# Patient Record
Sex: FEMALE | Race: WHITE | NOT HISPANIC OR LATINO | Employment: OTHER | URBAN - METROPOLITAN AREA
[De-identification: names, ages, dates, MRNs, and addresses within clinical notes are randomized per-mention and may not be internally consistent; named-entity substitution may affect disease eponyms.]

---

## 2018-07-20 ENCOUNTER — OFFICE VISIT (OUTPATIENT)
Dept: PODIATRY | Facility: CLINIC | Age: 78
End: 2018-07-20
Payer: MEDICARE

## 2018-07-20 DIAGNOSIS — M19.079 OSTEOARTHRITIS OF FIRST METATARSOPHALANGEAL JOINT: ICD-10-CM

## 2018-07-20 DIAGNOSIS — M20.42 HAMMER TOES OF BOTH FEET: ICD-10-CM

## 2018-07-20 DIAGNOSIS — G60.9 IDIOPATHIC PERIPHERAL NEUROPATHY: Primary | ICD-10-CM

## 2018-07-20 DIAGNOSIS — M20.11 VALGUS DEFORMITY OF BOTH GREAT TOES: ICD-10-CM

## 2018-07-20 DIAGNOSIS — M20.12 VALGUS DEFORMITY OF BOTH GREAT TOES: ICD-10-CM

## 2018-07-20 DIAGNOSIS — M20.41 HAMMER TOES OF BOTH FEET: ICD-10-CM

## 2018-07-20 PROCEDURE — 99999 PR PBB SHADOW E&M-NEW PATIENT-LVL II: CPT | Mod: PBBFAC,,, | Performed by: PODIATRIST

## 2018-07-20 PROCEDURE — 99214 OFFICE O/P EST MOD 30 MIN: CPT | Mod: S$PBB,,, | Performed by: PODIATRIST

## 2018-07-20 PROCEDURE — 99202 OFFICE O/P NEW SF 15 MIN: CPT | Mod: PBBFAC | Performed by: PODIATRIST

## 2018-07-20 RX ORDER — TAMSULOSIN HYDROCHLORIDE 0.4 MG/1
CAPSULE ORAL
COMMUNITY
End: 2021-12-28

## 2018-07-20 RX ORDER — DICLOFENAC SODIUM 10 MG/G
2 GEL TOPICAL 3 TIMES DAILY
Qty: 100 G | Refills: 1 | Status: SHIPPED | OUTPATIENT
Start: 2018-07-20 | End: 2021-12-28

## 2018-07-20 RX ORDER — DULOXETIN HYDROCHLORIDE 20 MG/1
20 CAPSULE, DELAYED RELEASE ORAL DAILY
Qty: 30 CAPSULE | Refills: 0 | Status: SHIPPED | OUTPATIENT
Start: 2018-07-20 | End: 2018-08-17 | Stop reason: SDUPTHER

## 2018-07-20 RX ORDER — ROSUVASTATIN CALCIUM 5 MG/1
TABLET, COATED ORAL
COMMUNITY
End: 2021-12-28 | Stop reason: SDUPTHER

## 2018-07-20 RX ORDER — TURMERIC 100 %
POWDER (GRAM) MISCELLANEOUS
COMMUNITY

## 2018-07-20 RX ORDER — METHOCARBAMOL 750 MG/1
TABLET, FILM COATED ORAL
COMMUNITY
End: 2021-12-28 | Stop reason: ALTCHOICE

## 2018-07-20 RX ORDER — ALPRAZOLAM 0.25 MG/1
TABLET ORAL
COMMUNITY
End: 2022-06-06

## 2018-07-20 RX ORDER — FERROUS SULFATE, DRIED 160(50) MG
TABLET, EXTENDED RELEASE ORAL
COMMUNITY

## 2018-07-20 RX ORDER — LEVOTHYROXINE SODIUM 75 UG/1
TABLET ORAL
COMMUNITY
End: 2021-12-28 | Stop reason: SDUPTHER

## 2018-07-20 RX ORDER — LANOLIN ALCOHOL/MO/W.PET/CERES
CREAM (GRAM) TOPICAL
COMMUNITY

## 2018-07-20 RX ORDER — GABAPENTIN 100 MG/1
100 CAPSULE ORAL 3 TIMES DAILY
Refills: 2 | COMMUNITY
Start: 2018-07-03 | End: 2019-12-04

## 2018-07-20 RX ORDER — FENOFIBRATE 145 MG/1
TABLET, FILM COATED ORAL
COMMUNITY
End: 2022-06-06

## 2018-07-25 PROBLEM — G60.9 IDIOPATHIC PERIPHERAL NEUROPATHY: Status: ACTIVE | Noted: 2018-07-25

## 2018-07-25 NOTE — PROGRESS NOTES
Subjective:      Patient ID: Gardenia Dent is a 78 y.o. female.    Chief Complaint: Follow-up and Nail Problem  Patient presents with complaint of more symptoms in her feet due to neuropath, not diabetic related.  Relates symptoms are always present, pain level fluctuates.  Sees Dr. Beckwith, but no treatment plan was really initiated.  States 1- 100 mg gabapentin we prescribed at night helps.  She has noticed symptoms in her hands as well.  Pain level 3/10 today.      ROS     Constitutional   Well-nourished, no distress, well oriented    Cardiovascular          No chest pain, no shortness of breath    Respiratory          No cough, no wheezing    Musculoskeletal        YES muscle aches, YES arthralgias/joint pain/ obvious evidence of arthritic and degenerative changes in feet and hands,                  no swelling in the extremities    Neurologic         NEUROPATHY         Objective:      Physical Exam  Vascular   Arterial Pulses Right: posterior tibialis 2/4, dorsalis pedis 2/4   Arterial Pulses Left: posterior tibialis 2/4, dorsalis pedis 2/4   No lower extremity edema bilateral   Mild varicosities consistent with age  Capillary refill test normal bilateral   Pedal skin temperature and color are normal bilateral     Integumentary   Skin is in good condition bilateral feet, thin, fragile, soft.   Narrow, bony foot type and structure with lack of fat pad  Prominent medial eminence HAV right>left foot, no pain, no erythema.   Hammertoe second digit right foot, stable  Onychomycosis of nails, no complications    Neurological   Neurological: gross sensation intact.   Experiences paresthesias consistent with neuropathy, no pain upon compression of web spaces were dorsal cutaneous nerves bilateral feet  Manual Muscle Test:  Excellent for patient's age with no pain bilateral     Musculoskeletal   Muscle Strength and Tone: normal, normal tone    Joints, Bones, and Muscles: limited ROM, hammertoe deformities (Semirigid  hammertoe second digit right, no pain), HAV with bunion/ moderate HAV right, prominent medial eminence, limited range of motion          Assessment:       Encounter Diagnoses   Name Primary?    Idiopathic peripheral neuropathy Yes    Osteoarthritis of first metatarsophalangeal joint     Valgus deformity of both great toes     Hammer toes of both feet          Plan:       Gardenia was seen today for follow-up and nail problem.    Diagnoses and all orders for this visit:    Idiopathic peripheral neuropathy    Osteoarthritis of first metatarsophalangeal joint    Valgus deformity of both great toes    Hammer toes of both feet    Other orders  -     DULoxetine (CYMBALTA) 20 MG capsule; Take 1 capsule (20 mg total) by mouth once daily.  -     diclofenac sodium 1 % Gel; Apply 2 g topically 3 (three) times daily.        After lengthy discussion with patient regarding ft type and structure, obvious evidence of arthritis in her feet and her hands and her neuropathy explained chronic underlying inflammation due to her arthritis is probably one of the main causes of her nerve pain.  Advised patient 1 -100 mg capsule of gabapentin at night is an extremely low dose.  We had a lengthy discussion regarding Cymbalta.  Advised patient would recommend she try this medication at all low dose for her overall symptoms and relates this information to Dr. Beckwith and her PCP.  We discussed medication, its effectiveness.   Patient related she would like to try Cymbalta, it is important to her to stay as active as possible.  Prescribed 1-20 mg Cymbalta daily.  Advised patient this can be increased as needed, discontinue gabapentin at night for about a week, may not be needed if Cymbalta is  effective, but if increased pain at night she can take it. Rxplained these medications are at a low dose and will not interfere with one another.  We did discuss potential side effects if she is very sensitive to these medications and obviously she is to  discontinue it and contact the office immediately with any side effects.  Patient verbalized understanding.  Recommended topical anti-inflammatory due to arthritic changes in feet especially 1st MPJ right.  Prescribed diclofenac gel and explained how to apply daily.  We reviewed appropriate shoes, especially for indoors  Counseled the patient on her conditions, their implications and medical management.  Instructed patient to contact the office with any changes, questions, concerns, worsening of symptoms. Patient verbalized understanding.   Total face to face time, exam, assessment, treatment, discussion, 25 minutes, more than half this time spent on consultation and coordination of care.   Follow up 1 months

## 2018-08-17 ENCOUNTER — OFFICE VISIT (OUTPATIENT)
Dept: PODIATRY | Facility: CLINIC | Age: 78
End: 2018-08-17
Payer: MEDICARE

## 2018-08-17 VITALS
HEIGHT: 65 IN | TEMPERATURE: 97 F | WEIGHT: 160 LBS | SYSTOLIC BLOOD PRESSURE: 117 MMHG | BODY MASS INDEX: 26.66 KG/M2 | DIASTOLIC BLOOD PRESSURE: 71 MMHG | HEART RATE: 82 BPM

## 2018-08-17 DIAGNOSIS — M20.11 VALGUS DEFORMITY OF BOTH GREAT TOES: ICD-10-CM

## 2018-08-17 DIAGNOSIS — M20.42 HAMMER TOES OF BOTH FEET: ICD-10-CM

## 2018-08-17 DIAGNOSIS — M20.41 HAMMER TOES OF BOTH FEET: ICD-10-CM

## 2018-08-17 DIAGNOSIS — G60.9 IDIOPATHIC PERIPHERAL NEUROPATHY: ICD-10-CM

## 2018-08-17 DIAGNOSIS — M19.079 OSTEOARTHRITIS OF FIRST METATARSOPHALANGEAL JOINT: ICD-10-CM

## 2018-08-17 DIAGNOSIS — M20.12 VALGUS DEFORMITY OF BOTH GREAT TOES: ICD-10-CM

## 2018-08-17 PROCEDURE — 99214 OFFICE O/P EST MOD 30 MIN: CPT | Mod: S$PBB,,, | Performed by: PODIATRIST

## 2018-08-17 PROCEDURE — 99213 OFFICE O/P EST LOW 20 MIN: CPT | Mod: PBBFAC | Performed by: PODIATRIST

## 2018-08-17 PROCEDURE — 99999 PR PBB SHADOW E&M-EST. PATIENT-LVL III: CPT | Mod: PBBFAC,,, | Performed by: PODIATRIST

## 2018-08-17 RX ORDER — DULOXETIN HYDROCHLORIDE 20 MG/1
20 CAPSULE, DELAYED RELEASE ORAL 2 TIMES DAILY
Qty: 180 CAPSULE | Refills: 0 | Status: SHIPPED | OUTPATIENT
Start: 2018-08-17 | End: 2018-11-22 | Stop reason: SDUPTHER

## 2018-08-24 NOTE — PROGRESS NOTES
Subjective:      Patient ID: Gardenia Dent is a 78 y.o. female.    Chief Complaint: Follow-up; Foot Pain; and Foot Problem  Patient presents for follow up neuropathic pain, not diabetic related.   Relates Cymbalta 20 mg daily has been helpful throughout the day, still increased pain at night with prolonged walking and standing, patient is very active.  She relates no side effects to the medication.  She continues to wear appropriate shoes to accommodate bunions/arthritis using toe spacers daily.      ROS     Constitutional   Well-nourished, no distress, well oriented    Cardiovascular          No chest pain, no shortness of breath    Respiratory         No cough, no congestion    Musculoskeletal        YES muscle aches, YES arthralgias/joint pain/hands    Neurologic         NEUROPATHY         Objective:      Physical Exam  Vascular   Arterial Pulses Right: posterior tibialis 2/4, dorsalis pedis 2/4   Arterial Pulses Left: posterior tibialis 2/4, dorsalis pedis 2/4   No lower extremity edema bilateral   Mild varicosities consistent with age  Capillary refill test normal bilateral   Pedal skin temperature and color are normal bilateral     Integumentary   Narrow, bony foot type and structure with lack of fat pad  Prominent medial eminence HAV right>left foot  Hammertoe second digit right foot, stable    Neurological   Neurological: gross sensation intact.   Experiences paresthesias consistent with neuropathy, no pain upon compression of web spaces were dorsal cutaneous nerves bilateral feet     Musculoskeletal   Muscle Strength and Tone: normal/  intact/ excellent for age, normal tone    Joints, Bones, and Muscles: limited ROM, hammertoe deformities/semirigid hammertoe second digit right  HAV with bunion/ moderate HAV right, prominent medial eminence, limited range of motion          Assessment:       Encounter Diagnoses   Name Primary?    Idiopathic peripheral neuropathy     Osteoarthritis of first  metatarsophalangeal joint     Valgus deformity of both great toes     Hammer toes of both feet          Plan:       Gardenia was seen today for follow-up, foot pain and foot problem.    Diagnoses and all orders for this visit:    Idiopathic peripheral neuropathy    Osteoarthritis of first metatarsophalangeal joint    Valgus deformity of both great toes    Hammer toes of both feet    Other orders  -     DULoxetine (CYMBALTA) 20 MG capsule; Take 1 capsule (20 mg total) by mouth 2 (two) times daily.      Discussed increasing Cymbalta to twice daily.  Reviewed medication at length, potential side effects.  Patient was in agreement with treatment plan.   Prescription increased to 120 mg tablet twice daily.  Advised patient do not take gabapentin throughout the day with this medication.  After a few weeks monitoring potential side effects and results of the Cymbalta she can take 100 mg gabapentin at night if needed for increased nerve pain in the evening.  We reviewed appropriate shoes, continue use of spacers for bunions.   advised patient continue Voltaren Gel daily as directed to feet.  Counseled the patient on her conditions, their implications and medical management.  Instructed patient to contact the office with any changes, questions, concerns, worsening of symptoms. Patient verbalized understanding.   Total face to face time, exam, assessment, treatment, discussion, documentation 25 minutes, more than half this time spent on consultation and coordination of care.   Follow up 3 months.      This note was created using [employer] voice recognition software that occasionally misinterpreted phrases or words.

## 2018-11-21 ENCOUNTER — TELEPHONE (OUTPATIENT)
Dept: PODIATRY | Facility: CLINIC | Age: 78
End: 2018-11-21

## 2018-11-21 RX ORDER — DULOXETIN HYDROCHLORIDE 20 MG/1
20 CAPSULE, DELAYED RELEASE ORAL 2 TIMES DAILY
Qty: 180 CAPSULE | Refills: 0 | Status: CANCELLED | OUTPATIENT
Start: 2018-11-21 | End: 2019-02-19

## 2018-11-21 NOTE — TELEPHONE ENCOUNTER
Pt requesting refill of Duloxetine 20mg to be sent to [facility].

## 2018-11-22 RX ORDER — DULOXETIN HYDROCHLORIDE 20 MG/1
20 CAPSULE, DELAYED RELEASE ORAL 2 TIMES DAILY
Qty: 180 CAPSULE | Refills: 0 | Status: SHIPPED | OUTPATIENT
Start: 2018-11-22 | End: 2019-02-20

## 2019-10-02 ENCOUNTER — CLINICAL SUPPORT (OUTPATIENT)
Dept: REHABILITATION | Facility: HOSPITAL | Age: 79
End: 2019-10-02
Payer: MEDICARE

## 2019-10-02 DIAGNOSIS — M54.31 RIGHT SIDED SCIATICA: Primary | ICD-10-CM

## 2019-10-02 DIAGNOSIS — M54.50 LOW BACK PAIN: ICD-10-CM

## 2019-10-02 DIAGNOSIS — M54.41 ACUTE RIGHT-SIDED LOW BACK PAIN WITH RIGHT-SIDED SCIATICA: Primary | ICD-10-CM

## 2019-10-02 PROBLEM — M54.40 ACUTE RIGHT-SIDED LOW BACK PAIN WITH SCIATICA: Status: ACTIVE | Noted: 2019-10-02

## 2019-10-02 PROCEDURE — G8978 MOBILITY CURRENT STATUS: HCPCS | Mod: CL,PN

## 2019-10-02 PROCEDURE — 97161 PT EVAL LOW COMPLEX 20 MIN: CPT | Mod: PN

## 2019-10-02 PROCEDURE — G8979 MOBILITY GOAL STATUS: HCPCS | Mod: CJ,PN

## 2019-10-02 NOTE — PLAN OF CARE
[facility]  Physical Therapy Initial Evaluation    Name: Gardenia Dent  Clinic Number: [id number]    Therapy Diagnosis:   Encounter Diagnosis   Name Primary?    Acute right-sided low back pain with right-sided sciatica Yes     Physician: Oxana Greene MD    Physician Orders: PT Eval and Treat   Medical Diagnosis: lumbar Radiculopathy   Evaluation Date: 10/2/2019  Authorization period Expiration: 12/31/2019  Plan of Care Certification Period: 12/31/2019     Visit #: 1/ Visits authorized: 12  Time In:2:00 PM   Time Out: 3:15 PM   Total Billable Time: 60  minutes    Precautions: Standard    Subjective   Date of onset: Saturday 9/28/2019   Date of Surgery: N/A     No past medical history on file.  Gardenia Dent  has a past surgical history that includes Hysterectomy; Total knee arthroplasty; Wrist surgery; Hand surgery; Hip surgery; and Sinus surgery.    Gardenia has a current medication list which includes the following prescription(s): alprazolam, ascorbic acid, bromelains, calcium-vitamin d3, diclofenac sodium, duloxetine, fenofibrate, gabapentin, murtaza (zingiber officinalis), levothyroxine, loratadine-pseudoephedrine 5-120 mg, methocarbamol, multivit with minerals/lutein, rosuvastatin, tamsulosin, and turmeric (bulk).    Review of patient's allergies indicates:   Allergen Reactions    Sulfa (sulfonamide antibiotics)         Imaging, : Xray - unremarkable     Prior Therapy: No therapy for current condition  Social History: Patient lives in a single level home with 2 steps to enter from front; 1 deeper step to enter through garage   Occupation: retired   Prior Level of Function: Active, Independent   Current Level of Function: Unable to get up and move around secondary to RLE pain; Using a walker at home to get around;       Pain: current 8/10, worst 10/10, best 4/10,   Aching, Throbbing and Grabbing, constant  Radicular symptoms: Right LE - buttock into right lateral and posterior  "thigh   Aggravating Factors: Sitting, Standing, Night Time, Lifting and Getting out of bed/chair  Easing Factors: heating pad and lying down with           History of current condition - Gardenia reports: 5 day history of symptoms, including excruciating back and RLE pain when she got up on Saturday; Gardenia reports unaware of anything that she did to precipitate her back or leg pain; Gardenia stated that the pain got so bad she had her  take her to the ER on Sunday.  She has been taking meds given and also went to see her PCP Yesterday.  Gardenia stated that she could hardly walk at all a few days ago, but today the pain has eased up a little. Gardenia stated that she has tried to do some stretching exercises at home, using a heating pad- which has helped some.     Pts goals: To relieve my pain and return to my usual state of well- being       Objective     Observation: Gardenia came into clinic via wheelchair; she stated that she was lying down in the back seat of the car and felt a little lightheaded when she got up, so was afraid to walk into clinic;     Posture: equal weight on BLE's; no lateral shift in trunk noted;     Gait: slight antalgic gait pattern noted prior to tx:  After manual stretching/mobilization of lumbar spine and some flex/extension exercises; Gardenia was able to ambulate for 2'33" around in clinic before RLE pain started to return.  This was a significant improvement from prior to tx.     Lumbar Range of Motion:    Degrees Pain   Flexion 25   No pain         Extension 5   alittle discomfort         Left Side Bending Hand to knee Pain right side along quadratus         Right Side Bending Hand to mid-thigh No pain         Left rotation   20 No pain         Right Rotation   20 No pain            Lower Extremity Strength  Right LE  Left LE    Knee extension: 5/5 Knee extension: 5/5   Knee flexion: 5/5 Knee flexion: 5/5   Hip flexion: 5/5 Hip flexion: 5/5   Hip extension:  5/5 Hip extension: 5/5   Hip " abduction: 5/5 Hip abduction: 5/5   Hip adduction: 5/5 Hip adduction 5/5   Ankle dorsiflexion: 5/5 Ankle dorsiflexion: 5/5   Ankle plantarflexion: 5/5 Ankle plantarflexion: 5/5   Upper abdominals 4-/5     Lower abdominals 3+/5     Back extensors 3+/5       Special Tests:    Repeated Flexion Negative   Repeated Extension Negative   Prone Instability Negative   Straight Leg Raise Negative   Slump Negative   Quadrant Negative   Femoral nerve test Negative       DTR:   Right Left Comment   Patellar (L3-4) 2+ 2+    Achilles (S1) 2+ 2+        Joint Mobility:   Lumbar: CPA unrestricted,   RPA unrestricted  LPA unrestricted    Thoracic: unrestricted    Palpation: moderate tenderness to palpation at right quadratus, right paraspinals     Sensation: intact to light touch BLS's; does have neuropathy in fingers secondary to carpal tunnel     Flexibility:     90/90 SLR = R minimal restriction, L minimal restriction   Ely's test: R no restriction, L no restriction   Judy's test: R no restriction, L no restriction   Joe test: R no restriction, L no restriction      Functional Limitations Reports - G Codes  Category: Mobility, Body position, Carrying, Self care, Other  Tool: Oswestry   Score: 74% limitation   Current:CL = least 60% but < 80% impaired, limited or restricted  Goal:CJ = at least 20% but < 40% impaired, limited or restricted    PT Evaluation Completed: Yes  Discussed Plan of Care with patient: Yes    TREATMENT:  Gardenia received therapeutic exercises to develop ROM for 15 minutes including:  Instructed Gardenia in exercises below:  SKC > DKC  Piriformis stretch  Bridging  Prone lying > Prone extension   Cat/Camel     Home Exercises and Patient Education Provided  Education provided re:   - progress towards goals   - role of therapy in multi - disciplinary team, goals for therapy  Pt educated on condition, POC, and expectations in therapy.  No spiritual or educational barriers to learning provided    Home exercises:  Pt  will be provided HEP during course of treatment with progressions as appropriate. Pt was advised to perform these exercises free of pain, and to stop performing them if pain occurs.   Gardenia demonstrated good  understanding of the education provided.     Assessment     Gardenia is a 79 y.o. female referred to outpatient physical therapy and presents to PT with lumbar radiculopathy . Patient demonstrates limitations as described in the problem list. Pt will benefit from physcial therapy services in order to maximize pain free and/or functional use of right LE and lower back . The following goals were discussed with the patient and patient is in agreement with them as to be addressed in the treatment plan.     Anticipated barriers to physical therapy: none    Medical necessity is demonstrated by the following IMPAIRMENTS/PROBLEM LIST:    impaired functional mobility, gait instability, decreased lower extremity function, pain and decreased ROM        GOALS:    Long Term Goals: 6 weeks    Pain: Decrease pain to no more than 2 /10 to allow for return to full participation in daily and recreational activities   Strength: Improve strength in core muscles to 4/5 for improved lumbopelvic stability  ROM: Improve ROM to 75% of normal limits   Functional scale: Improve score on Oswestry to <30% limitation   Lifting: Lift 10 lbs to waist level, 5 lbs to shoulder level, 5 lbs to overhead without pain or compensation  Walking: Increase walking distance and/or duration to 30 mins without pain   Postures: Increase sitting and/or standing duration to 30 mins without pain   Transfers: Perform sit <> stand and supine <> sit  transfers without increased pain or limitation  Exercise: demonstrate independence with home exercise program to maintain gains made in therapy.        Plan     Certification Period: 10/2/2019 to 12/31/2019.    Outpatient physical therapy 2 times weekly to include: Manual Therapy, Moist Heat/ Ice, Neuromuscular  Re-ed, Patient Education and Therapeutic Exercise. Cont PT for 6 weeks.   Pt may be seen by PTA as part of the rehabilitation team.     I certify the need for these services furnished under this plan of treatment and while under my care.    Ramona Ron, PT          Attestation:   I have seen the patient, reviewed the therapist's plan of care, and I agree with the plan of care.   I certify the need for these services furnished under this plan of treatment and while under my care.         _______________            ________                                               _____________________  Physician/Referring Practitioner                                                            Date of Signature

## 2019-10-04 ENCOUNTER — CLINICAL SUPPORT (OUTPATIENT)
Dept: REHABILITATION | Facility: HOSPITAL | Age: 79
End: 2019-10-04
Payer: MEDICARE

## 2019-10-04 DIAGNOSIS — M54.41 ACUTE RIGHT-SIDED LOW BACK PAIN WITH RIGHT-SIDED SCIATICA: Primary | ICD-10-CM

## 2019-10-04 PROCEDURE — 97112 NEUROMUSCULAR REEDUCATION: CPT | Mod: PN

## 2019-10-04 PROCEDURE — 97140 MANUAL THERAPY 1/> REGIONS: CPT | Mod: PN

## 2019-10-04 NOTE — PROGRESS NOTES
Physical Therapy Daily Note     Name: Gardenia Dent  [facility] Number: [id number]  Diagnosis:   Encounter Diagnosis   Name Primary?    Acute right-sided low back pain with right-sided sciatica Yes     Physician: Oxana Greene MD  Precautions: standard  Visit #: 2 of 12   PTA Visit #: 0  Time In: 8:00 AM   Time Out: 9:30 AM     Subjective     Pt reports: Gardenia came into clinic in the wheelchair today; stated that she has been in horrible pain since shortly after she left the other day; She is unable to sleep; got Dr. Greene to change her meds - no on Flexeril instead of gabapenton. She reports that she is unable to walk - cannot put weight on her RLE.   Pain Scale: Gardenia rates pain on a scale of 0-10 to be 10 currently.    Objective     Gardenia received individual therapeutic exercises to develop ROM, posture, core stabilization and centralization of pain from RLE to lower back  for 43  minutes including:  Time spent in 90/90 while getting heat to LB - did not reduce as much pain in her RLE as it did the other day - moved proximally into upper thigh; long axis distraction of RLE in supine followed by posterior hip stretch; pirformis stretch on right using towel for assist - hold x 6 sec and repeat x 3.   Had patient turn onto stomach - 3 pillows under abdomen; prone lying x 10 mins to try and decompress disc - moved legs away from side of pain (to left) to decreased radicular pain - patient having a very hard time tolerating any of this, just kept focusing on her pain and inability to get any relief; was able to get her into MIA without increased RLE pain and had her maintain x 5 mins. .    Had her get up and try to walk using walker; she had immediate pain upon sitting in right hip; and unable to stand on her RLE; able to ambulate a very short distance - 50ft to large mat; performed manual tx to lower back - lumbar rolls into painful side until pain started to  decrease; knee to chest using swiss ball; over into prone with pillows to try some extension again; after prone lying x 10 mins able to get into MIA x 10 reps.     Gardenia received the following manual therapy techniques: Joint mobilizations and Soft tissue Mobilization were applied to the: lumbar spine  for 15 minutes including:  SL flexion of left side of lumbar spine; side-bending on right in prone to wedge disc; - all in combination with above activities to reduce Gardenia's radicular pain      The patient received the following direct contact modalities after being cleared for contraindications:     The patient received the following supervised modalities after being cleared for contradictions: moist heat to lumbar in 90/90 position x 15 mins     Written Home Exercises Provided:   Prone lying - 2-3 pillows - gradual extension to MIA - slow, allow disc to migrate anteriorly; goal is to centralize pain into right hip/lower back.   Pt demo fair understanding of the education provided. Gardenia demonstrated fair return demonstration of activities.   nd   Education provided re:  Gardenia verbalized fair understanding of education provided.   No spiritual or educational barriers to learning provided    Assessment     Patient finally calmed down enough to tolerate some early mobilization; she wants immediate relief of her pain and explained to her that this was not usually how it worked. Needs to be vigilant with prone exercises and needs to move around as much as she is able to tolerate during the day - movement is important.   This is a 79 y.o. female referred to outpatient physical therapy and presents with a medical diagnosis of lumbar radiculopathy  and demonstrates limitations as described in the problem list. Pt prognosis is Good. Pt will continue to benefit from skilled outpatient physical therapy to address the deficits listed in the problem list, provide pt/family education and to maximize pt's level of  independence in the home and community environment.     Goals as follows:Long Term Goals: 6 weeks     Pain: Decrease pain to no more than 2 /10 to allow for return to full participation in daily and recreational activities   Strength: Improve strength in core muscles to 4/5 for improved lumbopelvic stability  ROM: Improve ROM to 75% of normal limits   Functional scale: Improve score on Oswestry to <30% limitation   Lifting: Lift 10 lbs to waist level, 5 lbs to shoulder level, 5 lbs to overhead without pain or compensation  Walking: Increase walking distance and/or duration to 30 mins without pain   Postures: Increase sitting and/or standing duration to 30 mins without pain   Transfers: Perform sit <> stand and supine <> sit  transfers without increased pain or limitation  Exercise: demonstrate independence with home exercise program to maintain gains made in therapy.                     Plan     Continue with established Plan of Care towards PT goals.    Therapist: Ramona Ron, PT  10/4/2019

## 2019-10-07 ENCOUNTER — CLINICAL SUPPORT (OUTPATIENT)
Dept: REHABILITATION | Facility: HOSPITAL | Age: 79
End: 2019-10-07
Payer: MEDICARE

## 2019-10-07 DIAGNOSIS — M54.41 ACUTE RIGHT-SIDED LOW BACK PAIN WITH RIGHT-SIDED SCIATICA: Primary | ICD-10-CM

## 2019-10-07 PROCEDURE — 97140 MANUAL THERAPY 1/> REGIONS: CPT | Mod: PN

## 2019-10-07 PROCEDURE — 97110 THERAPEUTIC EXERCISES: CPT | Mod: PN

## 2019-10-07 PROCEDURE — 97014 ELECTRIC STIMULATION THERAPY: CPT | Mod: PN

## 2019-10-07 NOTE — PROGRESS NOTES
Physical Therapy Daily Note     Name: Gardenia Dent  Clinic Number: [id number]  Diagnosis:   Encounter Diagnosis   Name Primary?    Acute right-sided low back pain with right-sided sciatica Yes     Physician: Oxana Greene MD  Precautions: standard  Visit #: 3 of 12   PTA Visit #: 0  Time In: 8:00 AM   Time Out: 9:30 AM     Subjective     Pt reports: Gardenia able to ambulate into clinic with her walker today; she stated that she had to return to the ER yesterday, the pain was just more than she could manage. She said that she is to be scheduled for an MRI; she also received a pain injection which she stated has been very helpful. She also said that the nurse told her she needs to do a lot of stretching to get the nerve un-pinched.  Sitting on chair with weight shifted to left IT  Pain Scale: Gardenia rates pain on a scale of 0-10 to be 8 currently.    Objective     Gardenia received individual therapeutic exercises to develop ROM, posture, core stabilization and centralization of pain from RLE to lower back  for 43  minutes including:    SKC > DKC:  5 reps each leg, 5 reps double  Piriformis Stretch Right leg 20 sec hold x 6  Lumbar rolls x 3 mins   Prone - knee flexion x 10  Prone leg extension x 10  MIA and move into extension          Gardenia received the following manual therapy techniques: Joint mobilizations and Soft tissue Mobilization were applied to the: lumbar spine  for 15 minutes including:  SL flexion of left side of lumbar spine; side-bending on right in prone to wedge disc; - RLE - hamstring stretching; In side lying - right piriformis STM, RLE into hip extension - lumbar extension. Prone - lumbar side bending on right side. Paraspinal skin rolling.      The patient received the following direct contact modalities after being cleared for contraindications:     The patient received the following supervised modalities after being cleared for  contradictions: moist heat to lumbar in 90/90 position x 15 mins with High Volt Electrical Stim to paraspinals on lower back.     Written Home Exercises Provided:   Prone lying - 2-3 pillows - gradual extension to MIA - slow, allow disc to migrate anteriorly; goal is to centralize pain into right hip/lower back.   Pt demo fair understanding of the education provided. Gardenia demonstrated fair return demonstration of activities.   nd   Education provided re:  Gardenia verbalized fair understanding of education provided.   No spiritual or educational barriers to learning provided    Assessment     Gardenia tolerated treatment much better today; Pain is more in her proximal thigh/hip today as opposed to further down her leg; She was able to sit with equal WB on both IT's after treatment; She is not walking with any type of lateral shift. . Needs to be vigilant with prone exercises and needs to move around as much as she is able to tolerate during the day - movement is important.   This is a 79 y.o. female referred to outpatient physical therapy and presents with a medical diagnosis of lumbar radiculopathy  and demonstrates limitations as described in the problem list. Pt prognosis is Good. Pt will continue to benefit from skilled outpatient physical therapy to address the deficits listed in the problem list, provide pt/family education and to maximize pt's level of independence in the home and community environment.     Goals as follows:Long Term Goals: 6 weeks     Pain: Decrease pain to no more than 2 /10 to allow for return to full participation in daily and recreational activities   Strength: Improve strength in core muscles to 4/5 for improved lumbopelvic stability  ROM: Improve ROM to 75% of normal limits   Functional scale: Improve score on Oswestry to <30% limitation   Lifting: Lift 10 lbs to waist level, 5 lbs to shoulder level, 5 lbs to overhead without pain or compensation  Walking: Increase walking distance  and/or duration to 30 mins without pain   Postures: Increase sitting and/or standing duration to 30 mins without pain   Transfers: Perform sit <> stand and supine <> sit  transfers without increased pain or limitation  Exercise: demonstrate independence with home exercise program to maintain gains made in therapy.                     Plan     Continue with established Plan of Care towards PT goals.    Therapist: Ramona Ron, PT  10/7/2019

## 2019-10-09 ENCOUNTER — CLINICAL SUPPORT (OUTPATIENT)
Dept: REHABILITATION | Facility: HOSPITAL | Age: 79
End: 2019-10-09
Payer: MEDICARE

## 2019-10-09 DIAGNOSIS — M54.41 ACUTE RIGHT-SIDED LOW BACK PAIN WITH RIGHT-SIDED SCIATICA: Primary | ICD-10-CM

## 2019-10-09 PROCEDURE — 97014 ELECTRIC STIMULATION THERAPY: CPT | Mod: PN

## 2019-10-09 PROCEDURE — 97140 MANUAL THERAPY 1/> REGIONS: CPT | Mod: PN

## 2019-10-09 PROCEDURE — 97110 THERAPEUTIC EXERCISES: CPT | Mod: PN

## 2019-10-09 NOTE — PROGRESS NOTES
"                                                    Physical Therapy Daily Note     Name: Gardenia Dent  Clinic Number: [id number]  Diagnosis:   No diagnosis found.  Physician: Oxana Greene MD  Precautions: standard  Visit #: 4 of 12   PTA Visit #: 0  Time In:  2:50 PM   Time Out: 4:07 PM     Subjective     Pt reports: Gardenia able to ambulate into clinic with her Rollator today; she stated that she uses it when she is walking outside - just doesn't trust her LE's at this point.  Stated that she has been able to ambulate in the house without use of an AD Pain not as intense, but she stated she was "really sore" in her lower back, right posterior buttock/hip area and into her right lateral proximal thigh   Pain Scale: Gardenia rates pain on a scale of 0-10 to be 10 currently.    Objective     Gardenia received individual therapeutic exercises to develop ROM, posture, core stabilization and centralization of pain from RLE to lower back  for 43  minutes including:    SKC > DKC:  5 reps each leg, 5 reps double  Piriformis Stretch Right leg 20 sec hold x 6  Lumbar rolls x 3 mins   Prone - knee flexion x 10  Prone leg extension x 10  MIA and move into extension  - had to move legs off to left today to perfrom prone extensions secondary to increased leg pain on Right; after 10 reps, able to come back to center and perform 5 additional without sx's.       Gardenia received the following manual therapy techniques: Joint mobilizations and Soft tissue Mobilization were applied to the: lumbar spine  for 15 minutes including:  SL flexion of left side of lumbar spine; side-bending on right in prone to wedge disc; - RLE - hamstring stretching; In side lying - right piriformis STM, RLE into hip extension - lumbar extension. Prone - lumbar side bending on right side. Paraspinal skin rolling.      The patient received the following direct contact modalities after being cleared for contraindications:     The patient received the " following supervised modalities after being cleared for contradictions: moist heat to lumbar in 90/90 position x 15 mins with High Volt Electrical Stim to paraspinals on lower back.     Written Home Exercises Provided:   Prone lying - 2-3 pillows - gradual extension to MIA - slow, allow disc to migrate anteriorly; goal is to centralize pain into right hip/lower back.   Pt demo fair understanding of the education provided. Gardenia demonstrated fair return demonstration of activities.   nd   Education provided re:  Gardenia verbalized fair understanding of education provided.   No spiritual or educational barriers to learning provided    Assessment     Gardenia tolerated treatment much better today; Gardenia is moving with greater ease, can get up from supine to sit to stand independently; Walking is much more fluid and she can stand and hold a conversation without grasping onto her walker. Pain is more in her proximal thigh/hip today as opposed to further down her leg; She was able to sit with equal WB on both IT's after treatment; She is not walking with any type of lateral shift. . Needs to be vigilant with prone exercises and needs to move around as much as she is able to tolerate during the day - movement is important.   This is a 79 y.o. female referred to outpatient physical therapy and presents with a medical diagnosis of lumbar radiculopathy  and demonstrates limitations as described in the problem list. Pt prognosis is Good. Pt will continue to benefit from skilled outpatient physical therapy to address the deficits listed in the problem list, provide pt/family education and to maximize pt's level of independence in the home and community environment.     Goals as follows:Long Term Goals: 6 weeks     Pain: Decrease pain to no more than 2 /10 to allow for return to full participation in daily and recreational activities   Strength: Improve strength in core muscles to 4/5 for improved lumbopelvic stability  ROM:  Improve ROM to 75% of normal limits   Functional scale: Improve score on Oswestry to <30% limitation   Lifting: Lift 10 lbs to waist level, 5 lbs to shoulder level, 5 lbs to overhead without pain or compensation  Walking: Increase walking distance and/or duration to 30 mins without pain   Postures: Increase sitting and/or standing duration to 30 mins without pain   Transfers: Perform sit <> stand and supine <> sit  transfers without increased pain or limitation  Exercise: demonstrate independence with home exercise program to maintain gains made in therapy.                     Plan     Continue with established Plan of Care towards PT goals.    Therapist: Ramona Ron, PT  10/9/2019

## 2019-10-11 ENCOUNTER — CLINICAL SUPPORT (OUTPATIENT)
Dept: REHABILITATION | Facility: HOSPITAL | Age: 79
End: 2019-10-11
Payer: MEDICARE

## 2019-10-11 DIAGNOSIS — M54.41 ACUTE RIGHT-SIDED LOW BACK PAIN WITH RIGHT-SIDED SCIATICA: Primary | ICD-10-CM

## 2019-10-11 PROCEDURE — 97140 MANUAL THERAPY 1/> REGIONS: CPT | Mod: PN

## 2019-10-11 PROCEDURE — 97110 THERAPEUTIC EXERCISES: CPT | Mod: PN

## 2019-10-11 NOTE — PROGRESS NOTES
PT met face to face with Jonathan Favre, PTA to discuss patient's treatment plan and progress towards established goals.  Treatment will be continued as described in initial report/eval and progress notes.  Patient will be seen by physical therapist at least every sixth visit and minimally once per month.    Additional information: ok to add following exercises as Gardenia is able to perform:     Pelvic tilts  Cat/camel   bridges   SL Hip abduction   Clams / Reverse Clams  Seated - LAQ's  Either Recumbent bike or Nu-step         Ramona Rno, PT  10/11/2019

## 2019-10-11 NOTE — PROGRESS NOTES
"                                                    Physical Therapy Daily Note     Name: Gardenia Dent  Clinic Number: [id number]  Diagnosis:   Encounter Diagnosis   Name Primary?    Acute right-sided low back pain with right-sided sciatica Yes     Physician: Oxana Greene MD  Precautions: standard  Visit #: 5 of 12   PTA Visit #: 0    Time In:  9:00 AM   Time Out: 10:10 AM      Subjective     Pt reports: "I just don't think I'm getting any better, and  Sinan is just not getting better either"  "I think there might be something else going on"   Gardenia seems to think that their bird getting sick at home a couple of weeks ago may have something to do with her onset of back pain - told her that I doubted if this was a possibility.  Gardenia ambulated into clinic with use of RW, minimal UE support required.   Pain Scale: Gardenia rates pain on a scale of 0-10 to be 10 currently.    Objective     Gardenia received individual therapeutic exercises to develop ROM, posture, core stabilization and centralization of pain from RLE to lower back  for 43  minutes including:    SKC > DKC:  5 reps each leg, 5 reps double  Piriformis Stretch Right leg 20 sec hold x 6  Lumbar rolls x 3 mins   Bridges x 10  Prone - knee flexion x 10  Prone leg extension x 10  MIA and move into extension  - had to move legs off to left today to perfrom prone extensions secondary to increased leg pain on Right; after 10 reps, able to come back to center and perform 5 additional without sx's.       Gardenia received the following manual therapy techniques: Joint mobilizations and Soft tissue Mobilization were applied to the: lumbar spine  for 15 minutes including:  SL flexion of left side of lumbar spine; side-bending on right in prone to wedge disc; - RLE - hamstring stretching; In side lying - right piriformis STM, RLE into hip extension - lumbar extension. Prone - lumbar side bending on right side. Paraspinal skin rolling.      The patient received " the following direct contact modalities after being cleared for contraindications:     The patient received the following supervised modalities after being cleared for contradictions: moist heat to lumbar in 90/90 position x 15 mins with High Volt Electrical Stim to paraspinals on lower back.     Written Home Exercises Provided:   Prone lying - 2-3 pillows - gradual extension to MIA - slow, allow disc to migrate anteriorly; goal is to centralize pain into right hip/lower back.   Pt demo fair understanding of the education provided. Gardenia demonstrated fair return demonstration of activities.   nd   Education provided re:  Gardenia verbalized fair understanding of education provided.   No spiritual or educational barriers to learning provided    Assessment     Gardenia tolerated treatment much better today; Gardenia is moving with greater ease, can get up from supine to sit to stand independently; Walking is much more fluid and she can stand and hold a conversation without grasping onto her walker. Pain is more in her proximal thigh/hip today as opposed to further down her leg; She was able to sit with equal WB on both IT's after treatment; She is not walking with any type of lateral shift. . Needs to be vigilant with prone exercises and needs to move around as much as she is able to tolerate during the day - movement is important.   This is a 79 y.o. female referred to outpatient physical therapy and presents with a medical diagnosis of lumbar radiculopathy  and demonstrates limitations as described in the problem list. Pt prognosis is Good. Pt will continue to benefit from skilled outpatient physical therapy to address the deficits listed in the problem list, provide pt/family education and to maximize pt's level of independence in the home and community environment.     Goals as follows:Long Term Goals: 6 weeks     Pain: Decrease pain to no more than 2 /10 to allow for return to full participation in daily and  recreational activities   Strength: Improve strength in core muscles to 4/5 for improved lumbopelvic stability  ROM: Improve ROM to 75% of normal limits   Functional scale: Improve score on Oswestry to <30% limitation   Lifting: Lift 10 lbs to waist level, 5 lbs to shoulder level, 5 lbs to overhead without pain or compensation  Walking: Increase walking distance and/or duration to 30 mins without pain   Postures: Increase sitting and/or standing duration to 30 mins without pain   Transfers: Perform sit <> stand and supine <> sit  transfers without increased pain or limitation  Exercise: demonstrate independence with home exercise program to maintain gains made in therapy.                     Plan     Continue with established Plan of Care towards PT goals.    Therapist: Ramona Ron, PT  10/11/2019

## 2019-10-16 ENCOUNTER — CLINICAL SUPPORT (OUTPATIENT)
Dept: REHABILITATION | Facility: HOSPITAL | Age: 79
End: 2019-10-16
Payer: MEDICARE

## 2019-10-16 DIAGNOSIS — M54.41 ACUTE RIGHT-SIDED LOW BACK PAIN WITH RIGHT-SIDED SCIATICA: Primary | ICD-10-CM

## 2019-10-16 PROCEDURE — 97010 HOT OR COLD PACKS THERAPY: CPT | Mod: PN

## 2019-10-16 PROCEDURE — 97110 THERAPEUTIC EXERCISES: CPT | Mod: PN

## 2019-10-16 PROCEDURE — 97014 ELECTRIC STIMULATION THERAPY: CPT | Mod: PN

## 2019-10-16 NOTE — PROGRESS NOTES
"                                                    Physical Therapy Daily Note     Name: Gardenia Dent  Clinic Number: [id number]  Diagnosis:   Encounter Diagnosis   Name Primary?    Acute right-sided low back pain with right-sided sciatica Yes     Physician: Oxana Greene MD  Precautions: standard  Visit #: 6 of 12   PTA Visit #: 1    Time In:  1:00 PM   Time Out:  2:05 PM      Subjective     Pt reports: "I just don't seem to be much better. I might have to switch to a chiropractor."  Pain Scale: Gardenia rates pain on a scale of 0-10 to be 6-7 currently.    Objective     Gardenia received individual therapeutic exercises to develop ROM, posture, core stabilization and centralization of pain from RLE to lower back  for 43  minutes including:    Nustep level 3 x 5 mins  SKC > DKC:  5 reps each leg, 5 reps double with strap  Piriformis Stretch Right leg 20 sec hold x 6  Lumbar rolls x 3 mins with towel between knees  Bridges x 10  Prone - knee flexion x 10  Prone leg extension x 10  Prone Lumbar Ext x 10  MIA x 3mins     DNP  Gardenia received the following manual therapy techniques: Joint mobilizations and Soft tissue Mobilization were applied to the: lumbar spine  for 15 minutes including:  SL flexion of left side of lumbar spine; side-bending on right in prone to wedge disc; - RLE - hamstring stretching; In side lying - right piriformis STM, RLE into hip extension - lumbar extension. Prone - lumbar side bending on right side. Paraspinal skin rolling.      The patient received the following direct contact modalities after being cleared for contraindications:     The patient received the following supervised modalities after being cleared for contradictions: moist heat to lumbar in 90/90 position x 15 mins with IF Electrical Stim to paraspinals on lower back.     Written Home Exercises Provided:   Prone lying - 2-3 pillows - gradual extension to MIA - slow, allow disc to migrate anteriorly; goal is to centralize " pain into right hip/lower back.   Pt demo fair understanding of the education provided. Gardenia demonstrated fair return demonstration of activities.   nd   Education provided re:  Gardenia verbalized fair understanding of education provided.   No spiritual or educational barriers to learning provided    Assessment     Gardenia tolerated treatment much better today; Gardenia is moving with greater ease, can get up from supine to sit to stand independently; Walking is much more fluid and she can stand and hold a conversation without grasping onto her walker. Pain is more in her proximal thigh/hip today as opposed to further down her leg; She was able to sit with equal WB on both IT's after treatment; She is not walking with any type of lateral shift. . Needs to be vigilant with prone exercises and needs to move around as much as she is able to tolerate during the day - movement is important.   This is a 79 y.o. female referred to outpatient physical therapy and presents with a medical diagnosis of lumbar radiculopathy  and demonstrates limitations as described in the problem list. Pt prognosis is Good. Pt will continue to benefit from skilled outpatient physical therapy to address the deficits listed in the problem list, provide pt/family education and to maximize pt's level of independence in the home and community environment.     Goals as follows:Long Term Goals: 6 weeks     Pain: Decrease pain to no more than 2 /10 to allow for return to full participation in daily and recreational activities   Strength: Improve strength in core muscles to 4/5 for improved lumbopelvic stability  ROM: Improve ROM to 75% of normal limits   Functional scale: Improve score on Oswestry to <30% limitation   Lifting: Lift 10 lbs to waist level, 5 lbs to shoulder level, 5 lbs to overhead without pain or compensation  Walking: Increase walking distance and/or duration to 30 mins without pain   Postures: Increase sitting and/or standing duration  to 30 mins without pain   Transfers: Perform sit <> stand and supine <> sit  transfers without increased pain or limitation  Exercise: demonstrate independence with home exercise program to maintain gains made in therapy.                     Plan     Continue with established Plan of Care towards PT goals.    Therapist: Jonathan Favre, PTA  10/16/2019

## 2019-11-01 ENCOUNTER — CLINICAL SUPPORT (OUTPATIENT)
Dept: REHABILITATION | Facility: HOSPITAL | Age: 79
End: 2019-11-01
Payer: MEDICARE

## 2019-11-01 DIAGNOSIS — M54.41 ACUTE RIGHT-SIDED LOW BACK PAIN WITH RIGHT-SIDED SCIATICA: Primary | ICD-10-CM

## 2019-11-01 DIAGNOSIS — M54.9 BACK PAIN: Primary | ICD-10-CM

## 2019-11-01 PROCEDURE — 97140 MANUAL THERAPY 1/> REGIONS: CPT | Mod: PN

## 2019-11-01 PROCEDURE — 97110 THERAPEUTIC EXERCISES: CPT | Mod: PN

## 2019-11-01 NOTE — PROGRESS NOTES
Physical Therapy Daily Note     Name: Gardenia ROWAN [facility] Number: [id number]  Diagnosis:   Encounter Diagnosis   Name Primary?    Acute right-sided low back pain with right-sided sciatica Yes     Physician:  Dr. Garcia   Precautions: standard  Visit #: 1 of 12   PTA Visit #: 1    Time In:  8:00 AM   Time Out:   9:30 AM        Subjective     Pt reports:  Gardenia went to see Neurosurgery - Dr. Garcia; also had another MRI with Contrast to R/O possible tumor due to constant RLE pain; Since her last visit here,  She has improved tremendously, no longer having constant right LE pain; now using cane for ambulation, but stated she uses her walker in the house at home; She has not been doing much exercise, but has been walking in the house a lot. Gardenia stated that she has been using alternate heat/ice to her back and this has given her the most relief - allowing her to sleep at night.   Pain Scale: Gardenia rates pain on a scale of 0-10 to be 3-4 currently.    Objective     Gardenia received individual therapeutic exercises to develop ROM, posture, core stabilization and centralization of pain from RLE to lower back  for 43  minutes including:    Piriformis Stretch Right leg 20 sec hold x 6  Lumbar rolls x 3 mins with towel between knees  Bridges x 10  Prone - knee flexion x 10  Prone leg extension x 10  Prone Lumbar Ext x 10  MIA x 3mins       Gardenia received the following manual therapy techniques: Joint mobilizations and Soft tissue Mobilization were applied to the: lumbar spine  for 15 minutes including:  SL flexion of left side of lumbar spine; side-bending on right in prone to wedge disc; - RLE - hamstring stretching; In side lying - right piriformis STM, RLE into hip extension - lumbar extension. Prone - lumbar side bending on right side. Paraspinal skin rolling.      The patient received the following direct contact modalities after being cleared for  contraindications:     The patient received the following supervised modalities after being cleared for contradictions: moist heat to lumbar in 90/90 position x 15 mins followed by ice to same in 90/90 position x 15 mins.      Written Home Exercises Provided:   Prone lying - 2-3 pillows - gradual extension to MIA - slow, allow disc to migrate anteriorly; goal is to centralize pain into right hip/lower back.   Pt demo fair understanding of the education provided. Gardenia demonstrated fair return demonstration of activities.   nd   Education provided re:  Gardenia verbalized fair understanding of education provided.   No spiritual or educational barriers to learning provided    Assessment     Gardenia tolerated treatment much better today; Gardenia is moving with greater ease, can get up from supine to sit to stand independently; Walking is much more fluid and she can stand and hold a conversation; using straight cane for stability. Pain is more in her proximal thigh/hip today as opposed to further down her leg; She was able to sit with equal WB on both IT's after treatment; She is not walking with any type of lateral shift. . Needs to be vigilant with prone exercises and needs to move around as much as she is able to tolerate during the day - movement is important.   This is a 79 y.o. female referred to outpatient physical therapy and presents with a medical diagnosis of lumbar radiculopathy  and demonstrates limitations as described in the problem list. Pt prognosis is Good. Pt will continue to benefit from skilled outpatient physical therapy to address the deficits listed in the problem list, provide pt/family education and to maximize pt's level of independence in the home and community environment.     Goals as follows:Long Term Goals: 6 weeks     Pain: Decrease pain to no more than 2 /10 to allow for return to full participation in daily and recreational activities   Strength: Improve strength in core muscles to 4/5  for improved lumbopelvic stability  ROM: Improve ROM to 75% of normal limits   Functional scale: Improve score on Oswestry to <30% limitation   Lifting: Lift 10 lbs to waist level, 5 lbs to shoulder level, 5 lbs to overhead without pain or compensation  Walking: Increase walking distance and/or duration to 30 mins without pain   Postures: Increase sitting and/or standing duration to 30 mins without pain   Transfers: Perform sit <> stand and supine <> sit  transfers without increased pain or limitation  Exercise: demonstrate independence with home exercise program to maintain gains made in therapy.                     Plan     Continue with established Plan of Care towards PT goals.    Therapist: Ramona Ron, PT  11/1/2019

## 2019-11-20 ENCOUNTER — TELEPHONE (OUTPATIENT)
Dept: PODIATRY | Facility: CLINIC | Age: 79
End: 2019-11-20

## 2019-11-20 NOTE — TELEPHONE ENCOUNTER
----- Message from Alysha Spaulding sent at 11/20/2019  1:17 PM CST -----  Contact: Patient's  Sinan  Type: Needs Medical Advice    Who Called: Patient  Best Call Back Number: [phone]  Additional Information: Sinan is calling to get his wife in with him on 12/4/19 around his 3:30 appt.Please call back and advise.

## 2019-11-26 ENCOUNTER — CLINICAL SUPPORT (OUTPATIENT)
Dept: REHABILITATION | Facility: HOSPITAL | Age: 79
End: 2019-11-26
Payer: MEDICARE

## 2019-11-26 DIAGNOSIS — M54.41 ACUTE RIGHT-SIDED LOW BACK PAIN WITH RIGHT-SIDED SCIATICA: Primary | ICD-10-CM

## 2019-11-26 PROCEDURE — 97140 MANUAL THERAPY 1/> REGIONS: CPT | Mod: PN

## 2019-11-26 PROCEDURE — 97110 THERAPEUTIC EXERCISES: CPT | Mod: PN

## 2019-11-26 NOTE — PROGRESS NOTES
Physical Therapy Daily Note     Name: Gardenia ROWAN [facility] Number: [id number]  Diagnosis:   Encounter Diagnosis   Name Primary?    Acute right-sided low back pain with right-sided sciatica Yes     Physician:  Dr. Garcia   Precautions: standard  Visit #: 1 of 12   PTA Visit #: 1    Time In: 11:00 AM   Time Out:   12:20 PM       Subjective      Pt reports:  Gardenia has not been in for therapy in about 3 weeks; She contacted me several weeks ago and stated that therapy was just causing her too much pain.  She wanted to stay home and perform her hot/cold treatments and walk; Gardenia called me last week stating that she was doing better, decided not to return to Dr. Garcia's office - was not happy with treatment/office; She has made an appointment with Dr. Handy Diaz - unable to get into see him until March 2020.  Gardenia wanted to start back with therapy to do some strethcing exercises - nothing too strenuous.   Pain Scale: Gardenia rates pain on a scale of 0-10 to be 5 currently. - Right upper thigh pain not consistent; does not go below knee any longer.     Objective     Gardenia received individual therapeutic exercises to develop ROM, posture, core stabilization and centralization of pain from RLE to lower back  for 43  minutes including:  SKC x 10  DKC x 6  Lumbar rolls x 3 mins with towel between knees  Bridges x 10  Pelvic tilts x 10  Leg lengthening with arm lengthening x 10  Prone - knee flexion x 10  Prone leg extension x 10  Prone Lumbar Ext x 10  MIA x 3mins     Gardenia received the following manual therapy techniques: Joint mobilizations and Soft tissue Mobilization were applied to the: lumbar spine  for 15 minutes including:   RLE/LLE - hamstring stretching; In side lying - right piriformis STM, RLE into hip extension - lumbar extension. Prone - lumbar side bending on right side. Paraspinal skin rolling.      The patient received the following direct contact  modalities after being cleared for contraindications:     The patient received the following supervised modalities after being cleared for contradictions: moist heat to lumbar in 90/90 position x 15 mins followed by ice to same in 90/90 position x 15 mins.      Written Home Exercises Provided:   Gave Gardenia a written HEP with all of the exercises noted above   Pt demo fair understanding of the education provided. Gardenia demonstrated fair return demonstration of activities.     Education provided re:  Gardenia verbalized fair understanding of education provided.   No spiritual or educational barriers to learning provided    Assessment     Gardenia tolerated treatment much better today; Gardenia is moving with greater ease, can get up from supine to sit to stand independently; Walking is much more fluid and she can stand and hold a conversation; using straight cane for stability. Pain is more in her proximal thigh/hip today as opposed to further down her leg; She was able to sit with equal WB on both IT's after treatment; She is not walking with any type of lateral shift. . Needs to be vigilant with prone exercises and needs to move around as much as she is able to tolerate during the day - movement is important.   This is a 79 y.o. female referred to outpatient physical therapy and presents with a medical diagnosis of lumbar radiculopathy  and demonstrates limitations as described in the problem list. Pt prognosis is Good. Pt will continue to benefit from skilled outpatient physical therapy to address the deficits listed in the problem list, provide pt/family education and to maximize pt's level of independence in the home and community environment.     Goals as follows:Long Term Goals: 6 weeks - all goals remain appropriate.      Pain: Decrease pain to no more than 2 /10 to allow for return to full participation in daily and recreational activities   Strength: Improve strength in core muscles to 4/5 for improved  lumbopelvic stability  ROM: Improve ROM to 75% of normal limits   Functional scale: Improve score on Oswestry to <30% limitation   Lifting: Lift 10 lbs to waist level, 5 lbs to shoulder level, 5 lbs to overhead without pain or compensation  Walking: Increase walking distance and/or duration to 30 mins without pain   Postures: Increase sitting and/or standing duration to 30 mins without pain   Transfers: Perform sit <> stand and supine <> sit  transfers without increased pain or limitation  Exercise: demonstrate independence with home exercise program to maintain gains made in therapy.                     Plan     Continue with established Plan of Care towards PT goals.    Therapist: Ramona Ron, PT  11/26/2019

## 2019-12-03 ENCOUNTER — CLINICAL SUPPORT (OUTPATIENT)
Dept: REHABILITATION | Facility: HOSPITAL | Age: 79
End: 2019-12-03
Payer: MEDICARE

## 2019-12-03 DIAGNOSIS — M54.41 ACUTE RIGHT-SIDED LOW BACK PAIN WITH RIGHT-SIDED SCIATICA: Primary | ICD-10-CM

## 2019-12-03 PROCEDURE — 97110 THERAPEUTIC EXERCISES: CPT | Mod: PN

## 2019-12-03 NOTE — PROGRESS NOTES
"                                                    Physical Therapy Daily Note     Name: Gi ROWAN [facility] Number: [id number]  Diagnosis:   Encounter Diagnosis   Name Primary?    Acute right-sided low back pain with right-sided sciatica Yes     Physician:  Dr. Garcia   Precautions: standard  Visit #: 2 of 12   PTA Visit #: 1    Time In: 3:50 PM    Time Out:   5:00 PM      Subjective      Pt reports:   "I'm still having right thigh pain, but not as often, and my back is hurting a little bit today - this is new"  Explained to gi again about improvement in disc/stenosis pain - she has had nothing but leg pain since onset - as the pressure is relieved from peripheral nerve, she will start to note more back pain - this is a sign of improvement.  Reports that she hasn't really had a lot of time for exercises, but is doing her heat/cold treatments at least 2x per day and this is really helping.   Pain Scale: Gi rates pain on a scale of 0-10 to be 3 currently. - Right upper thigh pain not consistent; does not go below knee any longer.     Objective     Gi received individual therapeutic exercises to develop ROM, posture, core stabilization and centralization of pain from RLE to lower back  for 43  minutes including:  SKC x 10  DKC x 6  Lumbar rolls x 3 mins with towel between knees  Bridges x 10  Pelvic tilts x 10  Leg lengthening with arm lengthening x 10  Prone - knee flexion x 10  Prone leg extension x 10  Prone Lumbar Ext x 10  MIA x 3mins       Did Not Perform   Gi received the following manual therapy techniques: Joint mobilizations and Soft tissue Mobilization were applied to the: lumbar spine  for 15 minutes including:   RLE/LLE - hamstring stretching; In side lying - right piriformis STM, RLE into hip extension - lumbar extension. Prone - lumbar side bending on right side. Paraspinal skin rolling.      The patient received the following direct contact modalities after being cleared for " contraindications:     The patient received the following supervised modalities after being cleared for contradictions:     Written Home Exercises Provided:   Gave Gardenia a written HEP with all of the exercises noted above   Pt demo fair understanding of the education provided. Gardenia demonstrated fair return demonstration of activities.     Education provided re:  Gardenia verbalized fair understanding of education provided.   No spiritual or educational barriers to learning provided    Assessment     Gardenia tolerated treatment much better today; Gardenia is moving with greater ease, can get up from supine to sit to stand independently;Not using a AD for ambulation today;  Pain is more in her proximal thigh/hip today as opposed to further down her leg; ; She is not walking with any type of lateral shift. . Needs to be vigilant with prone exercises and needs to move around as much as she is able to tolerate during the day - movement is important. Required assistance for all of her exercises today - is not doing them at home.   This is a 79 y.o. female referred to outpatient physical therapy and presents with a medical diagnosis of lumbar radiculopathy  and demonstrates limitations as described in the problem list. Pt prognosis is Good. Pt will continue to benefit from skilled outpatient physical therapy to address the deficits listed in the problem list, provide pt/family education and to maximize pt's level of independence in the home and community environment.     Goals as follows:Long Term Goals: 6 weeks - all goals remain appropriate.      Pain: Decrease pain to no more than 2 /10 to allow for return to full participation in daily and recreational activities   Strength: Improve strength in core muscles to 4/5 for improved lumbopelvic stability  ROM: Improve ROM to 75% of normal limits   Functional scale: Improve score on Oswestry to <30% limitation   Lifting: Lift 10 lbs to waist level, 5 lbs to shoulder level, 5  lbs to overhead without pain or compensation  Walking: Increase walking distance and/or duration to 30 mins without pain   Postures: Increase sitting and/or standing duration to 30 mins without pain   Transfers: Perform sit <> stand and supine <> sit  transfers without increased pain or limitation  Exercise: demonstrate independence with home exercise program to maintain gains made in therapy.            Plan     Continue with established Plan of Care towards PT goals.    Therapist: Ramona Ron, PT  12/3/2019

## 2019-12-04 ENCOUNTER — OFFICE VISIT (OUTPATIENT)
Dept: PODIATRY | Facility: CLINIC | Age: 79
End: 2019-12-04
Payer: MEDICARE

## 2019-12-04 VITALS
HEART RATE: 77 BPM | TEMPERATURE: 98 F | HEIGHT: 65 IN | WEIGHT: 160 LBS | DIASTOLIC BLOOD PRESSURE: 66 MMHG | SYSTOLIC BLOOD PRESSURE: 127 MMHG | BODY MASS INDEX: 26.66 KG/M2

## 2019-12-04 DIAGNOSIS — M20.41 HAMMER TOES OF BOTH FEET: ICD-10-CM

## 2019-12-04 DIAGNOSIS — M20.42 HAMMER TOES OF BOTH FEET: ICD-10-CM

## 2019-12-04 DIAGNOSIS — M19.079 OSTEOARTHRITIS OF ANKLE AND FOOT, UNSPECIFIED LATERALITY: ICD-10-CM

## 2019-12-04 DIAGNOSIS — M20.12 VALGUS DEFORMITY OF BOTH GREAT TOES: ICD-10-CM

## 2019-12-04 DIAGNOSIS — M20.11 VALGUS DEFORMITY OF BOTH GREAT TOES: ICD-10-CM

## 2019-12-04 DIAGNOSIS — G60.9 IDIOPATHIC PERIPHERAL NEUROPATHY: Primary | ICD-10-CM

## 2019-12-04 PROCEDURE — 99213 OFFICE O/P EST LOW 20 MIN: CPT | Mod: PBBFAC | Performed by: PODIATRIST

## 2019-12-04 PROCEDURE — 99213 PR OFFICE/OUTPT VISIT, EST, LEVL III, 20-29 MIN: ICD-10-PCS | Mod: S$PBB,,, | Performed by: PODIATRIST

## 2019-12-04 PROCEDURE — 1159F PR MEDICATION LIST DOCUMENTED IN MEDICAL RECORD: ICD-10-PCS | Mod: ,,, | Performed by: PODIATRIST

## 2019-12-04 PROCEDURE — 1125F AMNT PAIN NOTED PAIN PRSNT: CPT | Mod: ,,, | Performed by: PODIATRIST

## 2019-12-04 PROCEDURE — 1159F MED LIST DOCD IN RCRD: CPT | Mod: ,,, | Performed by: PODIATRIST

## 2019-12-04 PROCEDURE — 1125F PR PAIN SEVERITY QUANTIFIED, PAIN PRESENT: ICD-10-PCS | Mod: ,,, | Performed by: PODIATRIST

## 2019-12-04 PROCEDURE — 99999 PR PBB SHADOW E&M-EST. PATIENT-LVL III: ICD-10-PCS | Mod: PBBFAC,,, | Performed by: PODIATRIST

## 2019-12-04 PROCEDURE — 99999 PR PBB SHADOW E&M-EST. PATIENT-LVL III: CPT | Mod: PBBFAC,,, | Performed by: PODIATRIST

## 2019-12-04 PROCEDURE — 99213 OFFICE O/P EST LOW 20 MIN: CPT | Mod: S$PBB,,, | Performed by: PODIATRIST

## 2019-12-04 RX ORDER — CYCLOBENZAPRINE HCL 5 MG
TABLET ORAL
Refills: 0 | COMMUNITY
Start: 2019-10-03 | End: 2022-06-06

## 2019-12-04 RX ORDER — GABAPENTIN 300 MG/1
CAPSULE ORAL
COMMUNITY
Start: 2019-11-08 | End: 2021-12-28

## 2019-12-04 RX ORDER — METHYLPREDNISOLONE 4 MG/1
TABLET ORAL
Refills: 0 | COMMUNITY
Start: 2019-10-10 | End: 2021-12-28

## 2019-12-04 RX ORDER — IBUPROFEN 800 MG/1
TABLET ORAL
Refills: 0 | COMMUNITY
Start: 2019-11-09 | End: 2022-12-03

## 2019-12-04 RX ORDER — PANTOPRAZOLE SODIUM 40 MG/1
TABLET, DELAYED RELEASE ORAL
COMMUNITY
Start: 2019-11-10

## 2019-12-04 RX ORDER — HYDROCODONE BITARTRATE AND ACETAMINOPHEN 5; 325 MG/1; MG/1
TABLET ORAL
Refills: 0 | COMMUNITY
Start: 2019-10-06 | End: 2021-12-28

## 2019-12-04 RX ORDER — TRAMADOL HYDROCHLORIDE 50 MG/1
50 TABLET ORAL EVERY 4 HOURS PRN
Refills: 0 | COMMUNITY
Start: 2019-10-01 | End: 2021-12-28

## 2019-12-04 NOTE — LETTER
December 8, 2019      Oxana Greene MD  [address]  [place]  [facility] [place] [address]           [facility]  [address] [facility] [place] [address]  Phone: [phone]  Fax: [phone]          Patient: Gardenia Dent   MR Number: [id number]   YOB: 1940   Date of Visit: 12/4/2019       Dear Dr. Oxana Greene:    Thank you for referring Gardenia Dent to me for evaluation. Attached you will find relevant portions of my assessment and plan of care.    If you have questions, please do not hesitate to call me. I look forward to following Gardenia Dent along with you.    Sincerely,    Jose Manuel Brice, LIA    Enclosure  CC:  No Recipients    If you would like to receive this communication electronically, please contact [email or web address] or [phone] to request more information on [employer] Link access.    For providers and/or their staff who would like to refer a patient to [facility], please contact us through our one-stop-shop provider referral line, [facility], at [phone].    If you feel you have received this communication in error or would no longer like to receive these types of communications, please e-mail [email or web address]

## 2019-12-05 ENCOUNTER — CLINICAL SUPPORT (OUTPATIENT)
Dept: REHABILITATION | Facility: HOSPITAL | Age: 79
End: 2019-12-05
Payer: MEDICARE

## 2019-12-05 DIAGNOSIS — M54.41 ACUTE RIGHT-SIDED LOW BACK PAIN WITH RIGHT-SIDED SCIATICA: Primary | ICD-10-CM

## 2019-12-05 PROCEDURE — 97010 HOT OR COLD PACKS THERAPY: CPT | Mod: PN

## 2019-12-05 PROCEDURE — 97110 THERAPEUTIC EXERCISES: CPT | Mod: PN

## 2019-12-06 NOTE — PROGRESS NOTES
"                                                    Physical Therapy Daily Note     [facility] [id number]  Diagnosis:   Encounter Diagnosis   Name Primary?    Acute right-sided low back pain with right-sided sciatica Yes     Physician:  Dr. Garcia   Precautions: standard  Visit #: 3 of 12   PTA Visit #: 1    Time In:  1:00 PM   Time Out:   2:30 PM     Subjective      Pt reports:   "I'm doing ok, my Right leg feels tight today and heavy"    Pain Scale: Gardenia rates pain on a scale of 0-10 to be 3 currently. - Right upper thigh pain not consistent; does not go below knee any longer.     Objective     Gardenia received individual therapeutic exercises to develop ROM, posture, core stabilization and centralization of pain from RLE to lower back  for 43  minutes including:  SKC x 10  DKC x 6  Lumbar rolls x 3 mins with towel between knees  Bridges x 10  Pelvic tilts x 10  Leg lengthening with arm lengthening x 10  S/L hip abduction x 10  Clams / Reverse clams x 10   Prone - knee flexion x 10  Prone leg extension x 10 - changed to alternate arm and leg   Prone Lumbar Ext x 10  MIA x 3mins       Did Not Perform   Gardenia received the following manual therapy techniques: Joint mobilizations and Soft tissue Mobilization were applied to the: lumbar spine  for 15 minutes including:   RLE/LLE - hamstring stretching; In side lying - right piriformis STM, RLE into hip extension - lumbar extension. Prone - lumbar side bending on right side. Paraspinal skin rolling.      The patient received the following direct contact modalities after being cleared for contraindications:     The patient received the following supervised modalities after being cleared for contradictions:  Moist heat to lumbar spine x 15 mins in 90/90 position followed by ice x 15 mins in same position     Written Home Exercises Provided:   Gave Gardenia a written HEP with all of the exercises noted above   Pt demo fair understanding of the " education provided. Gardenia demonstrated fair return demonstration of activities.     Education provided re:  Gardenia verbalized fair understanding of education provided.   No spiritual or educational barriers to learning provided    Assessment     Gardenia tolerated treatment well; Noting some heaviness in her RLE today; Able to add a few more exercises today without increasing pain;   Pain is more in her proximal thigh/hip today as opposed to further down her leg; ; She is not walking with any type of lateral shift. . Needs to be vigilant with prone exercises and needs to move around as much as she is able to tolerate during the day - movement is important. Required assistance for all of her exercises today - is not doing them at home.   This is a 79 y.o. female referred to outpatient physical therapy and presents with a medical diagnosis of lumbar radiculopathy  and demonstrates limitations as described in the problem list. Pt prognosis is Good. Pt will continue to benefit from skilled outpatient physical therapy to address the deficits listed in the problem list, provide pt/family education and to maximize pt's level of independence in the home and community environment.     Goals as follows:Long Term Goals: 6 weeks - all goals remain appropriate.      Pain: Decrease pain to no more than 2 /10 to allow for return to full participation in daily and recreational activities   Strength: Improve strength in core muscles to 4/5 for improved lumbopelvic stability  ROM: Improve ROM to 75% of normal limits   Functional scale: Improve score on Oswestry to <30% limitation   Lifting: Lift 10 lbs to waist level, 5 lbs to shoulder level, 5 lbs to overhead without pain or compensation  Walking: Increase walking distance and/or duration to 30 mins without pain   Postures: Increase sitting and/or standing duration to 30 mins without pain   Transfers: Perform sit <> stand and supine <> sit  transfers without increased pain or  limitation  Exercise: demonstrate independence with home exercise program to maintain gains made in therapy.            Plan     Continue with established Plan of Care towards PT goals.    Therapist: Ramona Ron, PT  12/6/2019

## 2019-12-08 PROBLEM — M20.41 HAMMER TOES OF BOTH FEET: Status: ACTIVE | Noted: 2019-12-08

## 2019-12-08 PROBLEM — M20.12 VALGUS DEFORMITY OF BOTH GREAT TOES: Status: ACTIVE | Noted: 2019-12-08

## 2019-12-08 PROBLEM — M20.42 HAMMER TOES OF BOTH FEET: Status: ACTIVE | Noted: 2019-12-08

## 2019-12-08 PROBLEM — M19.079 OSTEOARTHRITIS OF ANKLE AND FOOT: Status: ACTIVE | Noted: 2019-12-08

## 2019-12-08 PROBLEM — M20.11 VALGUS DEFORMITY OF BOTH GREAT TOES: Status: ACTIVE | Noted: 2019-12-08

## 2019-12-08 NOTE — PROGRESS NOTES
Subjective:      Patient ID: Gardenia Dent is a 79 y.o. female.    Chief Complaint: Follow-up and Nail Problem  Patient presents for follow up neuropathic pain, not diabetic related.   Relates Cymbalta 20 mg daily has been helpful throughout the day, still increased pain at night with prolonged walking and standing, patient is very active.  She relates no side effects to the medication.  She continues to wear appropriate shoes to accommodate bunions/arthritis using toe spacers daily.      Review of Systems   Musculoskeletal: Positive for arthritis, joint pain and joint swelling.   All other systems reviewed and are negative.       Constitutional   Well-nourished, no distress, well oriented    Cardiovascular          No chest pain, no shortness of breath    Respiratory         No cough, no congestion    Musculoskeletal        YES muscle aches, YES arthralgias/joint pain/hands    Neurologic         NEUROPATHY         Objective:      Physical Exam   Constitutional: She appears well-developed and well-nourished.   Cardiovascular:   Pulses:       Dorsalis pedis pulses are 1+ on the right side, and 1+ on the left side.        Posterior tibial pulses are 1+ on the right side, and 1+ on the left side.   Pulmonary/Chest: Effort normal.   Musculoskeletal: She exhibits edema, tenderness and deformity.        Right foot: There is deformity.        Left foot: There is deformity.   Feet:   Right Foot:   Protective Sensation: 4 sites tested. 2 sites sensed.   Skin Integrity: Positive for erythema and warmth.   Left Foot:   Protective Sensation: 4 sites tested. 2 sites sensed.   Skin Integrity: Positive for erythema and warmth.   Neurological: She is alert.   Skin: Skin is warm. Capillary refill takes 2 to 3 seconds.   Psychiatric: She has a normal mood and affect. Her behavior is normal. Judgment and thought content normal.   Nursing note and vitals reviewed.      Integumentary   Narrow, bony foot type and structure with lack  of fat pad  Prominent medial eminence HAV right>left foot  Hammertoe second digit right foot, stable    Neurological   Neurological: gross sensation intact.   Experiences paresthesias consistent with neuropathy, no pain upon compression of web spaces were dorsal cutaneous nerves bilateral feet     Musculoskeletal   Muscle Strength and Tone: normal/  intact/ excellent for age, normal tone    Joints, Bones, and Muscles: limited ROM, hammertoe deformities/semirigid hammertoe second digit right  HAV with bunion/ moderate HAV right, prominent medial eminence, limited range of motion          Assessment:       Encounter Diagnoses   Name Primary?    Idiopathic peripheral neuropathy Yes    Valgus deformity of both great toes     Hammer toes of both feet     Osteoarthritis of ankle and foot, unspecified laterality          Plan:       Gardenia was seen today for follow-up and nail problem.    Diagnoses and all orders for this visit:    Idiopathic peripheral neuropathy    Valgus deformity of both great toes    Hammer toes of both feet    Osteoarthritis of ankle and foot, unspecified laterality      Patient presents for follow-up of neuropathy in both lower extremity she also has multiple deformities of both lower extremities I have advised the patient the importance of keeping these areas protected proper shoe gear.  We reviewed appropriate shoes, continue use of spacers for bunions.  Patient had several ingrowing toenails today that she was really not aware of likely related to her neuropathy that was positive erythema edema and irritation medial lateral border bilateral hallux these were debrided today.  While these were ingrown they did not appear currently infected I have advised the patient she needs to monitor her feet closely make sure that she is wearing proper shoes that do not rub or irritate her feet.  Counseled the patient on her conditions, their implications and medical management.  Instructed patient to  contact the office with any changes, questions, concerns, worsening of symptoms. Patient verbalized understanding.   Total face to face time, exam, assessment, treatment, discussion, documentation 15 minutes, more than half this time spent on consultation and coordination of care.   Follow up 3 months.This note was created using M*Modal voice recognition software that occasionally misinterpreted phrases or words.

## 2019-12-09 ENCOUNTER — CLINICAL SUPPORT (OUTPATIENT)
Dept: REHABILITATION | Facility: HOSPITAL | Age: 79
End: 2019-12-09
Payer: MEDICARE

## 2019-12-09 DIAGNOSIS — M54.41 ACUTE RIGHT-SIDED LOW BACK PAIN WITH RIGHT-SIDED SCIATICA: Primary | ICD-10-CM

## 2019-12-09 PROCEDURE — 97110 THERAPEUTIC EXERCISES: CPT | Mod: PN

## 2019-12-09 PROCEDURE — 97140 MANUAL THERAPY 1/> REGIONS: CPT | Mod: PN

## 2019-12-09 NOTE — PROGRESS NOTES
"                                                    Physical Therapy Daily Note     Name: Gardenia ROWAN [facility] Number: [id number]  Diagnosis:   Encounter Diagnosis   Name Primary?    Acute right-sided low back pain with right-sided sciatica Yes     Physician:  Dr. Garcia   Precautions: standard  Visit #: 4 of 12   PTA Visit #: 1    Time In:  1:00 PM   Time Out:   2:30 PM     Subjective      Pt reports:   " I had a really bad night, I just couldn't get comfortable and didn't get much sleep. My right leg is still giving me problems.   Pain Scale: Gardenia rates pain on a scale of 0-10 to be 3 currently. - Right upper thigh pain not consistent; does not go below knee any longer.     Objective     Gardenia received individual therapeutic exercises to develop ROM, posture, core stabilization and centralization of pain from RLE to lower back  for 43  minutes including:  SKC x 10  DKC x 6  Lumbar rolls x 3 mins with towel between knees  Bridges x 10  Pelvic tilts x 10  Leg lengthening with arm lengthening x 10  S/L hip abduction x 10  Clams / Reverse clams x 10   Prone - knee flexion x 10  Prone leg extension x 10 - too much pain with alternate bob and leg - back to just leg   Prone Lumbar Ext x 10  MIA x 3mins         Gardenia received the following manual therapy techniques: Joint mobilizations and Soft tissue Mobilization were applied to the: lumbar spine  for 15 minutes including:   RLE/LLE - hamstring stretching; In side lying - right piriformis STM, RLE into hip extension - lumbar extension. Prone - lumbar side bending on right side. Paraspinal skin rolling.      The patient received the following direct contact modalities after being cleared for contraindications:     The patient received the following supervised modalities after being cleared for contradictions:  Moist heat to lumbar spine x 15 mins in 90/90 position followed by ice x 15 mins in same position     Written Home Exercises Provided:   Gave Gardenia nate " written HEP with all of the exercises noted above   Pt demo fair understanding of the education provided. Gardenia demonstrated fair return demonstration of activities.     Education provided re:  Gardenia verbalized fair understanding of education provided.   No spiritual or educational barriers to learning provided    Assessment     Gardenia tolerated treatment well;ble to resolve Right leg pain with extension exercises;  Re-educated Gardenia on her lower back issues and why they seem to get better then reoccur;  She is still convinced that using heat/cold alternating these is what is going to solve her problems; Told her that moving was the key - moving her lumbar spine and hips will help to decrease pressure on her nerve. She is not walking with any type of lateral shift. . Needs to be vigilant with prone exercises and needs to move around as much as she is able to tolerate during the day - movement is important. Required assistance for all of her exercises today - is not doing them at home.   This is a 79 y.o. female referred to outpatient physical therapy and presents with a medical diagnosis of lumbar radiculopathy  and demonstrates limitations as described in the problem list. Pt prognosis is Good. Pt will continue to benefit from skilled outpatient physical therapy to address the deficits listed in the problem list, provide pt/family education and to maximize pt's level of independence in the home and community environment.     Goals as follows:Long Term Goals: 6 weeks - all goals remain appropriate.      Pain: Decrease pain to no more than 2 /10 to allow for return to full participation in daily and recreational activities   Strength: Improve strength in core muscles to 4/5 for improved lumbopelvic stability  ROM: Improve ROM to 75% of normal limits   Functional scale: Improve score on Oswestry to <30% limitation   Lifting: Lift 10 lbs to waist level, 5 lbs to shoulder level, 5 lbs to overhead without pain or  compensation  Walking: Increase walking distance and/or duration to 30 mins without pain   Postures: Increase sitting and/or standing duration to 30 mins without pain   Transfers: Perform sit <> stand and supine <> sit  transfers without increased pain or limitation  Exercise: demonstrate independence with home exercise program to maintain gains made in therapy.            Plan     Continue with established Plan of Care towards PT goals.    Therapist: Ramona Ron, PT  12/9/2019

## 2019-12-12 ENCOUNTER — CLINICAL SUPPORT (OUTPATIENT)
Dept: REHABILITATION | Facility: HOSPITAL | Age: 79
End: 2019-12-12
Payer: MEDICARE

## 2019-12-12 DIAGNOSIS — M54.41 ACUTE RIGHT-SIDED LOW BACK PAIN WITH RIGHT-SIDED SCIATICA: Primary | ICD-10-CM

## 2019-12-12 PROCEDURE — 97010 HOT OR COLD PACKS THERAPY: CPT | Mod: PN

## 2019-12-12 PROCEDURE — 97140 MANUAL THERAPY 1/> REGIONS: CPT | Mod: PN

## 2019-12-12 PROCEDURE — 97110 THERAPEUTIC EXERCISES: CPT | Mod: PN

## 2019-12-16 ENCOUNTER — CLINICAL SUPPORT (OUTPATIENT)
Dept: REHABILITATION | Facility: HOSPITAL | Age: 79
End: 2019-12-16
Payer: MEDICARE

## 2019-12-16 DIAGNOSIS — M54.41 ACUTE RIGHT-SIDED LOW BACK PAIN WITH RIGHT-SIDED SCIATICA: Primary | ICD-10-CM

## 2019-12-16 PROCEDURE — 97140 MANUAL THERAPY 1/> REGIONS: CPT | Mod: PN

## 2019-12-16 PROCEDURE — 97110 THERAPEUTIC EXERCISES: CPT | Mod: PN

## 2019-12-17 NOTE — PROGRESS NOTES
"                                                    Physical Therapy Daily Note     Name: Gardenia ROWAN [facility] Number: [id number]  Diagnosis:   Encounter Diagnosis   Name Primary?    Acute right-sided low back pain with right-sided sciatica Yes     Physician:  Dr. Garcia   Precautions: standard  Visit #: 6 of 12   PTA Visit  0    Time In:  1:00  PM   Time Out:   2:10 PM     Subjective      Pt reports:   "I'm still having some trouble sleeping, I just can't get comfortable"  "Last night it was both knees that were hurting, I just don't understand"  Aki asked me about an Inversion table - she purchased one off of [employer] - it's a Jaclyn.  Told her that I felt it would definitely be of benefit to her.   Pain Scale: Gardenia rates pain on a scale of 0-10 to be 3 currently. - Right upper thigh pain not consistent; does not go below knee any longer.     Objective     Gardenia received individual therapeutic exercises to develop ROM, posture, core stabilization and centralization of pain from RLE to lower back  for 43  minutes including:    Nu-Step - 10 mins x 2 level 5    SKC x 10  DKC x 6  Lumbar rolls x 3 mins with towel between knees  Bridges x 10  Pelvic tilts x 10  Leg lengthening with arm lengthening x 10  S/L hip abduction x 10  Clams / Reverse clams x 10   Prone - knee flexion x 10  Prone leg extension x 10 - too much pain with alternate bob and leg - back to just leg   Prone Lumbar Ext x 10  MIA x 3mins         Gardenia received the following manual therapy techniques: Joint mobilizations and Soft tissue Mobilization were applied to the: lumbar spine  for 15 minutes including:   RLE/LLE - hamstring stretching; In side lying - right piriformis STM, RLE into hip extension - lumbar extension. Prone - lumbar side bending on right side. Paraspinal skin rolling.      The patient received the following direct contact modalities after being cleared for contraindications:     DNP heat/ice today   The patient received the " following supervised modalities after being cleared for contradictions:  Moist heat to lumbar spine x 15 mins in 90/90 position followed by ice x 15 mins in same position     Written Home Exercises Provided:   Gave Gardenia a written HEP with all of the exercises noted above   Pt demo fair understanding of the education provided. Gardenia demonstrated fair return demonstration of activities.     Education provided re:  Gardenia verbalized fair understanding of education provided.   No spiritual or educational barriers to learning provided    Assessment     Gardenia tolerated treatment well;ble to resolve Right leg pain with extension exercises;  Re-educated Gardenia on her lower back issues and why they seem to get better then reoccur;  She is still convinced that using heat/cold alternating these is what is going to solve her problems; Told her that moving was the key - moving her lumbar spine and hips will help to decrease pressure on her nerve. She is not walking with any type of lateral shift. . Needs to be vigilant with prone exercises and needs to move around as much as she is able to tolerate during the day - movement is important. Required assistance for all of her exercises today - is not doing them at home.   This is a 79 y.o. female referred to outpatient physical therapy and presents with a medical diagnosis of lumbar radiculopathy  and demonstrates limitations as described in the problem list. Pt prognosis is Good. Pt will continue to benefit from skilled outpatient physical therapy to address the deficits listed in the problem list, provide pt/family education and to maximize pt's level of independence in the home and community environment.     Goals as follows:Long Term Goals: 6 weeks - all goals remain appropriate.      Pain: Decrease pain to no more than 2 /10 to allow for return to full participation in daily and recreational activities   Strength: Improve strength in core muscles to 4/5 for improved  lumbopelvic stability  ROM: Improve ROM to 75% of normal limits   Functional scale: Improve score on Oswestry to <30% limitation   Lifting: Lift 10 lbs to waist level, 5 lbs to shoulder level, 5 lbs to overhead without pain or compensation  Walking: Increase walking distance and/or duration to 30 mins without pain   Postures: Increase sitting and/or standing duration to 30 mins without pain   Transfers: Perform sit <> stand and supine <> sit  transfers without increased pain or limitation  Exercise: demonstrate independence with home exercise program to maintain gains made in therapy.            Plan     Continue with established Plan of Care towards PT goals.    Therapist: Ramona Ron, PT  12/16/2019

## 2019-12-20 ENCOUNTER — CLINICAL SUPPORT (OUTPATIENT)
Dept: REHABILITATION | Facility: HOSPITAL | Age: 79
End: 2019-12-20
Payer: MEDICARE

## 2019-12-20 DIAGNOSIS — M54.41 ACUTE RIGHT-SIDED LOW BACK PAIN WITH RIGHT-SIDED SCIATICA: Primary | ICD-10-CM

## 2019-12-20 PROCEDURE — 97110 THERAPEUTIC EXERCISES: CPT | Mod: PN

## 2019-12-20 PROCEDURE — 97140 MANUAL THERAPY 1/> REGIONS: CPT | Mod: PN

## 2019-12-20 NOTE — PROGRESS NOTES
Physical Therapy Daily Note     Name: Gardenia ROWAN [facility] Number: [id number]  Diagnosis:   Encounter Diagnosis   Name Primary?    Acute right-sided low back pain with right-sided sciatica Yes     Physician:  Dr. Garcia   Precautions: standard  Visit #: 8 of 12   PTA Visit  0    Time In:   9:00 AM   Time Out:   10:00 AM     Subjective      Pt reports:  I'm doing ok  Today; I thought I was getting sciatica into both of my thighs, but it seems to have gone away.   Pain Scale: Gardenia rates pain on a scale of 0-10 to be 2  currently. .     Objective     Gardenia received individual therapeutic exercises to develop ROM, posture, core stabilization and centralization of pain from RLE to lower back  for 43  minutes including:    Nu-Step - 10 mins x 2 level 5    SKC x 10  DKC x 6  Lumbar rolls x 3 mins with towel between knees  Bridges x 10  Pelvic tilts x 10  Leg lengthening with arm lengthening x 10  S/L hip abduction x 10  Clams / Reverse clams x 10   Prone - knee flexion x 10  Prone leg extension x 10 - too much pain with alternate bob and leg - back to just leg   Prone Lumbar Ext x 10  MIA x 3mins         Gardenia received the following manual therapy techniques: Joint mobilizations and Soft tissue Mobilization were applied to the: lumbar spine  for 15 minutes including:   RLE/LLE - hamstring stretching; In side lying - right piriformis STM, RLE into hip extension - lumbar extension. Prone - lumbar side bending on right side. Paraspinal skin rolling.      The patient received the following direct contact modalities after being cleared for contraindications:     DNP heat/ice today   The patient received the following supervised modalities after being cleared for contradictions:  Moist heat to lumbar spine x 15 mins in 90/90 position followed by ice x 15 mins in same position     Written Home Exercises Provided:   Gave Gardenia a written HEP with all of the exercises noted  above   Pt demo fair understanding of the education provided. Gardenia demonstrated fair return demonstration of activities.     Education provided re:  Gardenia verbalized fair understanding of education provided.   No spiritual or educational barriers to learning provided    Assessment     Gardenia tolerated treatment well. She still has some issues while sleeping cannot seem to find a comfortable position in order to sleep most of the night.  She is able to ambulate without having to use an AD. Exercises keeping leg pain in check for the most part.   This is a 79 y.o. female referred to outpatient physical therapy and presents with a medical diagnosis of lumbar radiculopathy  and demonstrates limitations as described in the problem list. Pt prognosis is Good. Pt will continue to benefit from skilled outpatient physical therapy to address the deficits listed in the problem list, provide pt/family education and to maximize pt's level of independence in the home and community environment.     Goals as follows:Long Term Goals: 6 weeks - all goals remain appropriate.      Pain: Decrease pain to no more than 2 /10 to allow for return to full participation in daily and recreational activities   Strength: Improve strength in core muscles to 4/5 for improved lumbopelvic stability  ROM: Improve ROM to 75% of normal limits   Functional scale: Improve score on Oswestry to <30% limitation   Lifting: Lift 10 lbs to waist level, 5 lbs to shoulder level, 5 lbs to overhead without pain or compensation  Walking: Increase walking distance and/or duration to 30 mins without pain   Postures: Increase sitting and/or standing duration to 30 mins without pain   Transfers: Perform sit <> stand and supine <> sit  transfers without increased pain or limitation  Exercise: demonstrate independence with home exercise program to maintain gains made in therapy.            Plan     Continue with established Plan of Care towards PT goals.    Therapist:  Ramona Ron, PT  12/20/2019

## 2019-12-23 ENCOUNTER — CLINICAL SUPPORT (OUTPATIENT)
Dept: REHABILITATION | Facility: HOSPITAL | Age: 79
End: 2019-12-23
Payer: MEDICARE

## 2019-12-23 DIAGNOSIS — M54.41 ACUTE RIGHT-SIDED LOW BACK PAIN WITH RIGHT-SIDED SCIATICA: Primary | ICD-10-CM

## 2019-12-23 PROCEDURE — 97140 MANUAL THERAPY 1/> REGIONS: CPT | Mod: PN

## 2019-12-23 PROCEDURE — 97110 THERAPEUTIC EXERCISES: CPT | Mod: PN

## 2019-12-23 NOTE — PROGRESS NOTES
Physical Therapy Daily Note     Name: Gardenia ROWAN [facility] Number: [id number]  Diagnosis:   Encounter Diagnosis   Name Primary?    Acute right-sided low back pain with right-sided sciatica Yes     Physician:  Dr. Garcia   Precautions: standard  Visit #: 9 of 12   PTA Visit  0    Time In:   1:00 PM    Time Out:   1:50 PM     Subjective      Pt reports:  I'm doing ok   We rushed to get here - I had an eye doctor appointment in [place] this AM; Gardenia stated that she got her inversion table, but hasn't set it up yet.   Pain Scale: Gardenia rates pain on a scale of 0-10 to be 2-3  currently. .     Objective     Gardenia received individual therapeutic exercises to develop ROM, posture, core stabilization and centralization of pain from RLE to lower back  for 43  minutes including:    Nu-Step - 10 mins x 2 level 5    SKC x 10  DKC x 6  Lumbar rolls x 3 mins with towel between knees  Bridges x 10  Pelvic tilts x 10  Leg lengthening with arm lengthening x 10  S/L hip abduction x 10  Clams / Reverse clams x 10   Prone - knee flexion x 10  Prone leg extension x 10 - too much pain with alternate bob and leg - back to just leg   Prone Lumbar Ext x 10  MIA x 3mins         Gardenia received the following manual therapy techniques: Joint mobilizations and Soft tissue Mobilization were applied to the: lumbar spine  for 15 minutes including:   RLE/LLE - hamstring stretching; In side lying - right piriformis STM, RLE into hip extension - lumbar extension. Prone - lumbar side bending on right side. Paraspinal skin rolling.      The patient received the following direct contact modalities after being cleared for contraindications:     DNP heat/ice today   The patient received the following supervised modalities after being cleared for contradictions:  Moist heat to lumbar spine x 15 mins in 90/90 position followed by ice x 15 mins in same position     Written Home Exercises Provided:    Gave Gardeina a written HEP with all of the exercises noted above   Pt demo fair understanding of the education provided. Gardenia demonstrated fair return demonstration of activities.     Education provided re:  Gardenia verbalized fair understanding of education provided.   No spiritual or educational barriers to learning provided    Assessment     Gardenia tolerated treatment well. She still has some issues while sleeping cannot seem to find a comfortable position in order to sleep most of the night.  She is able to ambulate without having to use an AD. Exercises keeping leg pain in check for the most part.   This is a 79 y.o. female referred to outpatient physical therapy and presents with a medical diagnosis of lumbar radiculopathy  and demonstrates limitations as described in the problem list. Pt prognosis is Good. Pt will continue to benefit from skilled outpatient physical therapy to address the deficits listed in the problem list, provide pt/family education and to maximize pt's level of independence in the home and community environment.     Goals as follows:Long Term Goals: 6 weeks - all goals remain appropriate.      Pain: Decrease pain to no more than 2 /10 to allow for return to full participation in daily and recreational activities   Strength: Improve strength in core muscles to 4/5 for improved lumbopelvic stability  ROM: Improve ROM to 75% of normal limits   Functional scale: Improve score on Oswestry to <30% limitation   Lifting: Lift 10 lbs to waist level, 5 lbs to shoulder level, 5 lbs to overhead without pain or compensation  Walking: Increase walking distance and/or duration to 30 mins without pain   Postures: Increase sitting and/or standing duration to 30 mins without pain   Transfers: Perform sit <> stand and supine <> sit  transfers without increased pain or limitation  Exercise: demonstrate independence with home exercise program to maintain gains made in therapy.            Plan     Continue  with established Plan of Care towards PT goals.    Therapist: Ramona Ron, PT  12/23/2019

## 2019-12-27 ENCOUNTER — CLINICAL SUPPORT (OUTPATIENT)
Dept: REHABILITATION | Facility: HOSPITAL | Age: 79
End: 2019-12-27
Payer: MEDICARE

## 2019-12-27 DIAGNOSIS — M54.41 ACUTE RIGHT-SIDED LOW BACK PAIN WITH RIGHT-SIDED SCIATICA: Primary | ICD-10-CM

## 2019-12-27 PROCEDURE — 97110 THERAPEUTIC EXERCISES: CPT | Mod: PN

## 2019-12-27 NOTE — PROGRESS NOTES
Physical Therapy Daily Note     Name: Gardenia ROWAN [facility] Number: [id number]  Diagnosis:   Encounter Diagnosis   Name Primary?    Acute right-sided low back pain with right-sided sciatica Yes     Physician:  Dr. Garcia   Precautions: standard  Visit #:  10  of 12   PTA Visit  0    Time In:   12:45 PM    Time Out:   1:20 PM     Subjective      Pt reports:  I'm doing ok;   Pain Scale: Gardenia rates pain on a scale of 0-10 to be 2-3  currently. .     Objective     Gardenia received individual therapeutic exercises to develop ROM, posture, core stabilization and centralization of pain from RLE to lower back  for 43  minutes including:    Nu-Step - 10 mins x 2 level 5    SKC x 10  DKC x 6  Lumbar rolls x 3 mins with towel between knees  Bridges x 10  Pelvic tilts x 10  Leg lengthening with arm lengthening x 10  S/L hip abduction x 10  Clams / Reverse clams x 10   Prone - knee flexion x 10  Prone leg extension x 10 - too much pain with alternate bob and leg - back to just leg   Prone Lumbar Ext x 10  MIA x 3mins         Gardenia received the following manual therapy techniques: Joint mobilizations and Soft tissue Mobilization were applied to the: lumbar spine  for 15 minutes including: Did not perform    RLE/LLE - hamstring stretching; In side lying - right piriformis STM, RLE into hip extension - lumbar extension. Prone - lumbar side bending on right side. Paraspinal skin rolling.      The patient received the following direct contact modalities after being cleared for contraindications:     DNP heat/ice today   The patient received the following supervised modalities after being cleared for contradictions:  Moist heat to lumbar spine x 15 mins in 90/90 position followed by ice x 15 mins in same position     Written Home Exercises Provided:   Gave Gardenia a written HEP with all of the exercises noted above   Pt demo fair understanding of the education provided. Gardenia  demonstrated fair return demonstration of activities.     Education provided re:  Gardenia verbalized fair understanding of education provided.   No spiritual or educational barriers to learning provided    Assessment     Gardenia tolerated treatment well. She only stayed for exercise - decided she did not want to do anything else. She still has some issues while sleeping cannot seem to find a comfortable position in order to sleep most of the night.  She is able to ambulate without having to use an AD. Exercises keeping leg pain in check for the most part.   This is a 79 y.o. female referred to outpatient physical therapy and presents with a medical diagnosis of lumbar radiculopathy  and demonstrates limitations as described in the problem list. Pt prognosis is Good. Pt will continue to benefit from skilled outpatient physical therapy to address the deficits listed in the problem list, provide pt/family education and to maximize pt's level of independence in the home and community environment.     Goals as follows:Long Term Goals: 6 weeks - all goals remain appropriate.      Pain: Decrease pain to no more than 2 /10 to allow for return to full participation in daily and recreational activities   Strength: Improve strength in core muscles to 4/5 for improved lumbopelvic stability  ROM: Improve ROM to 75% of normal limits   Functional scale: Improve score on Oswestry to <30% limitation   Lifting: Lift 10 lbs to waist level, 5 lbs to shoulder level, 5 lbs to overhead without pain or compensation  Walking: Increase walking distance and/or duration to 30 mins without pain   Postures: Increase sitting and/or standing duration to 30 mins without pain   Transfers: Perform sit <> stand and supine <> sit  transfers without increased pain or limitation  Exercise: demonstrate independence with home exercise program to maintain gains made in therapy.            Plan     Continue with established Plan of Care towards PT  goals.    Therapist: Ramona Ron, PT  12/27/2019

## 2019-12-30 ENCOUNTER — CLINICAL SUPPORT (OUTPATIENT)
Dept: REHABILITATION | Facility: HOSPITAL | Age: 79
End: 2019-12-30
Payer: MEDICARE

## 2019-12-30 DIAGNOSIS — M54.41 ACUTE RIGHT-SIDED LOW BACK PAIN WITH RIGHT-SIDED SCIATICA: Primary | ICD-10-CM

## 2019-12-30 PROCEDURE — 97110 THERAPEUTIC EXERCISES: CPT | Mod: PN

## 2019-12-30 PROCEDURE — 97140 MANUAL THERAPY 1/> REGIONS: CPT | Mod: PN

## 2020-01-02 NOTE — PROGRESS NOTES
Physical Therapy Daily Note     Name: Gardenia ROWAN [facility] Number: [id number]  Diagnosis:   Encounter Diagnosis   Name Primary?    Acute right-sided low back pain with right-sided sciatica Yes     Physician:  Dr. Garcia   Precautions: standard  Visit #:  11 of 12   PTA Visit  0    Time In:   12:45 PM   Time Out:   2:00 PM     Subjective      Pt reports:  I'm doing ok; Gardenia continues to note improvement in her back and legs; stated that her next goal is to be able to walk down the aisle at [employer] for communion; she is hesitant to do this now - feels unsure of her right leg.   Pain Scale: Gardenia rates pain on a scale of 0-10 to be 2-3  currently. .     Objective     Gardenia received individual therapeutic exercises to develop ROM, posture, core stabilization and centralization of pain from RLE to lower back  for 43  minutes including:    Nu-Step - 15 mins x 2 level 5    SKC x 10  DKC x 6  Lumbar rolls x 3 mins with towel between knees  Bridges x 10  Pelvic tilts x 10  Leg lengthening with arm lengthening x 10  S/L hip abduction x 10  Clams / Reverse clams x 10   Prone - knee flexion x 10  Prone leg extension x 10 - too much pain with alternate bob and leg - back to just leg   Prone Lumbar Ext x 10  MIA x 3mins     Recumbent Bike x 10- mins after exercise         Gardenia received the following manual therapy techniques: Joint mobilizations and Soft tissue Mobilization were applied to the: lumbar spine  for 15 minutes including:     RLE/LLE - hamstring stretching; In side lying - right piriformis STM, RLE into hip extension - lumbar extension. Prone - lumbar side bending on right side. Paraspinal skin rolling.      The patient received the following direct contact modalities after being cleared for contraindications:     DNP heat/ice today   The patient received the following supervised modalities after being cleared for contradictions:  Moist heat to lumbar spine  x 15 mins in 90/90 position followed by ice x 15 mins in same position     Written Home Exercises Provided:   Gave Gardenia a written HEP with all of the exercises noted above   Pt demo fair understanding of the education provided. Gardenia demonstrated fair return demonstration of activities.     Education provided re:  Gardenia verbalized fair understanding of education provided.   No spiritual or educational barriers to learning provided    Assessment     Gardenia tolerated treatment well. She is still unsure of the strength in her right leg - wants to start riding the bike to strengthen her legs; Gardenia is progressing very well, slowly getting back to more of her usual daily activities.  She still has some issues while sleeping cannot seem to find a comfortable position in order to sleep most of the night.  She is able to ambulate without having to use an AD. Exercises keeping leg pain in check for the most part.   This is a 79 y.o. female referred to outpatient physical therapy and presents with a medical diagnosis of lumbar radiculopathy  and demonstrates limitations as described in the problem list. Pt prognosis is Good. Pt will continue to benefit from skilled outpatient physical therapy to address the deficits listed in the problem list, provide pt/family education and to maximize pt's level of independence in the home and community environment.     Goals as follows:Long Term Goals: 6 weeks - all goals remain appropriate.      Pain: Decrease pain to no more than 2 /10 to allow for return to full participation in daily and recreational activities   Strength: Improve strength in core muscles to 4/5 for improved lumbopelvic stability  ROM: Improve ROM to 75% of normal limits   Functional scale: Improve score on Oswestry to <30% limitation   Lifting: Lift 10 lbs to waist level, 5 lbs to shoulder level, 5 lbs to overhead without pain or compensation  Walking: Increase walking distance and/or duration to 30 mins without  pain   Postures: Increase sitting and/or standing duration to 30 mins without pain   Transfers: Perform sit <> stand and supine <> sit  transfers without increased pain or limitation  Exercise: demonstrate independence with home exercise program to maintain gains made in therapy.            Plan     Continue with established Plan of Care towards PT goals.    Therapist: Ramona Ron, PT  1/1/2020

## 2020-01-03 ENCOUNTER — CLINICAL SUPPORT (OUTPATIENT)
Dept: REHABILITATION | Facility: HOSPITAL | Age: 80
End: 2020-01-03
Payer: MEDICARE

## 2020-01-03 DIAGNOSIS — M54.41 ACUTE RIGHT-SIDED LOW BACK PAIN WITH RIGHT-SIDED SCIATICA: Primary | ICD-10-CM

## 2020-01-03 PROCEDURE — 97535 SELF CARE MNGMENT TRAINING: CPT | Mod: PN

## 2020-01-03 PROCEDURE — 97110 THERAPEUTIC EXERCISES: CPT | Mod: PN

## 2020-01-03 NOTE — PROGRESS NOTES
Physical Therapy Daily Note     Name: Gardenia ROWAN [facility] Number: [id number]  Diagnosis:   Encounter Diagnosis   Name Primary?    Acute right-sided low back pain with right-sided sciatica Yes     Physician:  Dr. Garcia   Precautions: standard  Visit #:  12 of 12   PTA Visit  0    Time In:    1:30 PM   Time Out:   2:45 PM     Subjective      Pt reports:  I achieved my goals yesterday - I got into the  Car without having to lift my leg and I walked down the aisle to receive  Communion without problems.  Last visit on POC today; Gardenia wants to take a break from therapy and see how she does on her own.   Pain Scale: Gardenia rates pain on a scale of 0-10 to be 2  currently. .     Objective     Gardenia received individual therapeutic exercises to develop ROM, posture, core stabilization and centralization of pain from RLE to lower back  for 43  minutes including:    Nu-Step - 15 mins x 2 level 5    SKC x 10  DKC x 6  Lumbar rolls x 3 mins with towel between knees  Bridges x 10  Pelvic tilts x 10  Leg lengthening with arm lengthening x 10  S/L hip abduction x 10  Clams / Reverse clams x 10   Prone - knee flexion x 10  Prone leg extension x 10 - too much pain with alternate bob and leg - back to just leg   Prone Lumbar Ext x 10  MIA x 3mins     Recumbent Bike x 10- mins after exercise         Gardenia received the following manual therapy techniques: Joint mobilizations and Soft tissue Mobilization were applied to the: lumbar spine  for 15 minutes including:     RLE/LLE - hamstring stretching; In side lying - right piriformis STM, RLE into hip extension - lumbar extension. Prone - lumbar side bending on right side. Paraspinal skin rolling.      The patient received the following direct contact modalities after being cleared for contraindications:     DNP heat/ice today   The patient received the following supervised modalities after being cleared for contradictions:   Moist heat to lumbar spine x 15 mins in 90/90 position followed by ice x 15 mins in same position     Written Home Exercises Provided:   Gave Gardenia a written HEP with all of the exercises noted above   Pt demo fair understanding of the education provided. Gardenia demonstrated fair return demonstration of activities.     Education provided re:  Gardenia verbalized fair understanding of education provided.   No spiritual or educational barriers to learning provided    Assessment     Gardenia tolerated treatment well. She is still unsure of the strength in her right leg - wants to start riding the bike to strengthen her legs; Gardenia is progressing very well, slowly getting back to more of her usual daily activities.  She still has some issues while sleeping cannot seem to find a comfortable position in order to sleep most of the night.  She is able to ambulate without having to use an AD. Exercises keeping leg pain in check for the most part  This is a 79 y.o. female referred to outpatient physical therapy and presents with a medical diagnosis of lumbar radiculopathy  and demonstrates limitations as described in the problem list. Pt prognosis is Good. Gardenia has reached a point where she has become independnet with her present HEP; she is not interested in increasing the level of her activity at this time.  She is going to start riding her bike at home now on a regular basis. She is aware of need for improved body mechanics with her daily activities and feels good about stopping her therapy at this time.       Goals as follows:Long Term Goals: 6 weeks - all goals remain appropriate.      Pain: Decrease pain to no more than 2 /10 to allow for return to full participation in daily and recreational activities   Strength: Improve strength in core muscles to 4/5 for improved lumbopelvic stability  ROM: Improve ROM to 75% of normal limits   Functional scale: Improve score on Oswestry to <30% limitation   Lifting: Lift 10 lbs to  waist level, 5 lbs to shoulder level, 5 lbs to overhead without pain or compensation  Walking: Increase walking distance and/or duration to 30 mins without pain   Postures: Increase sitting and/or standing duration to 30 mins without pain   Transfers: Perform sit <> stand and supine <> sit  transfers without increased pain or limitation  Exercise: demonstrate independence with home exercise program to maintain gains made in therapy.            Plan     Discharge from Physical Therapy at this time.  Gardenia will continue with HEP and contact me if further issues arise.     Therapist: Ramona Ron, PT  1/3/2020

## 2020-06-30 ENCOUNTER — CLINICAL SUPPORT (OUTPATIENT)
Dept: REHABILITATION | Facility: HOSPITAL | Age: 80
End: 2020-06-30
Payer: MEDICARE

## 2020-06-30 DIAGNOSIS — M25.512 ACUTE PAIN OF LEFT SHOULDER: Primary | ICD-10-CM

## 2020-06-30 PROCEDURE — 97161 PT EVAL LOW COMPLEX 20 MIN: CPT | Mod: PN

## 2020-06-30 NOTE — PLAN OF CARE
"[facility]  Physical Therapy Initial Evaluation    Name: Gardenia Dent  Clinic Number: [id number]    Therapy Diagnosis:   Encounter Diagnosis   Name Primary?    Acute pain of left shoulder Yes     Physician: Lida Hudson    Physician Orders: PT Eval and Treat   Medical Diagnosis: Left shoulder pain   Evaluation Date: 6/30/2020  Authorization period Expiration: 12/31/2020  Plan of Care Certification Period: 9/30/2020    Visit #: 1/ Visits authorized: 18  Time In:1:10 pm   Time Out: 2:15 pm   Total Billable Time: 60 minutes    Precautions: Standard    Subjective   Date of onset: about 1 month ago   Date of Surgery: n/a     No past medical history on file.  Gardenia Dent  has a past surgical history that includes Hysterectomy; Total knee arthroplasty; Wrist surgery; Hand surgery; Hip surgery; and Sinus surgery.    Gardenia has a current medication list which includes the following prescription(s): alprazolam, ascorbic acid, bromelains, calcium-vitamin d3, cyclobenzaprine, diclofenac sodium, duloxetine, fenofibrate, gabapentin, murtaza (zingiber officinalis), hydrocodone-acetaminophen, ibuprofen, levothyroxine, loratadine-pseudoephedrine 5-120 mg, methocarbamol, methylprednisolone, multivit with minerals/lutein, pantoprazole, rosuvastatin, tamsulosin, tramadol, and turmeric (bulk).    Review of patient's allergies indicates:   Allergen Reactions    Sulfa (sulfonamide antibiotics)         Imaging, : xray of left shoulder - patient indicated that there was no fractures;     Prior Therapy: Gardenia has been here in the past for treatment of lower back, bilateral knee problems   Occupation: retired   Prior Level of Function: Independent   Current Level of Function: Independent - but reports 3 falls in past month(?) all have been related to her feet getting "tangled up" or tripping over a change in surface height. Gardenia also reports that she has become progressively more sedentary in the " past year; she has a recumbent bike at home - does not use it anymore; she also has inversion table - stated that when she can get it adjusted correctly, it really helps her back. - but she doesn't use it very often.     Pain:  Current 2/10, worst 5/10, best 0/10   Location: shoulder  left  Description: Aching and Throbbing  Aggravating Factors: lifting arm up and putting it behind her head and behind her back.   Easing Factors: ice and rest      Onset/KEKE: sudden    History of current condition - Gardenia reports: She fell about a month ago landing hard on her left shoulder; she did not reach out to catch herself, so assume she fell directly on the lateral aspect of her shoulder.  Gardenia stated that it has improved significantly since her fall; She was referred to Dr. Warren's office and saw the NP - put on methylcarbinol and alternate ice/heat to shoulder which she reports was very helpful.  She still notes pain with certain movement, but again reports that it is getting better.   Gardenia indicated that she has fallen 3 times in the past month - explained to her that it's possible her sciatica history in RLE has affected her right foot, also increased sedentary lifestyle adds to decreased balance and ability to self correct LOB episodes.     Pts goals: To restore full movement to her left shoulder         Objective     Observation: Gardenia is independently ambulatory; she reports ability to use her arms for ADL's;     Posture:     -       Rounded shoulders  -       Forward head    Shoulder Range of Motion:   Left active Left Passive Right active  Right Passive   Flexion 158 160 160 165   Abduction 155 158 160 163   Extension 40 43 45 45   Ext. Rotation HBH to C7- decreased quality of movement with pain  75 @ 90  78 @ 0 HBH to T2 WNL   Int. Rotation Hand to spine - decreased quality of movement with pain  75 TUB to T9 WNL          Upper Extremity Strength  (R) UE  (L) UE    Shoulder flexion: 5/5 Shoulder flexion: 4/5    Shoulder Abduction: 4+/5 Shoulder abduction: 4-/5   Shoulder ER 5/5 Shoulder ER 4+/5   Shoulder IR 5/5 Shoulder IR 4+/5   Lower Trap 3+/5 Lower Trap 3+/5   Middle Trap 3+/5 Middle Trap 3+/5   Rhomboids 3+/5 Rhomboids 3+/5     Special Tests:   Left Right   Nisha Irwin Negative Negative   Painful arc Negative Negative   Drop Arm test Negative Negative   Subscaputlaris Lift Off Negative Negative   Clunk test Negative Negative   Cross body adduction Positive Negative   Chippewa's Negative Negative   AC resisted Extension Negative Negative   Yergason's  Negative Negative   Anterior Apprehension test Negative Negative   Relocation test Negative Negative   Posterior Apprehension test Negative Negative   Infraspinatus MMT (prone) Negative Negative     Joint mobility: restricted    Palpation:moderate tenderness to palpation at posterior deltoid, upper trap/supraspinatous     Sensation: intact to light touch     Flexibility:     Upper Trap = R no restriction, L minimal restriction   Scalenes: R no restriction, L minimal restriction   SCM: R no restriction, L no restriction   Levator Scap: R no restriction, L minimal restriction    Scapular Control/Dyskinesis:    Normal / Subtle / Obvious  Comments    Left  Normal  n/a    Right  Normal  N/a        PT Evaluation Completed? Yes  Discussed Plan of Care with patient: Yes      Home Exercises and Patient Education Provided    Education provided re:   - progress towards goals   - role of therapy in multi - disciplinary team, goals for therapy  Pt educated on condition, POC, and expectations in therapy.  No spiritual or educational barriers to learning provided    Home exercises:  Pt will be provided HEP during course of treatment with progressions as appropriate. Pt was advised to perform these exercises free of pain, and to stop performing them if pain occurs.   Gardenia demonstrated good  understanding of the education provided.       Functional Limitations Reports - G  Codes  Category: Mobility, Body position, Carrying, Self care, Other  Tool: DASH   Score: 86% impaired    Assessment   Gardenia is a 80 y.o. female referred to outpatient physical therapy and presents to PT with Left shoulder pain as result of a fall . Patient reports that she pain and movement are improving since incident, but she still notes some decreased range and pain with behind the back and head movements. Gardenia demonstrates limitations as described in the problem list. Pt will benefit from physcial therapy services in order to maximize pain free and/or functional use of left shoulder . The following goals were discussed with the patient and patient is in agreement with them as to be addressed in the treatment plan.   Pt prognosis is Good.   Pt will benefit from skilled outpatient Physical Therapy to address the deficits stated above and in the chart below, provide pt/family education, and to maximize pt's level of independence.     Plan of care discussed with patient: Yes  Pt's spiritual, cultural and educational needs considered and pt agreeable to plan of care and goals as stated below:     Anticipated Barriers for therapy: none    Medical necessity is demonstrated by the following IMPAIRMENTS/PROBLEM LIST:    weakness, impaired functional mobility, decreased upper extremity function, pain, decreased ROM and impaired joint extensibility    GOALS:     Long Term Goals: 6-8 weeks  Pain: Decrease pain to 0/10 to allow for improved ability to perform daily activities   Strength: Improve strength in left shoulder and periscapular muscles to at least 4+/5 for improved shoulder stability  ROM: Improve ROM to 90% of normal limits   Functional scale: Improve score on DASH to < 60% impairment  Lifting: Lift 10 lbs to waist level, 4 lbs to shoulder level, 4 lbs to overhead without pain or compensation  Walking: Increase walking distance/duration to 30 mins  without pain  Postures: Increase sitting and/or standing  duration to 60 mins  without pain   Transfers: Perform all  transfers without increased pain or limitation  Exercise: demonstrate independence with home exercise program to maintain gains made in therapy.          Plan   Certification Period: 6/30/2020 to 9/30/2020 .    Outpatient physical therapy 2 times weekly to include: Manual Therapy, Moist Heat/ Ice, Neuromuscular Re-ed, Patient Education and Therapeutic Exercise. Cont PT for 6-8 weeks.   Pt may be seen by PTA as part of the rehabilitation team.     I certify the need for these services furnished under this plan of treatment and while under my care.    Ramona Ron, PT          Attestation:   I have seen the patient, reviewed the therapist's plan of care, and I agree with the plan of care.   I certify the need for these services furnished under this plan of treatment and while under my care.         _______________            ________                                               _____________________  Physician/Referring Practitioner                                                            Date of Signature

## 2020-07-01 ENCOUNTER — CLINICAL SUPPORT (OUTPATIENT)
Dept: REHABILITATION | Facility: HOSPITAL | Age: 80
End: 2020-07-01
Payer: MEDICARE

## 2020-07-01 DIAGNOSIS — M25.512 ACUTE PAIN OF LEFT SHOULDER: Primary | ICD-10-CM

## 2020-07-01 PROCEDURE — 97110 THERAPEUTIC EXERCISES: CPT | Mod: PN

## 2020-07-01 PROCEDURE — 97140 MANUAL THERAPY 1/> REGIONS: CPT | Mod: PN

## 2020-07-01 NOTE — PROGRESS NOTES
Physical Therapy Daily Note     Name: Gardenia ROWAN [facility] Number: [id number]  Diagnosis:   Encounter Diagnosis   Name Primary?    Acute pain of left shoulder Yes     Physician: Lida Hudson  Precautions: standard, fall   Visit #: 2 of 18  PTA Visit #: 0  Time In: 1:45 pm   Time Out: 2:40 pm     Subjective     Pt reports: My neck and upper traps on the left are bothering me a little; My shoulder is doing ok   Pain Scale: Gardenia rates pain on a scale of 0-10 to be 5 currently.    Objective     Gardenia received individual therapeutic exercises to develop ROM, posture and core stabilization for 30 minutes including:  Serratus Lifts: 2# x 15  Supine PNF - D2 with yellow TB  S/L external rotation - 1# x 15  Scapular retraction - red tband x 20  Shoulder extension - red tband x 20  Shoulder adduction - red tband x 20  Shoulder IR/ER - red tband x 15 each     Gardenia received the following manual therapy techniques: Joint mobilizations and Soft tissue Mobilization were applied to the: left shoulder and cervical region  for 162 minutes including:  Left shoulder GH joint mobilizations - grade II in all planes; Cervical mobilization - retraction, lateral gliding into both right/left rotation/SB. Thoracic spine extension - upper segments       The patient received the following direct contact modalities after being cleared for contraindications:     The patient received the following supervised modalities after being cleared for contradictions:     Written Home Exercises Provided:   Gardenia was given a written HEP with exercises above as well as theraband   Pt demo good understanding of the education provided. Gardenia demonstrated good return demonstration of activities.     Education provided re:  Gardenia verbalized good understanding of education provided.   No spiritual or educational barriers to learning provided    Assessment     Patient tolerated treatment well.  She  is noting improved left shoulder ROM; still sore under axilla, but getting slowly better.   This is a 80 y.o. female referred to outpatient physical therapy and presents with a medical diagnosis of left shoulder pain  and demonstrates limitations as described in the problem list. Pt prognosis is Good. Pt will continue to benefit from skilled outpatient physical therapy to address the deficits listed in the problem list, provide pt/family education and to maximize pt's level of independence in the home and community environment.     Goals as follows:    Long Term Goals: 6-8 weeks  Pain: Decrease pain to 0/10 to allow for improved ability to perform daily activities   Strength: Improve strength in left shoulder and periscapular muscles to at least 4+/5 for improved shoulder stability  ROM: Improve ROM to 90% of normal limits   Functional scale: Improve score on DASH to   Lifting: Lift 10 lbs to waist level, 4 lbs to shoulder level, 4 lbs to overhead without pain or compensation  Walking: Increase walking distance/duration to 30 mins  without pain  Postures: Increase sitting and/or standing duration to 60 mins  without pain   Transfers: Perform all  transfers without increased pain or limitation  Exercise: demonstrate independence with home exercise program to maintain gains made in therapy.          Plan     Continue with established Plan of Care towards PT goals.    Therapist: Ramona Ron, PT  7/1/2020

## 2020-07-07 ENCOUNTER — CLINICAL SUPPORT (OUTPATIENT)
Dept: REHABILITATION | Facility: HOSPITAL | Age: 80
End: 2020-07-07
Payer: MEDICARE

## 2020-07-07 DIAGNOSIS — M25.512 ACUTE PAIN OF LEFT SHOULDER: ICD-10-CM

## 2020-07-07 DIAGNOSIS — M54.41 ACUTE RIGHT-SIDED LOW BACK PAIN WITH RIGHT-SIDED SCIATICA: Primary | ICD-10-CM

## 2020-07-07 PROCEDURE — 97140 MANUAL THERAPY 1/> REGIONS: CPT | Mod: PN

## 2020-07-07 PROCEDURE — 97110 THERAPEUTIC EXERCISES: CPT | Mod: PN

## 2020-07-07 NOTE — PROGRESS NOTES
"                                                    Physical Therapy Daily Note     Name: Gardenia ROWAN [facility] Number: [id number]  Diagnosis:   Encounter Diagnoses   Name Primary?    Acute right-sided low back pain with right-sided sciatica Yes    Acute pain of left shoulder      Physician: Lida Hudson  Precautions: standard, fall   Visit #: 3 of 18  PTA Visit #: 0  Time In: 1:00 pm   Time Out: 2:20 pm     Subjective     Pt reports:  "I'm just not doing well at all, in fact we both aren't doing well" Gardenia stated that her arm has really been hurting, she just struggled to even get her hair washed this morning, stated that it is just so sore. Gardenia and her  have both been battling some pain issues - they are just really having a rough time taking care of each other right now.   Pain Scale: Gardenia rates pain on a scale of 0-10 to be 5 currently.    Objective     Gardenia received individual therapeutic exercises to develop ROM, posture and core stabilization for 30 minutes including:  Supine - shoulder flexion with UE ranger   Wall Slides - both UE's   Scapular Retractions: black tband - middle level x 20  Serratus Lifts: x 15  Supine PNF - D2 with yellow TB  S/L external rotation - 10 x   Shoulder extension - red tband x 20  Shoulder adduction - red tband x 20       Gardenia received the following manual therapy techniques: Joint mobilizations and Soft tissue Mobilization were applied to the: left shoulder and cervical region  for 20 miinutes including:  Left shoulder GH joint mobilizations - grade II in all planes; Scapular mobilization in S/L with lateral gliding as well as PNF pattern movements; STM to entire LUE from elbow to shoulder area to address multiple fascia restrictions; Cervical mobilization - retraction, lateral gliding into both right/left rotation/SB.       The patient received the following direct contact modalities after being cleared for contraindications:     The patient received " the following supervised modalities after being cleared for contradictions: Moist Heat to left shoulder following above x 15 mins     Written Home Exercises Provided:   Gardenia was given a written HEP with exercises above as well as theraband   Pt demo good understanding of the education provided. Gardenia demonstrated good return demonstration of activities.     Education provided re:  Gardenia verbalized good understanding of education provided.   No spiritual or educational barriers to learning provided    Assessment     Patient tolerated treatment well. Advised Gardenia that she needs to exercise her arm everyday to prevent frozen shoulder issues; she was doing well last week as far as ROM and pain - multiple rainy days later, she is c/o increased pain and an inability to do much at home - she is able to raise her arm above her head, can reach behind her head as well as get her hand behind her back - had to be reminded of this.   This is a 80 y.o. female referred to outpatient physical therapy and presents with a medical diagnosis of left shoulder pain  and demonstrates limitations as described in the problem list. Pt prognosis is Good. Pt will continue to benefit from skilled outpatient physical therapy to address the deficits listed in the problem list, provide pt/family education and to maximize pt's level of independence in the home and community environment.     Goals as follows:    Long Term Goals: 6-8 weeks  Pain: Decrease pain to 0/10 to allow for improved ability to perform daily activities   Strength: Improve strength in left shoulder and periscapular muscles to at least 4+/5 for improved shoulder stability  ROM: Improve ROM to 90% of normal limits   Functional scale: Improve score on DASH to   Lifting: Lift 10 lbs to waist level, 4 lbs to shoulder level, 4 lbs to overhead without pain or compensation  Walking: Increase walking distance/duration to 30 mins  without pain  Postures: Increase sitting and/or  standing duration to 60 mins  without pain   Transfers: Perform all  transfers without increased pain or limitation  Exercise: demonstrate independence with home exercise program to maintain gains made in therapy.          Plan     Continue with established Plan of Care towards PT goals.    Therapist: Ramona Ron, PT  7/7/2020

## 2020-07-10 ENCOUNTER — CLINICAL SUPPORT (OUTPATIENT)
Dept: REHABILITATION | Facility: HOSPITAL | Age: 80
End: 2020-07-10
Payer: MEDICARE

## 2020-07-10 DIAGNOSIS — M25.512 ACUTE PAIN OF LEFT SHOULDER: Primary | ICD-10-CM

## 2020-07-10 PROCEDURE — 97110 THERAPEUTIC EXERCISES: CPT | Mod: PN

## 2020-07-10 PROCEDURE — 97140 MANUAL THERAPY 1/> REGIONS: CPT | Mod: PN

## 2020-07-10 NOTE — PROGRESS NOTES
Physical Therapy Daily Note     Name: Gardenia ROWAN [facility] Number: [id number]  Diagnosis:   Encounter Diagnosis   Name Primary?    Acute pain of left shoulder Yes     Physician: Lida Hudson  Precautions: standard, fall   Visit #: 4 of 18  PTA Visit #: 0  Time In: 1:45 pm   Time Out:  2:30 pm     Subjective     Pt reports:  My arm is feeling better today;  I've been doing my exercises.   Pain Scale: Gardenia rates pain on a scale of 0-10 to be 5 currently.    Objective     Gardenia received individual therapeutic exercises to develop ROM, posture and core stabilization for 30 minutes including:  Supine - shoulder flexion with UE ranger   Wall Slides - both UE's   Scapular Retractions: black tband - middle level x 20  Serratus Lifts: x 15  Supine PNF - D2 with yellow TB  S/L external rotation - 10 x 2 lbs   S/L shoulder abduction - 2lbs x 10   Shoulder extension - red tband x 20  Shoulder adduction - red tband x 20  Deltoid walk-outs - yellow tband x 10        Gardenia received the following manual therapy techniques: Joint mobilizations and Soft tissue Mobilization were applied to the: left shoulder and cervical region  for 20 miinutes including:  Left shoulder GH joint mobilizations - grade II in all planes; Scapular mobilization in S/L with lateral gliding as well as PNF pattern movements; STM to entire LUE from elbow to shoulder area to address multiple fascia restrictions; Cervical mobilization - retraction, lateral gliding into both right/left rotation/SB.       The patient received the following direct contact modalities after being cleared for contraindications:     The patient received the following supervised modalities after being cleared for contradictions: Moist Heat to left shoulder following above x 15 mins     Written Home Exercises Provided:   Gardenia was given a written HEP with exercises above as well as theraband   Pt demo good understanding of  the education provided. Gardenia demonstrated good return demonstration of activities.     Education provided re:  Gardenia verbalized good understanding of education provided.   No spiritual or educational barriers to learning provided    Assessment     Patient tolerated treatment well. Advised Gardenia that she needs to exercise her arm everyday to prevent frozen shoulder issues; she was doing well last week as far as ROM and pain - multiple rainy days later, she is c/o increased pain and an inability to do much at home - she is able to raise her arm above her head, can reach behind her head as well as get her hand behind her back - had to be reminded of this.   This is a 80 y.o. female referred to outpatient physical therapy and presents with a medical diagnosis of left shoulder pain  and demonstrates limitations as described in the problem list. Pt prognosis is Good. Pt will continue to benefit from skilled outpatient physical therapy to address the deficits listed in the problem list, provide pt/family education and to maximize pt's level of independence in the home and community environment.     Goals as follows:    Long Term Goals: 6-8 weeks  Pain: Decrease pain to 0/10 to allow for improved ability to perform daily activities   Strength: Improve strength in left shoulder and periscapular muscles to at least 4+/5 for improved shoulder stability  ROM: Improve ROM to 90% of normal limits   Functional scale: Improve score on DASH to   Lifting: Lift 10 lbs to waist level, 4 lbs to shoulder level, 4 lbs to overhead without pain or compensation  Walking: Increase walking distance/duration to 30 mins  without pain  Postures: Increase sitting and/or standing duration to 60 mins  without pain   Transfers: Perform all  transfers without increased pain or limitation  Exercise: demonstrate independence with home exercise program to maintain gains made in therapy.          Plan     Continue with established Plan of Care towards  PT goals.    Therapist: Ramona Ron, PT  7/10/2020

## 2020-07-14 ENCOUNTER — CLINICAL SUPPORT (OUTPATIENT)
Dept: REHABILITATION | Facility: HOSPITAL | Age: 80
End: 2020-07-14
Payer: MEDICARE

## 2020-07-14 DIAGNOSIS — M25.512 ACUTE PAIN OF LEFT SHOULDER: Primary | ICD-10-CM

## 2020-07-14 PROCEDURE — 97110 THERAPEUTIC EXERCISES: CPT | Mod: PN

## 2020-07-14 PROCEDURE — 97140 MANUAL THERAPY 1/> REGIONS: CPT | Mod: PN

## 2020-07-14 NOTE — PROGRESS NOTES
Physical Therapy Daily Note     Name: Gardenia ROWAN [facility] Number: [id number]  Diagnosis:   Encounter Diagnosis   Name Primary?    Acute pain of left shoulder Yes     Physician: Lida Hudson  Precautions: standard, fall   Visit #: 5 of 18  PTA Visit #: 0  Time In: 1:45 pm   Time Out:  2:30 pm     Subjective     Pt reports:  My arm is hurting some today, I think I slept on it wrong or just too long. Gardenia stated that she has been doing her exercises; stated that she is trying to be so careful of where and how she steps, doesn't want to fall again.   Pain Scale: Gardenia rates pain on a scale of 0-10 to be 5 currently.    Objective     Gardenia received individual therapeutic exercises to develop ROM, posture and core stabilization for 30 minutes including:  Supine - shoulder flexion with UE ranger   Wall Slides - both UE's   Scapular Retractions: black tband - middle level x 20  Serratus Lifts: x 15  Supine PNF - D2 with yellow TB  S/L external rotation - 10 x 2 lbs   S/L shoulder abduction - 2lbs x 10   Shoulder extension - red tband x 20  Shoulder adduction - red tband x 20  Deltoid walk-outs - yellow tband x 10     Toe-Taps while standing on Air-Ex - 2 risers on green step: Had Gardenia do this with no UE assist to work on balance; She had some difficulty with SLS on compliant surface - needed some UE assist at times.   SLS: demonstrated for Gardenia how to work on this at home as well - perform SLS on one foot, use toe of other for some support as needed; try holding for 15 sec and work up to one minute, Had more issues on Left than Right.     Also advised her to get back on her stationary bike at home - needs to work LE's.        Gardenia received the following manual therapy techniques: Joint mobilizations and Soft tissue Mobilization were applied to the: left shoulder and cervical region  for 20 miinutes including:  Left shoulder GH joint mobilizations - grade  II in all planes; Scapular mobilization in S/L with lateral gliding as well as PNF pattern movements; STM to entire LUE from elbow to shoulder area to address multiple fascia restrictions; Cervical mobilization - retraction, lateral gliding into both right/left rotation/SB.       The patient received the following direct contact modalities after being cleared for contraindications:     The patient received the following supervised modalities after being cleared for contradictions: Moist Heat to left shoulder following above x 15 mins     Written Home Exercises Provided:   Gardenia was given a written HEP with exercises above as well as theraband   Pt demo good understanding of the education provided. Gardenia demonstrated good return demonstration of activities.     Education provided re:  Gardenia verbalized good understanding of education provided.   No spiritual or educational barriers to learning provided    Assessment     Patient tolerated treatment well. Advised Gardenia that she needs to exercise her arm everyday to prevent frozen shoulder issues; - she is able to raise her arm above her head, can reach behind her head as well as get her hand behind her back - had to be reminded of this. Gardenia also needs to work on her general body strength; she has recent history of several falls, admits to becoming more sedentary in the past year .   This is a 80 y.o. female referred to outpatient physical therapy and presents with a medical diagnosis of left shoulder pain  and demonstrates limitations as described in the problem list. Pt prognosis is Good. Pt will continue to benefit from skilled outpatient physical therapy to address the deficits listed in the problem list, provide pt/family education and to maximize pt's level of independence in the home and community environment.     Goals as follows:    Long Term Goals: 6-8 weeks  Pain: Decrease pain to 0/10 to allow for improved ability to perform daily activities    Strength: Improve strength in left shoulder and periscapular muscles to at least 4+/5 for improved shoulder stability  ROM: Improve ROM to 90% of normal limits   Functional scale: Improve score on DASH to   Lifting: Lift 10 lbs to waist level, 4 lbs to shoulder level, 4 lbs to overhead without pain or compensation  Walking: Increase walking distance/duration to 30 mins  without pain  Postures: Increase sitting and/or standing duration to 60 mins  without pain   Transfers: Perform all  transfers without increased pain or limitation  Exercise: demonstrate independence with home exercise program to maintain gains made in therapy.          Plan     Continue with established Plan of Care towards PT goals.    Therapist: Ramona Ron, PT  7/14/2020

## 2020-07-16 ENCOUNTER — CLINICAL SUPPORT (OUTPATIENT)
Dept: REHABILITATION | Facility: HOSPITAL | Age: 80
End: 2020-07-16
Payer: MEDICARE

## 2020-07-16 DIAGNOSIS — M25.512 ACUTE PAIN OF LEFT SHOULDER: ICD-10-CM

## 2020-07-16 DIAGNOSIS — M54.41 ACUTE RIGHT-SIDED LOW BACK PAIN WITH RIGHT-SIDED SCIATICA: Primary | ICD-10-CM

## 2020-07-16 PROCEDURE — 97110 THERAPEUTIC EXERCISES: CPT | Mod: PN

## 2020-07-16 PROCEDURE — 97140 MANUAL THERAPY 1/> REGIONS: CPT | Mod: PN

## 2020-07-16 NOTE — PROGRESS NOTES
Physical Therapy Daily Note     Name: Gardenia ROWAN [place]  Clinic Number: [id number]  Diagnosis:   Encounter Diagnoses   Name Primary?    Acute right-sided low back pain with right-sided sciatica Yes    Acute pain of left shoulder      Physician: Lida Hudson  Precautions: standard, fall   Visit #: 5 of 18  PTA Visit #: 0  Time In: 1:45 pm   Time Out:  2:30 pm     Subjective     Pt reports:  My arm is ok today; it aches at times; Gardenia also noting some right LE pain last evening, stated that her leg just wanted to give out on her.  Again urged her to start some LE exercises at home  Pain Scale: Gardenia rates pain on a scale of 0-10 to be 5 currently.    Objective     Gardenia received individual therapeutic exercises to develop ROM, posture and core stabilization for 30 minutes including:    Supine - shoulder flexion with UE ranger x   Supine - Serratus lifts with UE [place] x 12  Wall Slides - both UE's   Scapular Retractions: black tband - middle level x 20  Serratus Lifts: x 15  Supine PNF - D2 with yellow TB  S/L external rotation - 10 x 2 lbs   S/L shoulder abduction - 2lbs x 10   Shoulder extension - red tband x 20  Shoulder adduction - red tband x 20  Deltoid walk-outs - red  tband x 10     Also advised her to get back on her stationary bike at home - needs to work LE's.        Gardenia received the following manual therapy techniques: Joint mobilizations and Soft tissue Mobilization were applied to the: left shoulder and cervical region  for 20 miinutes including:  Left shoulder GH joint mobilizations - grade II in all planes; Scapular mobilization in S/L with lateral gliding as well as PNF pattern movements; STM to entire LUE from elbow to shoulder area to address multiple fascia restrictions; Cervical mobilization - retraction, lateral gliding into both right/left rotation/SB.       The patient received the following direct contact modalities after being  cleared for contraindications:     The patient received the following supervised modalities after being cleared for contradictions: Moist Heat to left shoulder following above x 15 mins     Written Home Exercises Provided:   Gardenia was given a written HEP with exercises above as well as theraband   Pt demo good understanding of the education provided. Gardenia demonstrated good return demonstration of activities.     Education provided re:  Gardenia verbalized good understanding of education provided.   No spiritual or educational barriers to learning provided    Assessment     Patient tolerated treatment well. Advised Gardenia that she needs to exercise her arm everyday to prevent frozen shoulder issues; - she is able to raise her arm above her head, can reach behind her head as well as get her hand behind her back - had to be reminded of this. Gardenia also needs to work on her general body strength; she has recent history of several falls, admits to becoming more sedentary in the past year .   This is a 80 y.o. female referred to outpatient physical therapy and presents with a medical diagnosis of left shoulder pain  and demonstrates limitations as described in the problem list. Pt prognosis is Good. Pt will continue to benefit from skilled outpatient physical therapy to address the deficits listed in the problem list, provide pt/family education and to maximize pt's level of independence in the home and community environment.     Goals as follows:    Long Term Goals: 6-8 weeks  Pain: Decrease pain to 0/10 to allow for improved ability to perform daily activities   Strength: Improve strength in left shoulder and periscapular muscles to at least 4+/5 for improved shoulder stability  ROM: Improve ROM to 90% of normal limits   Functional scale: Improve score on DASH to   Lifting: Lift 10 lbs to waist level, 4 lbs to shoulder level, 4 lbs to overhead without pain or compensation  Walking: Increase walking distance/duration  to 30 mins  without pain  Postures: Increase sitting and/or standing duration to 60 mins  without pain   Transfers: Perform all  transfers without increased pain or limitation  Exercise: demonstrate independence with home exercise program to maintain gains made in therapy.          Plan     Continue with established Plan of Care towards PT goals.    Therapist: Ramona Ron, PT  7/16/2020

## 2020-07-23 ENCOUNTER — CLINICAL SUPPORT (OUTPATIENT)
Dept: REHABILITATION | Facility: HOSPITAL | Age: 80
End: 2020-07-23
Payer: MEDICARE

## 2020-07-23 DIAGNOSIS — M25.512 ACUTE PAIN OF LEFT SHOULDER: Primary | ICD-10-CM

## 2020-07-23 PROCEDURE — 97140 MANUAL THERAPY 1/> REGIONS: CPT | Mod: PN

## 2020-07-23 PROCEDURE — 97110 THERAPEUTIC EXERCISES: CPT | Mod: PN

## 2020-07-23 NOTE — PROGRESS NOTES
Physical Therapy Daily Note     Name: Gardenia ROWAN [facility] Number: [id number]  Diagnosis:   Encounter Diagnosis   Name Primary?    Acute pain of left shoulder Yes     Physician: Lida Hudson  Precautions: standard, fall   Visit #: 6 of 18  PTA Visit #: 0  Time In: 3:15 pm   Time Out: 4  pm     Subjective     Pt reports:  No change in shoulder pain since last visit.   Pain Scale: Gardenia rates pain on a scale of 0-10 to be 5 currently.    Objective     Gardenia received individual therapeutic exercises to develop ROM, posture and core stabilization for 30 minutes including:    Supine - shoulder flexion with UE ranger x   Supine - Serratus lifts with UE [place] x 12  Wall Slides - both UE's x20  Scapular Retractions: black tband - middle level x 20  Serratus Lifts: x 15  Supine PNF - D2 with yellow TB x 10  Supine PNF - D1 with yellow TB x 10  S/L external rotation - 10 x 2 lbs   S/L shoulder abduction - 2lbs x 10   Shoulder extension - red tband x 20  Shoulder adduction - red tband x 20  Deltoid walk-outs - red  tband x 10         Gardenia received the following manual therapy techniques: Joint mobilizations and Soft tissue Mobilization were applied to the: left shoulder for 12 miinutes including:  Left shoulder GH joint mobilizations - grade II in all planes; Scapular mobilization in S/L with lateral gliding as well as PNF pattern movements; STM to entire LUE from elbow to shoulder area to address multiple fascia restrictions; [DNP: Cervical mobilization - retraction, lateral gliding into both right/left rotation/SB. ]      The patient received the following direct contact modalities after being cleared for contraindications:     The patient received the following supervised modalities after being cleared for contradictions: Moist Heat to left shoulder following above x 15 mins     Written Home Exercises Provided:   Gardenia was given a written HEP with exercises  above as well as theraband   Pt demo good understanding of the education provided. Gardenia demonstrated good return demonstration of activities.     Education provided re:  Gardenia verbalized good understanding of education provided.   No spiritual or educational barriers to learning provided    Assessment     Patient tolerated treatment well. Patient admits she does not exercise like she should at home. L shoulder motion WFL and pain seems to be under control. Patient states she really enjoyed doing PNF patterns and felt they helped her shoulder.   This is a 80 y.o. female referred to outpatient physical therapy and presents with a medical diagnosis of left shoulder pain  and demonstrates limitations as described in the problem list. Pt prognosis is Good. Pt will continue to benefit from skilled outpatient physical therapy to address the deficits listed in the problem list, provide pt/family education and to maximize pt's level of independence in the home and community environment.     Goals as follows:    Long Term Goals: 6-8 weeks  Pain: Decrease pain to 0/10 to allow for improved ability to perform daily activities   Strength: Improve strength in left shoulder and periscapular muscles to at least 4+/5 for improved shoulder stability  ROM: Improve ROM to 90% of normal limits   Functional scale: Improve score on DASH to   Lifting: Lift 10 lbs to waist level, 4 lbs to shoulder level, 4 lbs to overhead without pain or compensation  Walking: Increase walking distance/duration to 30 mins  without pain  Postures: Increase sitting and/or standing duration to 60 mins  without pain   Transfers: Perform all  transfers without increased pain or limitation  Exercise: demonstrate independence with home exercise program to maintain gains made in therapy.          Plan     Continue with established Plan of Care towards PT goals.    Therapist: Suni Lazar, PT  7/23/2020

## 2020-07-28 ENCOUNTER — CLINICAL SUPPORT (OUTPATIENT)
Dept: REHABILITATION | Facility: HOSPITAL | Age: 80
End: 2020-07-28
Payer: MEDICARE

## 2020-07-28 NOTE — PROGRESS NOTES
Physical Therapy Daily Note     [facility] [id number]  Diagnosis:   No diagnosis found.  Physician: Lida Hudson  Precautions: standard, fall   Visit #: 6 of 18  PTA Visit #: 0  Time In: 3:30 pm   Time Out: 4:20  pm     Subjective     Pt reports:  No change in shoulder pain since last visit.   Pain Scale: Gardenia rates pain on a scale of 0-10 to be 4 currently with activity.    Objective     Gardenia received individual therapeutic exercises to develop ROM, posture and core stabilization for 26 minutes including:    Supine - shoulder flexion with UE ranger x 2 min  Supine - Serratus lifts with UE [place] x 2 min  Wall Slides - both UE's x20  Scapular Retractions: black tband - middle level x 20  Serratus Lifts: x 15  Supine PNF - D2 with yellow TB x 12  Supine PNF - D1 with yellow TB x 12  S/L external rotation  2 lbs x 12   S/L shoulder abduction - 2lbs x 12   Shoulder extension - red tband x 20  Shoulder adduction - red tband x 20  Deltoid walk-outs - red  tband x 10       Gardenia received the following manual therapy techniques: Joint mobilizations and Soft tissue Mobilization were applied to the: left shoulder for 12 miinutes including:  Left shoulder GH joint mobilizations - grade II in all planes; Scapular mobilization in S/L with lateral gliding as well as PNF pattern movements; STM to left deltoid, PM, and subscap to address multiple fascia restrictions; [DNP: Cervical mobilization - retraction, lateral gliding into both right/left rotation/SB. ]    The patient received the following supervised modalities after being cleared for contradictions: Moist Heat to left shoulder following above x 15 mins DNP    Written Home Exercises Provided:   Gardenia was given a written HEP with exercises above as well as theraband   Pt demo good understanding of the education provided. Gardenia demonstrated good return demonstration of activities.      Education provided re:  Gardenia verbalized good understanding of education provided.   No spiritual or educational barriers to learning provided    Assessment     Patient tolerated treatment well. Patient admits she does not exercise like she should at home. Left shoulder ROM WFL with no significant pain reported other than end range stretch. Continued ttp insertion of deltoid responding well to STM. Patient is progressing towards goals.   This is a 80 y.o. female referred to outpatient physical therapy and presents with a medical diagnosis of left shoulder pain  and demonstrates limitations as described in the problem list. Pt prognosis is Good. Pt will continue to benefit from skilled outpatient physical therapy to address the deficits listed in the problem list, provide pt/family education and to maximize pt's level of independence in the home and community environment.     Goals as follows:    Long Term Goals: 6-8 weeks  Pain: Decrease pain to 0/10 to allow for improved ability to perform daily activities   Strength: Improve strength in left shoulder and periscapular muscles to at least 4+/5 for improved shoulder stability  ROM: Improve ROM to 90% of normal limits   Functional scale: Improve score on DASH to   Lifting: Lift 10 lbs to waist level, 4 lbs to shoulder level, 4 lbs to overhead without pain or compensation  Walking: Increase walking distance/duration to 30 mins  without pain  Postures: Increase sitting and/or standing duration to 60 mins  without pain   Transfers: Perform all  transfers without increased pain or limitation  Exercise: demonstrate independence with home exercise program to maintain gains made in therapy.          Plan     Continue with established Plan of Care towards PT goals.    Therapist: Mendoza Goss, PT  7/28/2020

## 2020-08-04 ENCOUNTER — CLINICAL SUPPORT (OUTPATIENT)
Dept: REHABILITATION | Facility: HOSPITAL | Age: 80
End: 2020-08-04
Payer: MEDICARE

## 2020-08-04 DIAGNOSIS — M25.512 ACUTE PAIN OF LEFT SHOULDER: Primary | ICD-10-CM

## 2020-08-04 PROCEDURE — 97110 THERAPEUTIC EXERCISES: CPT | Mod: PN

## 2020-08-04 PROCEDURE — 97140 MANUAL THERAPY 1/> REGIONS: CPT | Mod: PN

## 2020-08-04 NOTE — PROGRESS NOTES
"                                                    Physical Therapy Daily Note     Name: Gardenia ROWAN [facility] Number: [id number]  Diagnosis:   Encounter Diagnosis   Name Primary?    Acute pain of left shoulder Yes     Physician: Lida Hudson  Precautions: standard, fall   Visit #: 7 of 18  PTA Visit #: 0  Time In:  10:15 am    Time Out:  11: 10 am     Subjective     Pt reports:  Patient stated that her arm is "still pretty sore"  "I really hurt my shoulder when I fell"   Pain Scale: Gardenia rates pain on a scale of 0-10 to be 4 currently with activity.    Objective     Gardenia received individual therapeutic exercises to develop ROM, posture and core stabilization for 26 minutes including:    Supine - shoulder flexion with UE r[place] x 2 min  Supine - Serratus lifts with UE [place] x 2 min  Wall Slides with lift off x 20  Scapular Retractions: black tband - middle level x 20  Serratus Lifts: x 15  Supine PNF - D2 with yellow TB x 12  Supine PNF - D1 with yellow TB x 12  S/L external rotation  2 lbs x 12   S/L shoulder abduction - 2lbs x 12   Shoulder extension - red tband x 20  Shoulder adduction - red tband x 20  Deltoid walk-outs - red  tband x 10       Gardenia received the following manual therapy techniques: Joint mobilizations and Soft tissue Mobilization were applied to the: left shoulder for 12 miinutes including:  Left shoulder GH joint mobilizations - grade II in all planes; Scapular mobilization in S/L with lateral gliding as well as PNF pattern movements; STM to left deltoid, PM, and subscap to address multiple fascia restrictions;     The patient received the following supervised modalities after being cleared for contradictions: Moist Heat to left shoulder following above x 15 mins DNP    Written Home Exercises Provided:   Gardenia was given a written HEP with exercises above as well as theraband   Pt demo good understanding of the education provided. Gardenia demonstrated good return demonstration of " activities.     Education provided re:  Gardenia verbalized good understanding of education provided.   No spiritual or educational barriers to learning provided    Assessment     Patient tolerated treatment well. Patient admits she does not exercise like she should at home. Left shoulder ROM WFL with no significant pain reported other than end range stretch. Continued ttp insertion of deltoid responding well to STM. Patient is progressing towards goals.   This is a 80 y.o. female referred to outpatient physical therapy and presents with a medical diagnosis of left shoulder pain  and demonstrates limitations as described in the problem list. Pt prognosis is Good. Pt will continue to benefit from skilled outpatient physical therapy to address the deficits listed in the problem list, provide pt/family education and to maximize pt's level of independence in the home and community environment.     Goals as follows:    Long Term Goals: 6-8 weeks  Pain: Decrease pain to 0/10 to allow for improved ability to perform daily activities   Strength: Improve strength in left shoulder and periscapular muscles to at least 4+/5 for improved shoulder stability  ROM: Improve ROM to 90% of normal limits   Functional scale: Improve score on DASH to   Lifting: Lift 10 lbs to waist level, 4 lbs to shoulder level, 4 lbs to overhead without pain or compensation  Walking: Increase walking distance/duration to 30 mins  without pain  Postures: Increase sitting and/or standing duration to 60 mins  without pain   Transfers: Perform all  transfers without increased pain or limitation  Exercise: demonstrate independence with home exercise program to maintain gains made in therapy.          Plan     Continue with established Plan of Care towards PT goals.    Therapist: Ramona Ron, PT  8/4/2020

## 2020-08-06 ENCOUNTER — CLINICAL SUPPORT (OUTPATIENT)
Dept: REHABILITATION | Facility: HOSPITAL | Age: 80
End: 2020-08-06
Payer: MEDICARE

## 2020-08-06 PROCEDURE — 97110 THERAPEUTIC EXERCISES: CPT | Mod: PN

## 2020-08-06 PROCEDURE — 97140 MANUAL THERAPY 1/> REGIONS: CPT | Mod: PN

## 2020-08-06 NOTE — PROGRESS NOTES
Physical Therapy Daily Note     Name: Gardenia ROWAN [facility] Number: [id number]  Diagnosis:   No diagnosis found.  Physician: Lida Hudson  Precautions: standard, fall   Visit #: 10 of 18  PTA Visit #: 0  Time In:  2:45 pm    Time Out:  3:38 pm     Subjective     Pt reports:  Patient stated that her arm feels like its getting a little better though still aches a lot at night.   Pain Scale: Gardenia rates pain on a scale of 0-10 to be 4 currently with activity.    Objective     Gardenia received individual therapeutic exercises to develop ROM, posture and core stabilization for 26 minutes including:    Supine - shoulder flexion with UE ranger x 2 min  Supine - Serratus lifts with UE [place] x 2 min  Wall Slides with lift off x 20  Scapular Retractions: black tband - middle level x 20  Supine PNF - D2 with yellow TB x 20  Supine PNF - D1 with yellow TB x 12  S/L external rotation  2 lbs x 20  S/L shoulder abduction - 2lbs x 20   Shoulder extension - red tband x 20  Shoulder adduction - red tband x 20  Deltoid walk-outs - red  tband x 10       Gardenia received the following manual therapy techniques: Joint mobilizations and Soft tissue Mobilization were applied to the: left shoulder for 12 miinutes including:  Left shoulder GH joint mobilizations - grade II in all planes; Scapular mobilization in S/L with lateral gliding as well as PNF pattern movements; STM to left deltoid, PM, and subscap to address multiple fascia restrictions;     The patient received the following supervised modalities after being cleared for contradictions: Moist Heat to left shoulder following above x 15 mins DNP    Written Home Exercises Provided:   Gardenia was given a written HEP with exercises above as well as theraband   Pt demo good understanding of the education provided. Gardenia demonstrated good return demonstration of activities.     Education provided re:  Gardenia verbalized good  understanding of education provided.   No spiritual or educational barriers to learning provided    Assessment     Patient tolerated treatment well. Patient admits she does not exercise like she should at home. Patient demonstrates functional left shoulder ROM with no significant pain reported other than end range stretch. Minimal ttp at deltoid insertion and moderate ttp in UT and subscap noted today. Patient is progressing towards goals.     This is a 80 y.o. female referred to outpatient physical therapy and presents with a medical diagnosis of left shoulder pain  and demonstrates limitations as described in the problem list. Pt prognosis is Good. Pt will continue to benefit from skilled outpatient physical therapy to address the deficits listed in the problem list, provide pt/family education and to maximize pt's level of independence in the home and community environment.     Goals as follows:    Long Term Goals: 6-8 weeks  Pain: Decrease pain to 0/10 to allow for improved ability to perform daily activities   Strength: Improve strength in left shoulder and periscapular muscles to at least 4+/5 for improved shoulder stability  ROM: Improve ROM to 90% of normal limits   Functional scale: Improve score on DASH to   Lifting: Lift 10 lbs to waist level, 4 lbs to shoulder level, 4 lbs to overhead without pain or compensation  Walking: Increase walking distance/duration to 30 mins  without pain  Postures: Increase sitting and/or standing duration to 60 mins  without pain   Transfers: Perform all  transfers without increased pain or limitation  Exercise: demonstrate independence with home exercise program to maintain gains made in therapy.          Plan     Continue with established Plan of Care towards PT goals.    Therapist: Mendoza Goss, PT  8/6/2020

## 2020-08-11 ENCOUNTER — CLINICAL SUPPORT (OUTPATIENT)
Dept: REHABILITATION | Facility: HOSPITAL | Age: 80
End: 2020-08-11
Payer: MEDICARE

## 2020-08-11 DIAGNOSIS — M25.512 ACUTE PAIN OF LEFT SHOULDER: Primary | ICD-10-CM

## 2020-08-11 PROCEDURE — 97140 MANUAL THERAPY 1/> REGIONS: CPT | Mod: PN

## 2020-08-11 PROCEDURE — 97110 THERAPEUTIC EXERCISES: CPT | Mod: PN

## 2020-08-11 NOTE — PROGRESS NOTES
Physical Therapy Daily Note     Name: Gardenia ROWAN [facility] Number: [id number]  Diagnosis:   Encounter Diagnosis   Name Primary?    Acute pain of left shoulder Yes     Physician: Lida Hudson  Precautions: standard, fall   Visit #: 11 of 18  PTA Visit #: 0  Time In:  2:00 pm    Time Out:  2:45 pm      Subjective     Pt reports:  Patient stated that her arm feels like its getting a little better though still aches a lot at night.   Pain Scale: Gardenia rates pain on a scale of 0-10 to be 4 currently with activity.    Objective     Gardenia received individual therapeutic exercises to develop ROM, posture and core stabilization for 26 minutes including:    Supine - shoulder flexion with UE ranger x 2 min  Supine - Serratus lifts with UE [place] x 2 min  Wall Slides with lift off x 20  Scapular Retractions: black tband - middle level x 20  Supine PNF - D2 with yellow TB x 20  Supine PNF - D1 with yellow TB x 12  S/L external rotation  2 lbs x 20  S/L shoulder abduction - 2lbs x 20   Shoulder extension - red tband x 20  Shoulder adduction - red tband x 20  Deltoid walk-outs - red  tband x 10       Gardenia received the following manual therapy techniques: Joint mobilizations and Soft tissue Mobilization were applied to the: left shoulder for 12 miinutes including:  Left shoulder GH joint mobilizations - grade II in all planes; Scapular mobilization in S/L with lateral gliding as well as PNF pattern movements; STM to left deltoid, PM, and subscap to address multiple fascia restrictions;     The patient received the following supervised modalities after being cleared for contradictions: Moist Heat to left shoulder following above x 15 mins DNP    Written Home Exercises Provided:   Gardenia was given a written HEP with exercises above as well as theraband   Pt demo good understanding of the education provided. Gardenia demonstrated good return demonstration of activities.      Education provided re:  Gardenia verbalized good understanding of education provided.   No spiritual or educational barriers to learning provided    Assessment     Patient tolerated treatment well. Patient admits she does not exercise like she should at home. Patient demonstrates functional left shoulder ROM with no significant pain reported other than end range stretch. Minimal ttp at deltoid insertion and moderate ttp in UT and subscap noted today. Patient is progressing towards goals.     This is a 80 y.o. female referred to outpatient physical therapy and presents with a medical diagnosis of left shoulder pain  and demonstrates limitations as described in the problem list. Pt prognosis is Good. Pt will continue to benefit from skilled outpatient physical therapy to address the deficits listed in the problem list, provide pt/family education and to maximize pt's level of independence in the home and community environment.     Goals as follows:    Long Term Goals: 6-8 weeks  Pain: Decrease pain to 0/10 to allow for improved ability to perform daily activities   Strength: Improve strength in left shoulder and periscapular muscles to at least 4+/5 for improved shoulder stability  ROM: Improve ROM to 90% of normal limits   Functional scale: Improve score on DASH to   Lifting: Lift 10 lbs to waist level, 4 lbs to shoulder level, 4 lbs to overhead without pain or compensation  Walking: Increase walking distance/duration to 30 mins  without pain  Postures: Increase sitting and/or standing duration to 60 mins  without pain   Transfers: Perform all  transfers without increased pain or limitation  Exercise: demonstrate independence with home exercise program to maintain gains made in therapy.          Plan     Continue with established Plan of Care towards PT goals.    Therapist: Ramona Ron, PT  8/11/2020

## 2020-08-13 ENCOUNTER — CLINICAL SUPPORT (OUTPATIENT)
Dept: REHABILITATION | Facility: HOSPITAL | Age: 80
End: 2020-08-13
Payer: MEDICARE

## 2020-08-13 DIAGNOSIS — M25.512 ACUTE PAIN OF LEFT SHOULDER: Primary | ICD-10-CM

## 2020-08-13 PROCEDURE — 97110 THERAPEUTIC EXERCISES: CPT | Mod: PN

## 2020-08-13 PROCEDURE — 97140 MANUAL THERAPY 1/> REGIONS: CPT | Mod: PN

## 2020-08-13 NOTE — PROGRESS NOTES
Physical Therapy Daily Note     Name: Gardenia ROWAN [facility] Number: [id number]  Diagnosis:   Encounter Diagnosis   Name Primary?    Acute pain of left shoulder Yes     Physician: Lida Hudson  Precautions: standard, fall   Visit #: 12 of 18  PTA Visit #: 0  Time In:  10:05 am    Time Out:  10: 50 am        Subjective     Pt reports:  Gardenia reports that her arm continues to hurt during the day; limits with activities that require reaching up;   Pain Scale: Gardenia rates pain on a scale of 0-10 to be 4 currently with activity. Patient reports trouble reaching up into cabinets - aching at night across shoulder     Objective     Gardenia received individual therapeutic exercises to develop ROM, posture and core stabilization for 26 minutes including:    Supine - shoulder flexion with UE [place] x 2 min  Supine - Serratus lifts with UE [place] x 2 min  Wall Slides with lift off x 20  Scapular Retractions: black tband - middle level x 20  Supine PNF - D2 with yellow TB x 20  Supine PNF - D1 with yellow TB x 12  S/L external rotation  2 lbs x 20  S/L shoulder abduction - 2lbs x 20   Shoulder extension - red tband x 20  Shoulder adduction - red tband x 20  Deltoid walk-outs - red  tband x 10       Gardenia received the following manual therapy techniques: Joint mobilizations and Soft tissue Mobilization were applied to the: left shoulder for 12 miinutes including:  Left shoulder GH joint mobilizations - grade II in all planes; Scapular mobilization in S/L with lateral gliding as well as PNF pattern movements; STM to left deltoid, PM, and subscap to address multiple fascia restrictions; Passive range of motion through all planes.     The patient received the following supervised modalities after being cleared for contradictions:     Written Home Exercises Provided:   Gardenia was given a written HEP with exercises above as well as theraband   Pt demo good understanding of  the education provided. Gardenia demonstrated good return demonstration of activities.     Education provided re:  Gardenia verbalized good understanding of education provided.   No spiritual or educational barriers to learning provided    Assessment     Patient tolerated treatment well. Patient admits she does not exercise like she should at home. Patient demonstrates functional left shoulder ROM with no significant pain reported other than end range stretch. Minimal ttp at deltoid insertion and moderate ttp in UT and subscap noted today. Patient is progressing towards goals.     This is a 80 y.o. female referred to outpatient physical therapy and presents with a medical diagnosis of left shoulder pain  and demonstrates limitations as described in the problem list. Pt prognosis is Good. Pt will continue to benefit from skilled outpatient physical therapy to address the deficits listed in the problem list, provide pt/family education and to maximize pt's level of independence in the home and community environment.     Goals as follows:    Long Term Goals: 6-8 weeks  Pain: Decrease pain to 0/10 to allow for improved ability to perform daily activities   Strength: Improve strength in left shoulder and periscapular muscles to at least 4+/5 for improved shoulder stability  ROM: Improve ROM to 90% of normal limits   Functional scale: Improve score on DASH to   Lifting: Lift 10 lbs to waist level, 4 lbs to shoulder level, 4 lbs to overhead without pain or compensation  Walking: Increase walking distance/duration to 30 mins  without pain  Postures: Increase sitting and/or standing duration to 60 mins  without pain   Transfers: Perform all  transfers without increased pain or limitation  Exercise: demonstrate independence with home exercise program to maintain gains made in therapy.          Plan     Continue with established Plan of Care towards PT goals.    Therapist: Ramona Ron, PT  8/13/2020

## 2020-08-18 ENCOUNTER — CLINICAL SUPPORT (OUTPATIENT)
Dept: REHABILITATION | Facility: HOSPITAL | Age: 80
End: 2020-08-18
Payer: MEDICARE

## 2020-08-18 DIAGNOSIS — M25.512 ACUTE PAIN OF LEFT SHOULDER: Primary | ICD-10-CM

## 2020-08-18 DIAGNOSIS — M54.41 ACUTE RIGHT-SIDED LOW BACK PAIN WITH RIGHT-SIDED SCIATICA: ICD-10-CM

## 2020-08-18 PROCEDURE — 97110 THERAPEUTIC EXERCISES: CPT | Mod: PN

## 2020-08-18 PROCEDURE — 97140 MANUAL THERAPY 1/> REGIONS: CPT | Mod: PN

## 2020-08-18 NOTE — PROGRESS NOTES
Physical Therapy Daily Note     Name: Gardenia ROWAN [facility] Number: [id number]  Diagnosis:   Encounter Diagnoses   Name Primary?    Acute pain of left shoulder Yes    Acute right-sided low back pain with right-sided sciatica      Physician: Lida Hudson  Precautions: standard, fall   Visit #: 13 of 18  PTA Visit #: 0  Time In:  1:00 pm  Time Out:  2:00 pm       Subjective     Pt reports:  Gardenia stated that her arm is feeling somewhat better; still has the most pain reaching up to get or put things on higher shelf.   Pain Scale: Gardenia rates pain on a scale of 0-10 to be 4 currently with activity. Patient reports trouble reaching up into cabinets - aching at night across shoulder     Objective     Gardenia received individual therapeutic exercises to develop ROM, posture and core stabilization for 26 minutes including:    Supine - shoulder flexion with UE [place] x 2 min  Supine - Serratus lifts with UE [place] x 2 min  Wall Slides with lift off x 20  Scapular Retractions: black tband - middle level x 20  Supine PNF - D2 with yellow TB x 20  Supine PNF - D1 with yellow TB x 12  S/L external rotation  2 lbs x 15  - fatigues rapidly after 15 reps   S/L shoulder abduction - 2lbs x 20   Shoulder extension - red tband x 20  Shoulder adduction - red tband x 20  Deltoid walk-outs - red  tband x 10       Gardenia received the following manual therapy techniques: Joint mobilizations and Soft tissue Mobilization were applied to the: left shoulder for 15 miinutes including:  Left shoulder GH joint mobilizations - grade II in all planes; Scapular mobilization in S/L with lateral gliding as well as PNF pattern movements; IASTM to left deltoid, PM, and subscap to address multiple fascia restrictions; Passive range of motion through all planes.     The patient received the following supervised modalities after being cleared for contradictions:     Written Home Exercises  "Provided:   Gardenia was given a written HEP with exercises above as well as theraband   Pt demo good understanding of the education provided. Gardenia demonstrated good return demonstration of activities.     Education provided re:  Gardenia verbalized good understanding of education provided.   No spiritual or educational barriers to learning provided    Assessment     Patient tolerated treatment well. Slowly improving, but still reporting pain.  Injury was about 3-4 months ago...shoulder is functional, but remains painful and a little weak. Patient admits she does not exercise like she should at home. Patient demonstrates functional left shoulder ROM with no significant pain reported other than end range stretch. "lumpy" biceps and anterior deltoid to palpation - feel this is more lack of exercise at this point.  Patient is progressing towards goals.     This is a 80 y.o. female referred to outpatient physical therapy and presents with a medical diagnosis of left shoulder pain  and demonstrates limitations as described in the problem list. Pt prognosis is Good. Pt will continue to benefit from skilled outpatient physical therapy to address the deficits listed in the problem list, provide pt/family education and to maximize pt's level of independence in the home and community environment.     Goals as follows:    Long Term Goals: 6-8 weeks  Pain: Decrease pain to 0/10 to allow for improved ability to perform daily activities   Strength: Improve strength in left shoulder and periscapular muscles to at least 4+/5 for improved shoulder stability  ROM: Improve ROM to 90% of normal limits   Functional scale: Improve score on DASH to   Lifting: Lift 10 lbs to waist level, 4 lbs to shoulder level, 4 lbs to overhead without pain or compensation  Walking: Increase walking distance/duration to 30 mins  without pain  Postures: Increase sitting and/or standing duration to 60 mins  without pain   Transfers: Perform all  transfers " without increased pain or limitation  Exercise: demonstrate independence with home exercise program to maintain gains made in therapy.          Plan     Continue with established Plan of Care towards PT goals.    Therapist: Ramona Ron, PT  8/18/2020

## 2020-08-20 ENCOUNTER — CLINICAL SUPPORT (OUTPATIENT)
Dept: REHABILITATION | Facility: HOSPITAL | Age: 80
End: 2020-08-20
Payer: MEDICARE

## 2020-08-20 DIAGNOSIS — M25.512 ACUTE PAIN OF LEFT SHOULDER: Primary | ICD-10-CM

## 2020-08-20 PROCEDURE — 97140 MANUAL THERAPY 1/> REGIONS: CPT | Mod: PN

## 2020-08-20 PROCEDURE — 97110 THERAPEUTIC EXERCISES: CPT | Mod: PN

## 2020-08-20 NOTE — PROGRESS NOTES
Physical Therapy Daily Note     Name: Gardenia ROWAN [facility] Number: [id number]  Diagnosis:   Encounter Diagnosis   Name Primary?    Acute pain of left shoulder Yes     Physician: Lida Hudson  Precautions: standard, fall   Visit #: 14 of 18  PTA Visit #: 0  Time In:  1:00 pm  Time Out:  2:00 pm       Subjective     Pt reports:  Gardenia stated that her arm is feeling somewhat better; still has the most pain reaching up to get or put things on higher shelf.   Pain Scale: Gardenia rates pain on a scale of 0-10 to be 4 currently with activity. Patient reports trouble reaching up into cabinets - aching at night across shoulder     Objective     Gardenia received individual therapeutic exercises to develop ROM, posture and core stabilization for 26 minutes including:    Supine - shoulder flexion with UE [place] x 2 min  Supine - Serratus lifts with UE [place] x 2 min  Wall Slides with lift off x 20  Scapular Retractions: black tband - middle level x 20  Supine PNF - D2 with yellow TB x 20  Supine PNF - D1 with yellow TB x 12  S/L external rotation  2 lbs x 15  - fatigues rapidly after 15 reps   S/L shoulder abduction - 2lbs x 20   Shoulder extension - red tband x 20  Shoulder adduction - red tband x 20  Deltoid walk-outs - red  tband x 10       Gardenia received the following manual therapy techniques: Joint mobilizations and Soft tissue Mobilization were applied to the: left shoulder for 15 miinutes including:  Left shoulder GH joint mobilizations - grade II in all planes; Scapular mobilization in S/L with lateral gliding as well as PNF pattern movements; IASTM to left deltoid, PM, and subscap to address multiple fascia restrictions; Passive range of motion through all planes.     Shoulder Range of Motion: updated 8/20/2020     Left active Left Passive Right active  Right Passive   Flexion 158 160 160 165   Abduction 155 > 157 158 160 163   Extension 40 43 45 45   Ext.  "Rotation HBH to C7-   Improved ease of movement noted  75 @ 90  78 @ 0 HBH to T2 WNL   Int. Rotation Hand to spine -improved quality of motion  75 TUB to T9 WNL              The patient received the following supervised modalities after being cleared for contradictions:     Written Home Exercises Provided:   Gardenia was given a written HEP with exercises above as well as theraband   Pt demo good understanding of the education provided. Gardenia demonstrated good return demonstration of activities.     Education provided re:  Gardenia verbalized good understanding of education provided.   No spiritual or educational barriers to learning provided    Assessment     Patient tolerated treatment well. ROM improved; pain better today;   Injury was about 3-4 months ago...shoulder is functional, but remains painful and a little weak. Patient admits she does not exercise like she should at home. Patient demonstrates functional left shoulder ROM with no significant pain reported other than end range stretch. "lumpy" biceps and anterior deltoid to palpation - feel this is more lack of exercise at this point.  Patient is progressing towards goals.     This is a 80 y.o. female referred to outpatient physical therapy and presents with a medical diagnosis of left shoulder pain  and demonstrates limitations as described in the problem list. Pt prognosis is Good. Pt will continue to benefit from skilled outpatient physical therapy to address the deficits listed in the problem list, provide pt/family education and to maximize pt's level of independence in the home and community environment.     Goals as follows:    Long Term Goals: 6-8 weeks  Pain: Decrease pain to 0/10 to allow for improved ability to perform daily activities   Strength: Improve strength in left shoulder and periscapular muscles to at least 4+/5 for improved shoulder stability  ROM: Improve ROM to 90% of normal limits   Functional scale: Improve score on DASH to "   Lifting: Lift 10 lbs to waist level, 4 lbs to shoulder level, 4 lbs to overhead without pain or compensation  Walking: Increase walking distance/duration to 30 mins  without pain  Postures: Increase sitting and/or standing duration to 60 mins  without pain   Transfers: Perform all  transfers without increased pain or limitation  Exercise: demonstrate independence with home exercise program to maintain gains made in therapy.          Plan     Continue with established Plan of Care towards PT goals. Gardenia is going to make a follow-up appointment with Dr. Warren's office next week.     Therapist: Ramona Ron, PT  8/20/2020           [phone]

## 2020-08-25 ENCOUNTER — CLINICAL SUPPORT (OUTPATIENT)
Dept: REHABILITATION | Facility: HOSPITAL | Age: 80
End: 2020-08-25
Payer: MEDICARE

## 2020-08-25 DIAGNOSIS — M25.512 ACUTE PAIN OF LEFT SHOULDER: Primary | ICD-10-CM

## 2020-08-25 PROCEDURE — 97110 THERAPEUTIC EXERCISES: CPT | Mod: PN,CQ

## 2020-08-25 PROCEDURE — 97140 MANUAL THERAPY 1/> REGIONS: CPT | Mod: PN

## 2020-08-25 NOTE — PROGRESS NOTES
Physical Therapy Daily Note     Name: Gardenia ROWAN [facility] Number: [id number]  Diagnosis:   Encounter Diagnosis   Name Primary?    Acute pain of left shoulder Yes     Physician: Lida Hudson  Precautions: standard, fall   Visit #: 15 of 18  PTA Visit #: 1  Time In:  3:30 PM  Time Out:  4:10 PM       Subjective     Pt reports:  Gardenia stated that her shoulder and arm have been hurting.    Pain Scale: Gardenia rates pain on a scale of 0-10 to be 7 currently with activity. Patient reports trouble reaching up into cabinets - aching at night across shoulder     Objective     Gardenia received individual therapeutic exercises to develop ROM, posture and core stabilization for 26 minutes including:  UBE x 8 mins  Supine - shoulder flexion with UE [place] x 2 min  Supine - Serratus lifts with UE [place] x 2 min  Wall Slides with lift off x 20  Scapular Retractions: black tband - middle level x 20  Supine PNF - D2 with yellow TB x 15  Supine PNF - D1 with yellow TB x 15  S/L external rotation  2 lbs x 15  - fatigues rapidly after 15 reps   S/L shoulder abduction - 2 lbs x 20   Shoulder extension - red tband x 20  Shoulder adduction - red tband x 20  Deltoid walk-outs - red  tband x 10       Performed by Ramona Ron, PT  Gardenia received the following manual therapy techniques: Joint mobilizations and Soft tissue Mobilization were applied to the: left shoulder for 15 miinutes including:  Left shoulder GH joint mobilizations - grade II in all planes; Scapular mobilization in S/L with lateral gliding as well as PNF pattern movements; IASTM to left deltoid, PM, and subscap to address multiple fascia restrictions; Passive range of motion through all planes.     Shoulder Range of Motion: updated 8/20/2020     Left active Left Passive Right active  Right Passive   Flexion 158 160 160 165   Abduction 155 > 157 158 160 163   Extension 40 43 45 45   Ext. Rotation HBH to C7-   Improved  "ease of movement noted  75 @ 90  78 @ 0 HBH to T2 WNL   Int. Rotation Hand to spine -improved quality of motion  75 TUB to T9 WNL              The patient received the following supervised modalities after being cleared for contradictions:     Written Home Exercises Provided:   Gardenia was given a written HEP with exercises above as well as theraband   Pt demo good understanding of the education provided. Gardenia demonstrated good return demonstration of activities.     Education provided re:  Gardenia verbalized good understanding of education provided.   No spiritual or educational barriers to learning provided    Assessment     Patient tolerated treatment well. ROM improved; pain better today;   Injury was about 3-4 months ago...shoulder is functional, but remains painful and a little weak. Patient admits she does not exercise like she should at home. Patient demonstrates functional left shoulder ROM with no significant pain reported other than end range stretch. "lumpy" biceps and anterior deltoid to palpation - feel this is more lack of exercise at this point.  Patient is progressing towards goals.     This is a 80 y.o. female referred to outpatient physical therapy and presents with a medical diagnosis of left shoulder pain  and demonstrates limitations as described in the problem list. Pt prognosis is Good. Pt will continue to benefit from skilled outpatient physical therapy to address the deficits listed in the problem list, provide pt/family education and to maximize pt's level of independence in the home and community environment.     Goals as follows:    Long Term Goals: 6-8 weeks  Pain: Decrease pain to 0/10 to allow for improved ability to perform daily activities   Strength: Improve strength in left shoulder and periscapular muscles to at least 4+/5 for improved shoulder stability  ROM: Improve ROM to 90% of normal limits   Functional scale: Improve score on DASH to   Lifting: Lift 10 lbs to waist level, 4 " lbs to shoulder level, 4 lbs to overhead without pain or compensation  Walking: Increase walking distance/duration to 30 mins  without pain  Postures: Increase sitting and/or standing duration to 60 mins  without pain   Transfers: Perform all  transfers without increased pain or limitation  Exercise: demonstrate independence with home exercise program to maintain gains made in therapy.          Plan     Continue with established Plan of Care towards PT goals. Gardenia is going to make a follow-up appointment with Dr. Warren's office next week.     Therapist: Jonathan Favre, PTA  8/25/2020

## 2020-08-27 ENCOUNTER — CLINICAL SUPPORT (OUTPATIENT)
Dept: REHABILITATION | Facility: HOSPITAL | Age: 80
End: 2020-08-27
Payer: MEDICARE

## 2020-08-27 DIAGNOSIS — M25.512 ACUTE PAIN OF LEFT SHOULDER: Primary | ICD-10-CM

## 2020-08-27 PROCEDURE — 97140 MANUAL THERAPY 1/> REGIONS: CPT | Mod: PN,CQ

## 2020-08-27 PROCEDURE — 97110 THERAPEUTIC EXERCISES: CPT | Mod: PN,CQ

## 2020-08-27 NOTE — PROGRESS NOTES
Physical Therapy Daily Note     Name: Gardenia ROWAN [facility] Number: [id number]  Diagnosis:   Encounter Diagnosis   Name Primary?    Acute pain of left shoulder Yes     Physician: Lida Hudson  Precautions: standard, fall   Visit #: 16 of 18  PTA Visit #: 2  Time In:  3:30 PM  Time Out:  4:30 PM       Subjective     Pt reports:  No new c/o's.    Pain Scale: Gardenia rates pain on a scale of 0-10 to be 6 currently with activity. Patient reports trouble reaching up into cabinets - aching at night across shoulder     Objective     Gardenia received individual therapeutic exercises to develop ROM, posture and core stabilization for 26 minutes including:  UBE x 8 mins  Supine - shoulder flexion with UE ranger x 2 min  Supine - Serratus lifts with UE [place] x 2 min  Wall Slides with lift off x 20  Scapular Retractions: black tband - middle level x 20  Supine PNF - D2 with yellow TB x 15  Supine PNF - D1 with yellow TB x 15  S/L external rotation  2 lbs x 15  - fatigues rapidly after 15 reps   S/L shoulder abduction - 2 lbs x 20   Shoulder extension - red tband x 20  Shoulder adduction - red tband x 20  Deltoid walk-outs - red  tband x 10       Performed by Ramona Ron, PT  Gardenia received the following manual therapy techniques: Joint mobilizations and Soft tissue Mobilization were applied to the: left shoulder for 15 miinutes including:  Left shoulder GH joint mobilizations - grade II in all planes; Scapular mobilization in S/L with lateral gliding as well as PNF pattern movements; IASTM to left deltoid, PM, and subscap to address multiple fascia restrictions; Passive range of motion through all planes.     Shoulder Range of Motion: updated 8/20/2020     Left active Left Passive Right active  Right Passive   Flexion 158 160 160 165   Abduction 155 > 157 158 160 163   Extension 40 43 45 45   Ext. Rotation HBH to C7-   Improved ease of movement noted  75 @ 90  78 @ 0 HBH  "to T2 WNL   Int. Rotation Hand to spine -improved quality of motion  75 TUB to T9 WNL              The patient received the following supervised modalities after being cleared for contradictions:     Written Home Exercises Provided:   Gardenia was given a written HEP with exercises above as well as theraband   Pt demo good understanding of the education provided. Gardenia demonstrated good return demonstration of activities.     Education provided re:  Gardenia verbalized good understanding of education provided.   No spiritual or educational barriers to learning provided    Assessment     Patient tolerated treatment well. ROM improved; pain better today;   Injury was about 3-4 months ago...shoulder is functional, but remains painful and a little weak. Patient admits she does not exercise like she should at home. Patient demonstrates functional left shoulder ROM with no significant pain reported other than end range stretch. "lumpy" biceps and anterior deltoid to palpation - feel this is more lack of exercise at this point.  Patient is progressing towards goals.     This is a 80 y.o. female referred to outpatient physical therapy and presents with a medical diagnosis of left shoulder pain  and demonstrates limitations as described in the problem list. Pt prognosis is Good. Pt will continue to benefit from skilled outpatient physical therapy to address the deficits listed in the problem list, provide pt/family education and to maximize pt's level of independence in the home and community environment.     Goals as follows:    Long Term Goals: 6-8 weeks  Pain: Decrease pain to 0/10 to allow for improved ability to perform daily activities   Strength: Improve strength in left shoulder and periscapular muscles to at least 4+/5 for improved shoulder stability  ROM: Improve ROM to 90% of normal limits   Functional scale: Improve score on DASH to   Lifting: Lift 10 lbs to waist level, 4 lbs to shoulder level, 4 lbs to overhead " without pain or compensation  Walking: Increase walking distance/duration to 30 mins  without pain  Postures: Increase sitting and/or standing duration to 60 mins  without pain   Transfers: Perform all  transfers without increased pain or limitation  Exercise: demonstrate independence with home exercise program to maintain gains made in therapy.          Plan     Continue with established Plan of Care towards PT goals. Gardenia is going to make a follow-up appointment with Dr. Warren's office next week.     Therapist: Jonathan Favre, PTA  8/27/2020

## 2020-08-27 NOTE — PROGRESS NOTES
Gardenia continues to report pain in her left shoulder - her ROM is functional; strength is improving, but she continues to report general pain with lifting. Encouraged her to make a return appointment with Dr. Warren's office for re-evaluation         Gardenia received the following manual therapy techniques: Joint mobilizations and Soft tissue Mobilization were applied to the: left shoulder for 15 miinutes including:  Left shoulder GH joint mobilizations - grade II in all planes; Scapular mobilization in S/L with lateral gliding as well as PNF pattern movements; IASTM to left deltoid, PM, and subscap to address multiple fascia restrictions; Passive range of motion through all planes.    Gardenia has 3 visits remaining on POC.  If pain continues, recommend re-eval by ortho as opposed to continuing PT     Ramona Ron, PT  8/25/2020

## 2020-08-28 NOTE — PROGRESS NOTES
Gardenia continues to report pain in her left shoulder - her ROM is functional; strength is improving, but she continues to report general pain with lifting. Encouraged her again to make a return appointment with Dr. Warren's office for re-evaluation         Gardenia received the following manual therapy techniques: Joint mobilizations and Soft tissue Mobilization were applied to the: left shoulder for 15 miinutes including:  Left shoulder GH joint mobilizations - grade II in all planes; Scapular mobilization in S/L with lateral gliding as well as PNF pattern movements; IASTM to left deltoid, PM, and subscap to address multiple fascia restrictions; Passive range of motion through all planes.    Gardenia has 2 visits remaining on POC.  If pain continues, recommend re-eval by ortho as opposed to continuing PT     Ramona Ron, PT  8/27/2020

## 2020-09-03 ENCOUNTER — CLINICAL SUPPORT (OUTPATIENT)
Dept: REHABILITATION | Facility: HOSPITAL | Age: 80
End: 2020-09-03
Payer: MEDICARE

## 2020-09-03 DIAGNOSIS — M25.512 ACUTE PAIN OF LEFT SHOULDER: Primary | ICD-10-CM

## 2020-09-03 PROCEDURE — 97110 THERAPEUTIC EXERCISES: CPT | Mod: PN

## 2020-09-03 PROCEDURE — 97140 MANUAL THERAPY 1/> REGIONS: CPT | Mod: PN

## 2020-09-03 NOTE — PROGRESS NOTES
Physical Therapy Daily Note     Name: Gardenia ROWAN [facility] Number: [id number]  Diagnosis:   Encounter Diagnosis   Name Primary?    Acute pain of left shoulder Yes     Physician: Lida Hudson  Precautions: standard, fall   Visit #: 17 of 18  PTA Visit #: 2  Time In:  1:50 pm   Time Out:  2:45 pm       Subjective     Pt reports:  No new c/o's.  Gardenia reports that her shoulder is actually doing better; she just has achiness in her arm at night. She has an appointment with ortho office next Friday.   Pain Scale: Gardenia rates pain on a scale of 0-10 to be 3-4 currently with activity.    Objective     Gardenia received individual therapeutic exercises to develop ROM, posture and core stabilization for 26 minutes including:  UBE x 8 mins  Supine - shoulder flexion with UE ranger x 2 min  Supine - Serratus lifts with UE [place] x 2 min  Wall Slides with lift off x 20  Scapular Retractions: black tband - middle level x 20  Supine PNF - D2 with yellow TB x 15  Supine PNF - D1 with yellow TB x 15  S/L external rotation  2 lbs x 15  - fatigues rapidly after 15 reps   S/L shoulder abduction - 2 lbs x 20   Shoulder extension - red tband x 20  Shoulder adduction - red tband x 20  Deltoid walk-outs - red  tband x 10          Gardenia received the following manual therapy techniques: Joint mobilizations and Soft tissue Mobilization were applied to the: left shoulder for 15 miinutes including:  Left shoulder GH joint mobilizations - grade II in all planes; Scapular mobilization in S/L with lateral gliding as well as PNF pattern movements; IASTM to left deltoid, PM, and subscap to address multiple fascia restrictions; Passive range of motion through all planes.     Shoulder Range of Motion: updated 8/20/2020     Left active Left Passive Right active  Right Passive   Flexion 158 160 160 165   Abduction 155 > 157 158 160 163   Extension 40 43 45 45   Ext. Rotation HBH to C7-    "Improved ease of movement noted  75 @ 90  78 @ 0 HBH to T2 WNL   Int. Rotation Hand to spine -improved quality of motion  75 TUB to T9 WNL              The patient received the following supervised modalities after being cleared for contradictions:     Written Home Exercises Provided:   Gardenia was given a written HEP with exercises above as well as theraband   Pt demo good understanding of the education provided. Gardenia demonstrated good return demonstration of activities.     Education provided re:  Gardenia verbalized good understanding of education provided.   No spiritual or educational barriers to learning provided    Assessment     Patient tolerated treatment well. ROM improved; pain better today;   Injury was about 3-4 months ago...shoulder is functional, but remains painful and a little weak. Patient admits she does not exercise like she should at home. Patient demonstrates functional left shoulder ROM with no significant pain reported other than end range stretch. "lumpy" biceps and anterior deltoid to palpation - feel this is more lack of exercise at this point.  Patient is progressing towards goals. Will re-assess next visit - last one on POC as patient is returning to MD on the next day.     This is a 80 y.o. female referred to outpatient physical therapy and presents with a medical diagnosis of left shoulder pain  and demonstrates limitations as described in the problem list. Pt prognosis is Good. Pt will continue to benefit from skilled outpatient physical therapy to address the deficits listed in the problem list, provide pt/family education and to maximize pt's level of independence in the home and community environment.     Goals as follows:    Long Term Goals: 6-8 weeks  Pain: Decrease pain to 0/10 to allow for improved ability to perform daily activities   Strength: Improve strength in left shoulder and periscapular muscles to at least 4+/5 for improved shoulder stability  ROM: Improve ROM to 90% of " normal limits   Functional scale: Improve score on DASH to   Lifting: Lift 10 lbs to waist level, 4 lbs to shoulder level, 4 lbs to overhead without pain or compensation  Walking: Increase walking distance/duration to 30 mins  without pain  Postures: Increase sitting and/or standing duration to 60 mins  without pain   Transfers: Perform all  transfers without increased pain or limitation  Exercise: demonstrate independence with home exercise program to maintain gains made in therapy.          Plan     Continue with established Plan of Care towards PT goals. Gardenia is going to make a follow-up appointment with Dr. Warren's office next week.     Therapist: Ramona Ron, PT  9/3/2020

## 2020-09-11 ENCOUNTER — CLINICAL SUPPORT (OUTPATIENT)
Dept: REHABILITATION | Facility: HOSPITAL | Age: 80
End: 2020-09-11
Payer: MEDICARE

## 2020-09-11 DIAGNOSIS — M25.512 ACUTE PAIN OF LEFT SHOULDER: Primary | ICD-10-CM

## 2020-09-11 PROCEDURE — 97110 THERAPEUTIC EXERCISES: CPT | Mod: PN,CQ

## 2020-09-11 NOTE — PROGRESS NOTES
Physical Therapy Daily Note     Name: Gardenia ROWAN [facility] Number: [id number]  Diagnosis:   Encounter Diagnosis   Name Primary?    Acute pain of left shoulder Yes     Physician: Lida Hudson  Precautions: standard, fall   Visit #: 18 of 18  PTA Visit #: 2  Time In:  2:00 PM  Time Out:  2:45 PM        Subjective     Pt reports:  She had a cortisone injection in her left shoulder and it did not hurt her at all last night; left shoulder is feeling much better  Pain Scale: Gardenia rates pain on a scale of 0-10 to be 1 currently with activity.    Objective     Gardenia received individual therapeutic exercises to develop ROM, posture and core stabilization for 26 minutes including:  UBE x 8 mins  Supine - shoulder flexion with UE ranger x 2 min  Supine - Serratus lifts with UE [place] x 2 min  Wall Slides with lift off x 20  Scapular Retractions: black tband - middle level x 20  Supine PNF - D2 with yellow TB x 15  Supine PNF - D1 with yellow TB x 15  S/L external rotation  2 lbs x 15  - fatigues rapidly after 15 reps   S/L shoulder abduction - 2 lbs x 20   Shoulder extension - red tband x 20  Shoulder adduction - red tband x 20  Deltoid walk-outs - red  tband x 10        DNP  Gardenia received the following manual therapy techniques: Joint mobilizations and Soft tissue Mobilization were applied to the: left shoulder for 15 miinutes including:  Left shoulder GH joint mobilizations - grade II in all planes; Scapular mobilization in S/L with lateral gliding as well as PNF pattern movements; IASTM to left deltoid, PM, and subscap to address multiple fascia restrictions; Passive range of motion through all planes.     Shoulder Range of Motion: updated 8/20/2020     Left active Left Passive Right active  Right Passive   Flexion 158 160 160 165   Abduction 155 > 157 158 160 163   Extension 40 43 45 45   Ext. Rotation HBH to C7-   Improved ease of movement noted  75 @  "90  78 @ 0 HBH to T2 WNL   Int. Rotation Hand to spine -improved quality of motion  75 TUB to T9 WNL              The patient received the following supervised modalities after being cleared for contradictions:     Written Home Exercises Provided:   Gardenia was given a written HEP with exercises above as well as theraband   Pt demo good understanding of the education provided. Gardenia demonstrated good return demonstration of activities.     Education provided re:  Gardenia verbalized good understanding of education provided.   No spiritual or educational barriers to learning provided    Assessment     Patient tolerated treatment well. ROM improved; pain better today;   Injury was about 3-4 months ago...shoulder is functional, but remains painful and a little weak. Patient admits she does not exercise like she should at home. Patient demonstrates functional left shoulder ROM with no significant pain reported other than end range stretch. "lumpy" biceps and anterior deltoid to palpation - feel this is more lack of exercise at this point.  Patient is progressing towards goals.     This is a 80 y.o. female referred to outpatient physical therapy and presents with a medical diagnosis of left shoulder pain  and demonstrates limitations as described in the problem list. Pt prognosis is Good. Pt will continue to benefit from skilled outpatient physical therapy to address the deficits listed in the problem list, provide pt/family education and to maximize pt's level of independence in the home and community environment.     Goals as follows:    Long Term Goals: 6-8 weeks  Pain: Decrease pain to 0/10 to allow for improved ability to perform daily activities   Strength: Improve strength in left shoulder and periscapular muscles to at least 4+/5 for improved shoulder stability  ROM: Improve ROM to 90% of normal limits   Functional scale: Improve score on DASH to   Lifting: Lift 10 lbs to waist level, 4 lbs to shoulder level, 4 lbs " to overhead without pain or compensation  Walking: Increase walking distance/duration to 30 mins  without pain  Postures: Increase sitting and/or standing duration to 60 mins  without pain   Transfers: Perform all  transfers without increased pain or limitation  Exercise: demonstrate independence with home exercise program to maintain gains made in therapy.          Plan     Patient to follow up with MD in about 6 weeks; will decide then if she is any better or needs to resume therapy at that time; recommend D/C from skilled PT at this time.      Therapist: Jonathan Favre, PTA  9/11/2020

## 2021-01-15 ENCOUNTER — IMMUNIZATION (OUTPATIENT)
Dept: FAMILY MEDICINE | Facility: CLINIC | Age: 81
End: 2021-01-15
Payer: MEDICARE

## 2021-01-15 DIAGNOSIS — Z23 NEED FOR VACCINATION: Primary | ICD-10-CM

## 2021-01-15 PROCEDURE — 0011A COVID-19, MRNA, LNP-S, PF, 100 MCG/0.5 ML DOSE VACCINE: ICD-10-PCS | Mod: ,,, | Performed by: FAMILY MEDICINE

## 2021-01-15 PROCEDURE — 0011A COVID-19, MRNA, LNP-S, PF, 100 MCG/0.5 ML DOSE VACCINE: CPT | Mod: ,,, | Performed by: FAMILY MEDICINE

## 2021-01-15 PROCEDURE — 91301 COVID-19, MRNA, LNP-S, PF, 100 MCG/0.5 ML DOSE VACCINE: CPT | Mod: ,,, | Performed by: FAMILY MEDICINE

## 2021-01-15 PROCEDURE — 91301 COVID-19, MRNA, LNP-S, PF, 100 MCG/0.5 ML DOSE VACCINE: ICD-10-PCS | Mod: ,,, | Performed by: FAMILY MEDICINE

## 2021-02-12 ENCOUNTER — IMMUNIZATION (OUTPATIENT)
Dept: FAMILY MEDICINE | Facility: CLINIC | Age: 81
End: 2021-02-12
Payer: MEDICARE

## 2021-02-12 DIAGNOSIS — Z23 NEED FOR VACCINATION: Primary | ICD-10-CM

## 2021-02-12 PROCEDURE — 91301 COVID-19, MRNA, LNP-S, PF, 100 MCG/0.5 ML DOSE VACCINE: ICD-10-PCS | Mod: S$GLB,,, | Performed by: FAMILY MEDICINE

## 2021-02-12 PROCEDURE — 91301 COVID-19, MRNA, LNP-S, PF, 100 MCG/0.5 ML DOSE VACCINE: CPT | Mod: S$GLB,,, | Performed by: FAMILY MEDICINE

## 2021-02-12 PROCEDURE — 0012A COVID-19, MRNA, LNP-S, PF, 100 MCG/0.5 ML DOSE VACCINE: CPT | Mod: CV19,S$GLB,, | Performed by: FAMILY MEDICINE

## 2021-02-12 PROCEDURE — 0012A COVID-19, MRNA, LNP-S, PF, 100 MCG/0.5 ML DOSE VACCINE: ICD-10-PCS | Mod: CV19,S$GLB,, | Performed by: FAMILY MEDICINE

## 2021-04-07 DIAGNOSIS — E11.9 TYPE 2 DIABETES MELLITUS: Primary | ICD-10-CM

## 2021-04-14 ENCOUNTER — CLINICAL SUPPORT (OUTPATIENT)
Dept: DIABETES | Facility: CLINIC | Age: 81
End: 2021-04-14
Payer: MEDICARE

## 2021-04-14 VITALS — WEIGHT: 165.63 LBS | BODY MASS INDEX: 27.56 KG/M2

## 2021-04-14 DIAGNOSIS — E11.9 TYPE 2 DIABETES MELLITUS WITHOUT COMPLICATION, WITHOUT LONG-TERM CURRENT USE OF INSULIN: ICD-10-CM

## 2021-04-14 DIAGNOSIS — E11.9 TYPE 2 DIABETES MELLITUS: ICD-10-CM

## 2021-04-14 PROCEDURE — G0108 PR DIAB MANAGE TRN  PER INDIV: ICD-10-PCS | Mod: S$GLB,,, | Performed by: DIETITIAN, REGISTERED

## 2021-04-14 PROCEDURE — G0108 DIAB MANAGE TRN  PER INDIV: HCPCS | Mod: S$GLB,,, | Performed by: DIETITIAN, REGISTERED

## 2021-11-05 DIAGNOSIS — S63.641A SPRAIN OF METACARPOPHALANGEAL JOINT OF RIGHT THUMB: Primary | ICD-10-CM

## 2021-11-08 ENCOUNTER — CLINICAL SUPPORT (OUTPATIENT)
Dept: REHABILITATION | Facility: HOSPITAL | Age: 81
End: 2021-11-08
Payer: MEDICARE

## 2021-11-08 DIAGNOSIS — S63.641A SPRAIN OF METACARPOPHALANGEAL (MCP) JOINT OF RIGHT THUMB, INITIAL ENCOUNTER: ICD-10-CM

## 2021-11-08 DIAGNOSIS — S63.641A SPRAIN OF METACARPOPHALANGEAL JOINT OF RIGHT THUMB: ICD-10-CM

## 2021-11-08 PROCEDURE — 97110 THERAPEUTIC EXERCISES: CPT | Mod: PN

## 2021-11-08 PROCEDURE — 97166 OT EVAL MOD COMPLEX 45 MIN: CPT | Mod: PN

## 2021-11-11 ENCOUNTER — CLINICAL SUPPORT (OUTPATIENT)
Dept: REHABILITATION | Facility: HOSPITAL | Age: 81
End: 2021-11-11
Payer: MEDICARE

## 2021-11-11 DIAGNOSIS — S63.641A SPRAIN OF METACARPOPHALANGEAL (MCP) JOINT OF RIGHT THUMB, INITIAL ENCOUNTER: Primary | ICD-10-CM

## 2021-11-11 PROCEDURE — 97018 PARAFFIN BATH THERAPY: CPT | Mod: PN

## 2021-11-11 PROCEDURE — 97110 THERAPEUTIC EXERCISES: CPT | Mod: PN

## 2021-11-18 ENCOUNTER — CLINICAL SUPPORT (OUTPATIENT)
Dept: REHABILITATION | Facility: HOSPITAL | Age: 81
End: 2021-11-18
Payer: MEDICARE

## 2021-11-18 DIAGNOSIS — S63.641A SPRAIN OF METACARPOPHALANGEAL (MCP) JOINT OF RIGHT THUMB, INITIAL ENCOUNTER: Primary | ICD-10-CM

## 2021-11-18 PROCEDURE — 97110 THERAPEUTIC EXERCISES: CPT | Mod: PN

## 2021-11-18 PROCEDURE — 97018 PARAFFIN BATH THERAPY: CPT | Mod: PN

## 2021-11-22 ENCOUNTER — CLINICAL SUPPORT (OUTPATIENT)
Dept: REHABILITATION | Facility: HOSPITAL | Age: 81
End: 2021-11-22
Payer: MEDICARE

## 2021-11-22 DIAGNOSIS — S63.641A SPRAIN OF METACARPOPHALANGEAL (MCP) JOINT OF RIGHT THUMB, INITIAL ENCOUNTER: Primary | ICD-10-CM

## 2021-11-22 PROCEDURE — 97018 PARAFFIN BATH THERAPY: CPT | Mod: PN

## 2021-11-22 PROCEDURE — 97110 THERAPEUTIC EXERCISES: CPT | Mod: PN

## 2021-11-22 PROCEDURE — 97140 MANUAL THERAPY 1/> REGIONS: CPT | Mod: PN

## 2021-11-30 ENCOUNTER — CLINICAL SUPPORT (OUTPATIENT)
Dept: REHABILITATION | Facility: HOSPITAL | Age: 81
End: 2021-11-30
Payer: MEDICARE

## 2021-11-30 DIAGNOSIS — S63.641A SPRAIN OF METACARPOPHALANGEAL (MCP) JOINT OF RIGHT THUMB, INITIAL ENCOUNTER: Primary | ICD-10-CM

## 2021-11-30 PROCEDURE — 97110 THERAPEUTIC EXERCISES: CPT | Mod: PN

## 2021-11-30 PROCEDURE — 97018 PARAFFIN BATH THERAPY: CPT | Mod: PN

## 2021-12-03 ENCOUNTER — CLINICAL SUPPORT (OUTPATIENT)
Dept: REHABILITATION | Facility: HOSPITAL | Age: 81
End: 2021-12-03
Payer: MEDICARE

## 2021-12-03 DIAGNOSIS — S63.641A SPRAIN OF METACARPOPHALANGEAL (MCP) JOINT OF RIGHT THUMB, INITIAL ENCOUNTER: Primary | ICD-10-CM

## 2021-12-03 PROCEDURE — 97110 THERAPEUTIC EXERCISES: CPT | Mod: PN

## 2021-12-03 PROCEDURE — 97018 PARAFFIN BATH THERAPY: CPT | Mod: PN

## 2021-12-06 ENCOUNTER — CLINICAL SUPPORT (OUTPATIENT)
Dept: REHABILITATION | Facility: HOSPITAL | Age: 81
End: 2021-12-06
Payer: MEDICARE

## 2021-12-06 DIAGNOSIS — S63.641A SPRAIN OF METACARPOPHALANGEAL (MCP) JOINT OF RIGHT THUMB, INITIAL ENCOUNTER: Primary | ICD-10-CM

## 2021-12-06 PROCEDURE — 97018 PARAFFIN BATH THERAPY: CPT | Mod: PN

## 2021-12-06 PROCEDURE — 97110 THERAPEUTIC EXERCISES: CPT | Mod: PN

## 2021-12-06 PROCEDURE — 97530 THERAPEUTIC ACTIVITIES: CPT | Mod: PN

## 2021-12-08 ENCOUNTER — CLINICAL SUPPORT (OUTPATIENT)
Dept: REHABILITATION | Facility: HOSPITAL | Age: 81
End: 2021-12-08
Payer: MEDICARE

## 2021-12-08 DIAGNOSIS — S63.641A SPRAIN OF METACARPOPHALANGEAL (MCP) JOINT OF RIGHT THUMB, INITIAL ENCOUNTER: Primary | ICD-10-CM

## 2021-12-08 PROCEDURE — 97018 PARAFFIN BATH THERAPY: CPT | Mod: PN

## 2021-12-08 PROCEDURE — 97110 THERAPEUTIC EXERCISES: CPT | Mod: PN

## 2021-12-15 ENCOUNTER — CLINICAL SUPPORT (OUTPATIENT)
Dept: REHABILITATION | Facility: HOSPITAL | Age: 81
End: 2021-12-15
Payer: MEDICARE

## 2021-12-15 DIAGNOSIS — S63.641A SPRAIN OF METACARPOPHALANGEAL (MCP) JOINT OF RIGHT THUMB, INITIAL ENCOUNTER: Primary | ICD-10-CM

## 2021-12-15 PROCEDURE — 97110 THERAPEUTIC EXERCISES: CPT | Mod: PN

## 2021-12-15 PROCEDURE — 97018 PARAFFIN BATH THERAPY: CPT | Mod: PN

## 2021-12-20 ENCOUNTER — CLINICAL SUPPORT (OUTPATIENT)
Dept: REHABILITATION | Facility: HOSPITAL | Age: 81
End: 2021-12-20
Payer: MEDICARE

## 2021-12-20 DIAGNOSIS — S63.641A SPRAIN OF METACARPOPHALANGEAL (MCP) JOINT OF RIGHT THUMB, INITIAL ENCOUNTER: Primary | ICD-10-CM

## 2021-12-20 PROCEDURE — 97018 PARAFFIN BATH THERAPY: CPT | Mod: KX,PN

## 2021-12-20 PROCEDURE — 97110 THERAPEUTIC EXERCISES: CPT | Mod: KX,PN

## 2021-12-23 ENCOUNTER — OFFICE VISIT (OUTPATIENT)
Dept: PODIATRY | Facility: CLINIC | Age: 81
End: 2021-12-23
Payer: MEDICARE

## 2021-12-23 ENCOUNTER — CLINICAL SUPPORT (OUTPATIENT)
Dept: REHABILITATION | Facility: HOSPITAL | Age: 81
End: 2021-12-23
Payer: MEDICARE

## 2021-12-23 VITALS
RESPIRATION RATE: 18 BRPM | HEIGHT: 65 IN | BODY MASS INDEX: 27.49 KG/M2 | SYSTOLIC BLOOD PRESSURE: 137 MMHG | DIASTOLIC BLOOD PRESSURE: 82 MMHG | WEIGHT: 165 LBS | HEART RATE: 88 BPM

## 2021-12-23 DIAGNOSIS — S63.641A SPRAIN OF METACARPOPHALANGEAL (MCP) JOINT OF RIGHT THUMB, INITIAL ENCOUNTER: Primary | ICD-10-CM

## 2021-12-23 DIAGNOSIS — G60.9 IDIOPATHIC PERIPHERAL NEUROPATHY: ICD-10-CM

## 2021-12-23 DIAGNOSIS — L03.032 PARONYCHIA OF GREAT TOE, LEFT: ICD-10-CM

## 2021-12-23 DIAGNOSIS — L60.0 INGROWN NAIL: Primary | ICD-10-CM

## 2021-12-23 PROCEDURE — 97110 THERAPEUTIC EXERCISES: CPT | Mod: PN

## 2021-12-23 PROCEDURE — 99214 OFFICE O/P EST MOD 30 MIN: CPT | Mod: PBBFAC,PN | Performed by: PODIATRIST

## 2021-12-23 PROCEDURE — 99213 PR OFFICE/OUTPT VISIT, EST, LEVL III, 20-29 MIN: ICD-10-PCS | Mod: S$PBB,,, | Performed by: PODIATRIST

## 2021-12-23 PROCEDURE — 99999 PR PBB SHADOW E&M-EST. PATIENT-LVL IV: CPT | Mod: PBBFAC,,, | Performed by: PODIATRIST

## 2021-12-23 PROCEDURE — 97530 THERAPEUTIC ACTIVITIES: CPT | Mod: PN

## 2021-12-23 PROCEDURE — 97018 PARAFFIN BATH THERAPY: CPT | Mod: PN

## 2021-12-23 PROCEDURE — 99999 PR PBB SHADOW E&M-EST. PATIENT-LVL IV: ICD-10-PCS | Mod: PBBFAC,,, | Performed by: PODIATRIST

## 2021-12-23 PROCEDURE — 99213 OFFICE O/P EST LOW 20 MIN: CPT | Mod: S$PBB,,, | Performed by: PODIATRIST

## 2021-12-23 RX ORDER — TRAMADOL HYDROCHLORIDE 50 MG/1
25 TABLET ORAL
COMMUNITY
Start: 2021-07-13 | End: 2021-12-28

## 2021-12-23 RX ORDER — NAPROXEN SODIUM 220 MG
TABLET ORAL
COMMUNITY
Start: 2021-08-27 | End: 2021-12-28

## 2021-12-23 RX ORDER — FAMOTIDINE 40 MG/1
40 TABLET, FILM COATED ORAL NIGHTLY
COMMUNITY
Start: 2021-06-28 | End: 2022-06-06

## 2021-12-23 RX ORDER — PREDNISONE 10 MG/1
TABLET ORAL
COMMUNITY
Start: 2021-07-13 | End: 2021-12-28

## 2021-12-23 RX ORDER — PANTOPRAZOLE SODIUM 40 MG/1
40 TABLET, DELAYED RELEASE ORAL
COMMUNITY
Start: 2020-11-12 | End: 2021-12-28 | Stop reason: SDUPTHER

## 2021-12-23 RX ORDER — BACLOFEN 20 MG
500 TABLET ORAL
COMMUNITY
Start: 2021-01-19

## 2021-12-23 RX ORDER — LORATADINE PSEUDOEPHEDRINE SULFATE 10; 240 MG/1; MG/1
TABLET, EXTENDED RELEASE ORAL
COMMUNITY
Start: 2020-09-10

## 2021-12-23 RX ORDER — ALPRAZOLAM 0.5 MG/1
0.25 TABLET ORAL
COMMUNITY
Start: 2020-09-10 | End: 2021-12-28

## 2021-12-23 RX ORDER — ROSUVASTATIN CALCIUM 5 MG/1
TABLET, COATED ORAL
COMMUNITY
Start: 2020-06-16

## 2021-12-23 RX ORDER — LEVOTHYROXINE SODIUM 75 UG/1
TABLET ORAL
COMMUNITY
Start: 2021-12-12 | End: 2022-06-06 | Stop reason: SDUPTHER

## 2021-12-23 RX ORDER — FAMOTIDINE 40 MG/1
40 TABLET, FILM COATED ORAL
COMMUNITY
Start: 2021-06-28 | End: 2021-12-28 | Stop reason: SDUPTHER

## 2021-12-23 RX ORDER — NAPROXEN 500 MG/1
500 TABLET ORAL
COMMUNITY
Start: 2021-03-09 | End: 2021-12-28

## 2021-12-27 ENCOUNTER — CLINICAL SUPPORT (OUTPATIENT)
Dept: REHABILITATION | Facility: HOSPITAL | Age: 81
End: 2021-12-27
Payer: MEDICARE

## 2021-12-27 DIAGNOSIS — S63.641A SPRAIN OF METACARPOPHALANGEAL (MCP) JOINT OF RIGHT THUMB, INITIAL ENCOUNTER: Primary | ICD-10-CM

## 2021-12-27 PROCEDURE — 97110 THERAPEUTIC EXERCISES: CPT | Mod: KX,PN

## 2021-12-27 PROCEDURE — 97018 PARAFFIN BATH THERAPY: CPT | Mod: KX,PN

## 2021-12-27 NOTE — PROGRESS NOTES
Occupational Therapy Discharge Summary    Name: Gardenia Dent 1940  MRN: [id number]   Date: 12/27/2021  Principal Problem: R thumb sprain     Subjective:  Patient Discharged from acute Occupational Therapy on 12/27/2021.  Please refer to prior OT noted date on 12/23/2021 for functional status.    Objective: Pt has improved range of motion in R thumb to within functional limits and has improved R hand  strength to 30 lbs, which is pt's baseline.  Pt reports limited pain the R thumb and is using the R hand in functional/daily activities with no concerns.      Pt was given a HEP designed for individuals with arthritis, in order to protect the joints of the hand.     GOALS:    1. Patient will increase right lateral pinch to 15 pounds of force prior to discharge, goal met  2. Patient will report no pain when performing functional and therapeutic activities prior to discharge. Goal met   3. Patient will be compliant with HEP daily prior to discharge, goal met  4. Patient will tolerate all modalities with no report of pain prior to discharge, goal met     Assessment:  Patient has met all goals and is not appropriate for therapy.    Reasons for Discontinuation of Therapy Services  Satisfactory goal achievement.      Plan:  Patient Discharged to: Home with no OT services needed.    Sole Pascal, OT

## 2021-12-28 PROBLEM — L60.0 INGROWN NAIL: Status: ACTIVE | Noted: 2021-12-28

## 2021-12-28 PROBLEM — L03.032 PARONYCHIA OF GREAT TOE, LEFT: Status: ACTIVE | Noted: 2021-12-28

## 2022-02-22 ENCOUNTER — TELEPHONE (OUTPATIENT)
Dept: PODIATRY | Facility: CLINIC | Age: 82
End: 2022-02-22
Payer: MEDICARE

## 2022-02-22 NOTE — TELEPHONE ENCOUNTER
Advised patient 3/2/2022 is the next available but if an opening comes available before then I will contact her.

## 2022-02-22 NOTE — TELEPHONE ENCOUNTER
----- Message from Teri Sheehan sent at 2/22/2022 10:22 AM CST -----  Type:  Sooner Apoointment Request    Caller is requesting a sooner appointment.  Caller declined first available appointment listed below.  Caller will not accept being placed on the waitlist and is requesting a message be sent to doctor.    Name of Caller:  Pt  When is the first available appointment?  03/02  Symptoms:  Possible corn  Best Call Back Number:  [phone]   Additional Information:  Please advise--thank you

## 2022-03-02 ENCOUNTER — OFFICE VISIT (OUTPATIENT)
Dept: PODIATRY | Facility: CLINIC | Age: 82
End: 2022-03-02
Payer: MEDICARE

## 2022-03-02 VITALS
BODY MASS INDEX: 27.49 KG/M2 | DIASTOLIC BLOOD PRESSURE: 76 MMHG | SYSTOLIC BLOOD PRESSURE: 138 MMHG | RESPIRATION RATE: 16 BRPM | HEART RATE: 87 BPM | WEIGHT: 165 LBS | HEIGHT: 65 IN

## 2022-03-02 DIAGNOSIS — M20.41 HAMMER TOES OF BOTH FEET: Primary | ICD-10-CM

## 2022-03-02 DIAGNOSIS — M19.079 OSTEOARTHRITIS OF ANKLE AND FOOT, UNSPECIFIED LATERALITY: ICD-10-CM

## 2022-03-02 DIAGNOSIS — G60.9 IDIOPATHIC PERIPHERAL NEUROPATHY: ICD-10-CM

## 2022-03-02 DIAGNOSIS — L97.511 ULCER OF RIGHT FOOT, LIMITED TO BREAKDOWN OF SKIN: ICD-10-CM

## 2022-03-02 DIAGNOSIS — M20.42 HAMMER TOES OF BOTH FEET: Primary | ICD-10-CM

## 2022-03-02 PROCEDURE — 99213 PR OFFICE/OUTPT VISIT, EST, LEVL III, 20-29 MIN: ICD-10-PCS | Mod: S$PBB,,, | Performed by: PODIATRIST

## 2022-03-02 PROCEDURE — 99213 OFFICE O/P EST LOW 20 MIN: CPT | Mod: S$PBB,,, | Performed by: PODIATRIST

## 2022-03-02 PROCEDURE — 99999 PR PBB SHADOW E&M-EST. PATIENT-LVL V: CPT | Mod: PBBFAC,,, | Performed by: PODIATRIST

## 2022-03-02 PROCEDURE — 99999 PR PBB SHADOW E&M-EST. PATIENT-LVL V: ICD-10-PCS | Mod: PBBFAC,,, | Performed by: PODIATRIST

## 2022-03-02 PROCEDURE — 99215 OFFICE O/P EST HI 40 MIN: CPT | Mod: PBBFAC | Performed by: PODIATRIST

## 2022-03-05 PROBLEM — L97.511 ULCER OF RIGHT FOOT, LIMITED TO BREAKDOWN OF SKIN: Status: ACTIVE | Noted: 2022-03-05

## 2022-03-05 NOTE — PROGRESS NOTES
Subjective:      Patient ID: Gardenia Dent is a 82 y.o. female.    Chief Complaint: Callouses  Patient presents today with complaint of a painfully 2nd digit on the right foot.      Review of Systems   Musculoskeletal: Positive for arthritis, joint pain and joint swelling.   All other systems reviewed and are negative.       Constitutional   Well-nourished, no distress, well oriented    Cardiovascular          No chest pain, no shortness of breath    Respiratory         No cough, no congestion    Musculoskeletal        YES muscle aches, YES arthralgias/joint pain/hands    Neurologic         NEUROPATHY         Objective:      Physical Exam  Vitals and nursing note reviewed.   Constitutional:       Appearance: She is well-developed.   Cardiovascular:      Pulses:           Dorsalis pedis pulses are 1+ on the right side and 1+ on the left side.        Posterior tibial pulses are 1+ on the right side and 1+ on the left side.   Pulmonary:      Effort: Pulmonary effort is normal.   Musculoskeletal:         General: Tenderness and deformity present.      Right foot: Deformity present.      Left foot: Deformity present.   Feet:      Right foot:      Protective Sensation: 4 sites tested. 2 sites sensed.      Skin integrity: Erythema and warmth present.      Left foot:      Protective Sensation: 4 sites tested. 2 sites sensed.      Skin integrity: Erythema and warmth present.   Skin:     General: Skin is warm.      Capillary Refill: Capillary refill takes 2 to 3 seconds.   Neurological:      Mental Status: She is alert.   Psychiatric:         Behavior: Behavior normal.         Thought Content: Thought content normal.         Judgment: Judgment normal.         Integumentary   Narrow, bony foot type and structure with lack of fat pad  Prominent medial eminence HAV right>left foot  Hammertoe second digit right foot, stable    Neurological   Neurological: gross sensation intact.   Experiences paresthesias consistent with  neuropathy, no pain upon compression of web spaces were dorsal cutaneous nerves bilateral feet     Musculoskeletal   Muscle Strength and Tone: normal/  intact/ excellent for age, normal tone    Joints, Bones, and Muscles: limited ROM, hammertoe deformities/semirigid hammertoe second digit right  HAV with bunion/ moderate HAV right, prominent medial eminence, limited range of motion                                      Assessment:       Encounter Diagnoses   Name Primary?    Hammer toes of both feet Yes    Ulcer of right foot, limited to breakdown of skin     Osteoarthritis of ankle and foot, unspecified laterality     Idiopathic peripheral neuropathy          Plan:       [facility]    Diagnoses and all orders for this visit:    Hammer toes of both feet    Ulcer of right foot, limited to breakdown of skin    Osteoarthritis of ankle and foot, unspecified laterality    Idiopathic peripheral neuropathy      Patient presents today with a complaint of a painful 2nd digit on the right foot she states she has never had this problem before it has become increasingly sore tender and she now has a callus.  Patient states she has been watching her son's dog she states that she walks some 4 to 5 times a day she has been doing this for about the past 6 months she states she believes that this is likely cause the irritation and the rubbing between the toes.  On evaluation I non excisionally debrided the hyperkeratotic pre ulcerative lesion on the medial aspect of the 2nd digit right advised the patient this should give her considerable relief however I do want her to use some Silipos toe spacers that I dispensed to her today showing her how to utilize these advised the patient this should prevent the rubbing the irritation she states the dog will also be returning to her son so she will not have to walk him as much this will likely help also.  Patient noted immediate relief following debridement plan  follow-up will be as needed.  This note was created using [employer] voice recognition software that occasionally misinterpreted phrases or words.

## 2022-06-02 ENCOUNTER — OFFICE VISIT (OUTPATIENT)
Dept: PODIATRY | Facility: CLINIC | Age: 82
End: 2022-06-02
Payer: MEDICARE

## 2022-06-02 VITALS
RESPIRATION RATE: 18 BRPM | SYSTOLIC BLOOD PRESSURE: 115 MMHG | HEIGHT: 65 IN | BODY MASS INDEX: 27.49 KG/M2 | WEIGHT: 165 LBS | DIASTOLIC BLOOD PRESSURE: 74 MMHG | HEART RATE: 76 BPM

## 2022-06-02 DIAGNOSIS — M20.41 HAMMER TOES OF BOTH FEET: ICD-10-CM

## 2022-06-02 DIAGNOSIS — G57.93 NEUROPATHY OF BOTH FEET: ICD-10-CM

## 2022-06-02 DIAGNOSIS — E11.9 COMPREHENSIVE DIABETIC FOOT EXAMINATION, TYPE 2 DM, ENCOUNTER FOR: Primary | ICD-10-CM

## 2022-06-02 DIAGNOSIS — M20.11 HALLUX ABDUCTO VALGUS, RIGHT: ICD-10-CM

## 2022-06-02 DIAGNOSIS — E11.9 TYPE II DIABETES MELLITUS, WELL CONTROLLED: ICD-10-CM

## 2022-06-02 DIAGNOSIS — M20.42 HAMMER TOES OF BOTH FEET: ICD-10-CM

## 2022-06-02 PROCEDURE — 99999 PR PBB SHADOW E&M-EST. PATIENT-LVL V: CPT | Mod: PBBFAC,,, | Performed by: PODIATRIST

## 2022-06-02 PROCEDURE — 99999 PR PBB SHADOW E&M-EST. PATIENT-LVL V: ICD-10-PCS | Mod: PBBFAC,,, | Performed by: PODIATRIST

## 2022-06-02 PROCEDURE — 99213 OFFICE O/P EST LOW 20 MIN: CPT | Mod: S$PBB,,, | Performed by: PODIATRIST

## 2022-06-02 PROCEDURE — 99215 OFFICE O/P EST HI 40 MIN: CPT | Mod: PBBFAC | Performed by: PODIATRIST

## 2022-06-02 PROCEDURE — 99213 PR OFFICE/OUTPT VISIT, EST, LEVL III, 20-29 MIN: ICD-10-PCS | Mod: S$PBB,,, | Performed by: PODIATRIST

## 2022-06-02 RX ORDER — ESCITALOPRAM OXALATE 5 MG/1
5 TABLET ORAL
COMMUNITY
Start: 2022-03-22 | End: 2023-09-17 | Stop reason: ALTCHOICE

## 2022-06-02 RX ORDER — LEVOTHYROXINE SODIUM 75 UG/1
TABLET ORAL
COMMUNITY
Start: 2021-12-12 | End: 2023-09-17 | Stop reason: SDUPTHER

## 2022-06-02 RX ORDER — ESCITALOPRAM OXALATE 5 MG/1
5 TABLET ORAL DAILY
COMMUNITY
Start: 2022-03-22 | End: 2022-06-06 | Stop reason: SDUPTHER

## 2022-06-02 RX ORDER — ALPRAZOLAM 0.5 MG/1
0.25 TABLET ORAL
COMMUNITY
Start: 2021-11-22 | End: 2022-06-06

## 2022-06-06 NOTE — PROGRESS NOTES
Subjective:       Patient ID: Gardenia Dent is a 82 y.o. female.    Chief Complaint: Follow-up, Hammer Toe, and Diabetes Mellitus  Patient presents for diabetic foot exam, arthritis, hammertoes, neuropathy, nail problem.  She does relate diabetes is well controlled with no medications at this time.  Has history of back problems.  Taking Cymbalta.  She has been having more problems with her feet recently, especially 2nd digit right foot.  Relates the side of this toe is sore.  Confirms wearing wide comfortable shoes.  Sees Dr. Greene        Hypothyroidism (Discharge Diagnosis) - 11/2/21  Diabetes (Discharge Diagnosis) - 11/2/21  Osteoarthritis (Discharge Diagnosis) - 11/2/21  Chronic diarrhea (Discharge Diagnosis) - 11/2/21  Esophageal reflux (GERD) (Discharge Diagnosis) - 11/2/21  Dyslipidemia (Discharge Diagnosis) - 11/2/21  Anxiety and depression (Discharge Diagnosis) - 11/2/21  Lumbar disc disease with radiculopathy (Discharge Diagnosis) - 11/22/21  Insomnia (Discharge Diagnosis) - 11/22/21  Atypical rash (Discharge Diagnosis) - 11/22/21  Discharge Disposition: Home  Attending Physician: Oxana Greene MD  Admitting Physician: Oxana Greene MD      Past Medical History:   Diagnosis Date    Asthma     GERD (gastroesophageal reflux disease)     Hypothyroidism     Lumbar disc disease with radiculopathy     Type 2 diabetes mellitus 03/23/2021     Past Surgical History:   Procedure Laterality Date    HAND SURGERY      HIP SURGERY      HYSTERECTOMY      SINUS SURGERY      TOTAL KNEE ARTHROPLASTY      WRIST SURGERY       Family History   Problem Relation Age of Onset    Cancer Mother     Diabetes type II Mother     Heart disease Father     Heart disease Brother     Diabetes Brother     Diabetes type II Brother      Social History     Socioeconomic History    Marital status:    Tobacco Use    Smoking status: Never Smoker    Smokeless tobacco: Never Used   Substance and Sexual Activity     Alcohol use: No    Drug use: No    Sexual activity: Never       Current Outpatient Medications   Medication Sig Dispense Refill    ascorbic Acid (VITAMIN C) 500 mg CpSR Vitamin C 500 mg capsule,extended release   Take 1 capsule every day by oral route.      bromelains (BROMELAIN MISC) Bromelain      calcium-vitamin D3 (OS-ANITA 500 + D3) 500 mg-5 mcg (200 unit) per tablet Calcium 500 + D      ginger, Zingiber officinalis, 250 mg Cap Ginger      ibuprofen (ADVIL,MOTRIN) 800 MG tablet TAKE 1 TABLET BY MOUTH EVERY 8 HOURS FOR 10 DAYS AS NEEDED FOR PAIN  0    levothyroxine (SYNTHROID) 75 MCG tablet   = 1 tab, Oral, Daily, # 90 tab, 3 Refill(s), Pharmacy: [facility], 165.1, cm, 11/22/21 11:29:00 CST, Height/Length Measured, 73, kg, 11/22/21 11:29:00 CST, Weight Dosing      loratadine-pseudoephedrine  mg (CLARITIN-D 24 HOUR)  mg per 24 hr tablet   1 tab, Oral, Daily, PRN as needed for congestion, # 30 tab, 1 Refill(s), Pharmacy: [facility] #[id number], 165.1, cm, 09/10/20 9:52:00 CDT, Height/Length Measured, 76.8, kg, 09/10/20 9:52:00 CDT, Weight Dosing      magnesium oxide 500 mg Tab 500 mg.      medical supply, miscellaneous (WALKER TIPS MISC)   walker with wheels, See Instructions, gait insstability, # 1 EA, 0 Refill(s)      multivit with minerals/lutein (MULTIVITAMIN 50 PLUS ORAL) multivitamin      pantoprazole (PROTONIX) 40 MG tablet       rosuvastatin (CRESTOR) 5 MG tablet   See Instructions, TAKE 1 TABLET AT BEDTIME, # 90 tab, 3 Refill(s), Pharmacy: [facility] #[id number], 165.1, cm, 06/05/20 11:18:00 CDT, Height/Length Measured, Weight Dosing      turmeric, bulk, 100 % Powd turmeric (bulk)      zinc acetate 50 mg (zinc) Cap 50 mg.      DULoxetine (CYMBALTA) 20 MG capsule Take 1 capsule (20 mg total) by mouth 2 (two) times daily. 180 capsule 0    EScitalopram oxalate (LEXAPRO) 5 MG Tab 5 mg.       No current facility-administered medications for this visit.  "    Review of patient's allergies indicates:   Allergen Reactions    Bacitracin Itching    Gentamicin     Solifenacin     Sulfa (sulfonamide antibiotics)        Review of Systems   HENT: Negative for congestion.    Respiratory: Negative for cough and shortness of breath.    Cardiovascular: Negative for leg swelling.   All other systems reviewed and are negative.      Objective:      Vitals:    06/02/22 1434   BP: 115/74   Pulse: 76   Resp: 18   Weight: 74.8 kg (165 lb)   Height: 5' 5" (1.651 m)     Physical Exam  Vitals and nursing note reviewed.   Constitutional:       General: She is not in acute distress.  Cardiovascular:      Pulses:           Dorsalis pedis pulses are 2+ on the right side and 2+ on the left side.        Posterior tibial pulses are 1+ on the right side and 1+ on the left side.   Pulmonary:      Effort: Pulmonary effort is normal.   Musculoskeletal:         General: Normal range of motion.      Right foot: Deformity present.      Left foot: Deformity (Hammertoes bilateral, 2nd digit right most severe) and bunion present.   Feet:      Right foot:      Protective Sensation: 6 sites tested. 6 sites sensed.      Skin integrity: Dry skin present.      Toenail Condition: Right toenails are long.      Left foot:      Protective Sensation: 6 sites tested. 6 sites sensed.      Skin integrity: Erythema (Mild erythema due to friction medial 2nd digit right with no skin break, discoloration or calor.  Small area erythema/rubbing dorsal PIPJ hammertoe 2nd digit also with no complications at this time) and dry skin (Mild dry skin heels, various degrees fungal involvement lesser digit nails, 2nd right most severe) present.      Toenail Condition: Left toenails are long.   Skin:     Capillary Refill: Capillary refill takes 2 to 3 seconds.   Neurological:      General: No focal deficit present.      Comments: Sensation intact all areas bilateral feet   Psychiatric:         Mood and Affect: Mood normal.        "  Behavior: Behavior normal.                          Assessment:       1. Comprehensive diabetic foot examination, type 2 DM, encounter for    2. Type II diabetes mellitus, well controlled    3. Hammer toes of both feet    4. Hallux abducto valgus, right    5. Neuropathy of both feet        Plan:         Diabetic pedal exam performed.    Reviewed diabetic education, signs of neuropathy.   Discussed benefit of tight(er) control of glucose/diabetes   Reviewed hammertoes, arthritis rubbing on the medial aspect of the 2nd digit right, irritated with no callus or skin this time.  Instructed patient to soak warm water and Epson salt to reduce irritation and inflammation in this area, performed twice daily until pain and swelling have resolved.  When swelling resolves she can experiment with a light soft comfortable spacer between the toes.  Always remove in the evening to allow any moisture to dry.  Discussed use of baby powder to reduce friction and keep the area dry.  Monitor closely and contact the office with any changes  Reviewed wider, light appropriate tennis shoes,  especially indoors to protect feet, no flat shoes, slippers or walking in sock or bare feet.    Discussed maintenance of skin and nails and potential complications. Nails debrided. No concerns at this time    Reviewed need for daily foot checks and instructed patient to contact the office with any area of redness or swelling which has not improved within 3 days.  Patient was in understanding and agreement with treatment plan.  I counseled the patient on their conditions, implications and medical management.  Instructed patient to contact the office with any changes, questions, concerns, worsening of symptoms.   Total face to face time, exam, assessment, treatment, discussion, documentation 20 minutes, more than half this time spent on consultation and coordination of care.   Follow up prn 3 months    This note was created using [employer] voice  recognition software that occasionally misinterpreted phrases or words.

## 2022-09-01 ENCOUNTER — OFFICE VISIT (OUTPATIENT)
Dept: PODIATRY | Facility: CLINIC | Age: 82
End: 2022-09-01
Payer: MEDICARE

## 2022-09-01 VITALS
SYSTOLIC BLOOD PRESSURE: 130 MMHG | DIASTOLIC BLOOD PRESSURE: 77 MMHG | BODY MASS INDEX: 26.66 KG/M2 | WEIGHT: 160 LBS | RESPIRATION RATE: 16 BRPM | HEART RATE: 83 BPM | HEIGHT: 65 IN

## 2022-09-01 DIAGNOSIS — M20.42 HAMMER TOES OF BOTH FEET: ICD-10-CM

## 2022-09-01 DIAGNOSIS — L85.3 DRY SKIN: ICD-10-CM

## 2022-09-01 DIAGNOSIS — L84 FOOT CALLUS: ICD-10-CM

## 2022-09-01 DIAGNOSIS — E11.9 TYPE II DIABETES MELLITUS, WELL CONTROLLED: ICD-10-CM

## 2022-09-01 DIAGNOSIS — M20.11 HALLUX ABDUCTO VALGUS, RIGHT: Primary | ICD-10-CM

## 2022-09-01 DIAGNOSIS — M20.41 HAMMER TOES OF BOTH FEET: ICD-10-CM

## 2022-09-01 PROCEDURE — 99213 OFFICE O/P EST LOW 20 MIN: CPT | Mod: S$PBB,,, | Performed by: PODIATRIST

## 2022-09-01 PROCEDURE — 99213 PR OFFICE/OUTPT VISIT, EST, LEVL III, 20-29 MIN: ICD-10-PCS | Mod: S$PBB,,, | Performed by: PODIATRIST

## 2022-09-01 PROCEDURE — 99215 OFFICE O/P EST HI 40 MIN: CPT | Mod: PBBFAC | Performed by: PODIATRIST

## 2022-09-01 PROCEDURE — 99999 PR PBB SHADOW E&M-EST. PATIENT-LVL V: ICD-10-PCS | Mod: PBBFAC,,, | Performed by: PODIATRIST

## 2022-09-01 PROCEDURE — 99999 PR PBB SHADOW E&M-EST. PATIENT-LVL V: CPT | Mod: PBBFAC,,, | Performed by: PODIATRIST

## 2022-09-01 RX ORDER — TRAMADOL HYDROCHLORIDE 50 MG/1
50 TABLET ORAL 3 TIMES DAILY PRN
COMMUNITY
Start: 2022-08-06 | End: 2022-12-03 | Stop reason: SDUPTHER

## 2022-09-01 RX ORDER — TRAMADOL HYDROCHLORIDE 50 MG/1
TABLET ORAL
COMMUNITY
Start: 2022-08-30 | End: 2023-09-17

## 2022-09-01 RX ORDER — OMEPRAZOLE 40 MG/1
40 CAPSULE, DELAYED RELEASE ORAL
COMMUNITY
Start: 2022-08-02

## 2022-09-01 RX ORDER — ROSUVASTATIN CALCIUM 5 MG/1
TABLET, COATED ORAL
COMMUNITY
Start: 2022-08-09 | End: 2022-12-03 | Stop reason: SDUPTHER

## 2022-09-01 RX ORDER — DIPHENOXYLATE HYDROCHLORIDE AND ATROPINE SULFATE 2.5; .025 MG/1; MG/1
TABLET ORAL
COMMUNITY
Start: 2022-06-28

## 2022-09-01 RX ORDER — TRIAMCINOLONE ACETONIDE 1 MG/G
CREAM TOPICAL 2 TIMES DAILY
COMMUNITY
Start: 2022-06-27

## 2022-09-01 RX ORDER — FAMOTIDINE 40 MG/1
40 TABLET, FILM COATED ORAL DAILY
COMMUNITY
Start: 2022-06-28

## 2022-09-01 RX ORDER — DIPHENOXYLATE HYDROCHLORIDE AND ATROPINE SULFATE 2.5; .025 MG/1; MG/1
1 TABLET ORAL 4 TIMES DAILY
COMMUNITY
Start: 2022-06-28 | End: 2022-12-03 | Stop reason: SDUPTHER

## 2022-09-09 NOTE — PROGRESS NOTES
Subjective:       Patient ID: Gardenia Dent is a 82 y.o. female.    Chief Complaint: Follow-up, Nail Problem, and Diabetes Mellitus  Patient presents for follow up diabetes, arthritis, bunion right, hammertoes, neuropathy, nail problem. Relates diabetes is well controlled with no medications at this time.  Has history of back problems.  Taking Cymbalta.        Past Medical History:   Diagnosis Date    Anxiety     Asthma     Depression     Dyslipidemia     GERD (gastroesophageal reflux disease)     Hypothyroidism     Lumbar disc disease with radiculopathy     Type 2 diabetes mellitus 03/23/2021     Past Surgical History:   Procedure Laterality Date    HAND SURGERY      HIP SURGERY      HYSTERECTOMY      SINUS SURGERY      TOTAL KNEE ARTHROPLASTY      WRIST SURGERY       Family History   Problem Relation Age of Onset    Cancer Mother     Diabetes type II Mother     Heart disease Father     Heart disease Brother     Diabetes Brother     Diabetes type II Brother      Social History     Socioeconomic History    Marital status:    Tobacco Use    Smoking status: Never    Smokeless tobacco: Never   Substance and Sexual Activity    Alcohol use: No    Drug use: No    Sexual activity: Never       Current Outpatient Medications   Medication Sig Dispense Refill    blood glucose control, high Soln   one touch verio reflect test strips, See Instructions, use in glucose meter to check  bs daily, # 50 EA, 3 Refill(s), Pharmacy: [facility], 165.1, cm, 08/02/22 13:28:00 CDT, Height/Length Measured, 77.8, kg, 08/02/22 13:28:00 CD...      diphenoxylate-atropine 2.5-0.025 mg (LOMOTIL) 2.5-0.025 mg per tablet   1 tab, Oral, QID, # 50 tab, 1 Refill(s), Pharmacy: [facility]/pharmacy #[id number], Chronic diarrhea of unknown origin, 165.1, cm, 06/28/22 13:29:00 CDT, Height/Length Measured, 76.5, kg, 06/28/22 13:29:00 CDT, Weight Dosing      famotidine (PEPCID) 40 MG tablet Take 40 mg by mouth once daily.      murtaza,  Zingiber officinalis, 250 mg Cap Ginger      ibuprofen (ADVIL,MOTRIN) 800 MG tablet TAKE 1 TABLET BY MOUTH EVERY 8 HOURS FOR 10 DAYS AS NEEDED FOR PAIN  0    levothyroxine (SYNTHROID) 75 MCG tablet   = 1 tab, Oral, Daily, # 90 tab, 3 Refill(s), Pharmacy: [facility], 165.1, cm, 11/22/21 11:29:00 CST, Height/Length Measured, 73, kg, 11/22/21 11:29:00 CST, Weight Dosing      loratadine-pseudoephedrine  mg (CLARITIN-D 24 HOUR)  mg per 24 hr tablet   1 tab, Oral, Daily, PRN as needed for congestion, # 30 tab, 1 Refill(s), [id number], 165.1, cm, 09/10/20 9:52:00 CDT, Height/Length Measured, 76.8, kg, 09/10/20 9:52:00 CDT, Weight Dosing      medical supply, miscellaneous (WALKER TIPS MISC)   walker with wheels, See Instructions, gait insstability, # 1 EA, 0 Refill(s)      multivit with minerals/lutein (MULTIVITAMIN 50 PLUS ORAL) multivitamin      omeprazole (PRILOSEC) 40 MG capsule 40 mg.      rosuvastatin (CRESTOR) 5 MG tablet   = 1 tab, Oral, HS, # 90 tab, 3 Refill(s), Maintenance, Pharmacy: [facility], 165.1, cm, 08/02/22 13:28:00 CDT, Height/Length Measured, 77.8, kg, 08/02/22 13:28:00 CDT, Weight Dosing      traMADoL (ULTRAM) 50 mg tablet Take 50 mg by mouth 3 (three) times daily as needed.      turmeric, bulk, 100 % Powd turmeric (bulk)      zinc acetate 50 mg (zinc) Cap 50 mg.      ascorbic Acid (VITAMIN C) 500 mg CpSR Vitamin C 500 mg capsule,extended release   Take 1 capsule every day by oral route.      bromelains (BROMELAIN MISC) Bromelain      calcium-vitamin D3 (OS-ANITA 500 + D3) 500 mg-5 mcg (200 unit) per tablet Calcium 500 + D      diphenoxylate-atropine 2.5-0.025 mg (LOMOTIL) 2.5-0.025 mg per tablet Take 1 tablet by mouth 4 (four) times daily.      DULoxetine (CYMBALTA) 20 MG capsule Take 1 capsule (20 mg total) by mouth 2 (two) times daily. 180 capsule 0    EScitalopram oxalate (LEXAPRO) 5 MG Tab 5 mg.      magnesium oxide 500 mg  "Tab 500 mg.      pantoprazole (PROTONIX) 40 MG tablet       rosuvastatin (CRESTOR) 5 MG tablet   See Instructions, TAKE 1 TABLET AT BEDTIME, # 90 tab, 3 Refill(s), [id number], 165.1, cm, 06/05/20 11:18:00 CDT, Height/Length Measured, Weight Dosing      traMADoL (ULTRAM) 50 mg tablet   See Instructions, prn pain1 po tid-- discontinue alprazolam, # 30 tab, 3 Refill(s), Maintenance, Pharmacy: [facility], 165.1, cm, 08/02/22 13:28:00 CDT, Height/Length Measured, 77.8, kg, 08/02/22 13:28:00 CDT, Weight Dosing      triamcinolone acetonide 0.1% (KENALOG) 0.1 % cream Apply topically 2 (two) times daily.      WALKER MISC   walker with wheels, See Instructions, needs walker for gait instability, # 1 EA, 0 Refill(s)       No current facility-administered medications for this visit.     Review of patient's allergies indicates:   Allergen Reactions    Bacitracin Itching    Gentamicin     Solifenacin     Sulfa (sulfonamide antibiotics)        Review of Systems   HENT:  Negative for congestion.    Respiratory:  Negative for cough.    Cardiovascular:  Negative for leg swelling.   All other systems reviewed and are negative.    Objective:      Vitals:    09/01/22 1141   BP: 130/77   Pulse: 83   Resp: 16   Weight: 72.6 kg (160 lb)   Height: 5' 5" (1.651 m)     Physical Exam  Vitals and nursing note reviewed.   Constitutional:       General: She is not in acute distress.  Cardiovascular:      Pulses:           Dorsalis pedis pulses are 2+ on the right side and 2+ on the left side.        Posterior tibial pulses are 1+ on the right side and 1+ on the left side.   Pulmonary:      Effort: Pulmonary effort is normal.   Musculoskeletal:      Right foot: Deformity present.      Left foot: Deformity (Hammertoes bilateral, 2nd digit right most severe) and bunion present.   Feet:      Right foot:      Protective Sensation:   6 sites sensed.      Skin integrity: Callus (callus medial hallux right foot) and dry " skin present. No skin breakdown.      Toenail Condition: Right toenails are long.      Left foot:      Skin integrity: Dry skin (dry skin heels) present. No skin breakdown.      Toenail Condition: Left toenails are long.   Skin:     Capillary Refill: Capillary refill takes 2 to 3 seconds.   Neurological:      General: No focal deficit present.      Comments: Sensation intact all areas bilateral feet   Psychiatric:         Mood and Affect: Mood normal.         Behavior: Behavior normal.                                      Assessment:       1. Hallux abducto valgus, right    2. Type II diabetes mellitus, well controlled    3. Foot callus    4. Hammer toes of both feet - Right Foot    5. Dry skin        Plan:            Reviewed hammertoes, arthritis, bunion especially right with callus  Reviewed potential complications regarding this area due to friction/condition of skin  Reviewed care of callus right foot, dry skin on heels dry skin top of the feet and lower legs    Callus debrided right foot  Reviewed wide, light appropriate tennis shoes,  especially indoors to protect feet, no flat shoes, slippers or walking in sock or bare feet.    Reviewed diabetic education, foot care  Discussed maintenance of skin and nails and potential complications. Nails debrided. No concerns at this time    Reviewed need for daily foot checks and instructed patient to contact the office with any area of redness or swelling which has not improved within 3 days.  Patient was in understanding and agreement with treatment plan.  I counseled the patient on their conditions, implications and medical management.  Instructed patient to contact the office with any changes, questions, concerns, worsening of symptoms.   Total face to face time, exam, assessment, treatment, discussion, documentation 20 minutes, more than half this time spent on consultation and coordination of care.   Follow up prn 3 months    This note was created using [employer]  voice recognition software that occasionally misinterpreted phrases or words.

## 2022-09-27 DIAGNOSIS — Z98.890 STATUS POST CARPAL TUNNEL RELEASE: ICD-10-CM

## 2022-09-27 DIAGNOSIS — G56.02 CARPAL TUNNEL SYNDROME ON LEFT: Primary | ICD-10-CM

## 2022-09-28 ENCOUNTER — CLINICAL SUPPORT (OUTPATIENT)
Dept: REHABILITATION | Facility: HOSPITAL | Age: 82
End: 2022-09-28
Payer: MEDICARE

## 2022-09-28 DIAGNOSIS — Z98.890 STATUS POST CARPAL TUNNEL RELEASE: ICD-10-CM

## 2022-09-28 DIAGNOSIS — G56.02 CARPAL TUNNEL SYNDROME ON LEFT: ICD-10-CM

## 2022-09-28 PROCEDURE — 97014 ELECTRIC STIMULATION THERAPY: CPT | Mod: PN

## 2022-09-28 PROCEDURE — 97110 THERAPEUTIC EXERCISES: CPT | Mod: PN

## 2022-09-28 PROCEDURE — 97166 OT EVAL MOD COMPLEX 45 MIN: CPT | Mod: PN

## 2022-09-29 PROBLEM — Z98.890 STATUS POST CARPAL TUNNEL RELEASE: Status: ACTIVE | Noted: 2022-09-29

## 2022-09-29 PROBLEM — G56.02 CARPAL TUNNEL SYNDROME ON LEFT: Status: ACTIVE | Noted: 2022-09-29

## 2022-09-29 NOTE — PROGRESS NOTES
"  [facility]  Occupational Therapy Initial Evaluation    Date: 9/28/2022  Name: Gardenia Dent  Clinic Number: [id number]    Therapy Diagnosis:   Encounter Diagnoses   Name Primary?    Carpal tunnel syndrome on left     Status post carpal tunnel release      Physician: Aundrea Madrigal, PhD    Physician Orders: OT evaluate and treat  Medical Diagnosis: Carpal Tunnel Syndrome  Surgical Procedure and Date: 9/8/2022  Evaluation Date: 9/28/2022  Insurance Authorization Period Expiration: 12/31/2022  Plan of Care Certification Period: 11/9/2022  Visit # / Visits authorized: 1 / 12  FOTO: 1/3    Precautions: No heavy pushing or pulling with LUE    Time In:10:15  Time Out: 11:00  Total Appointment Time (timed & untimed codes): 45 minutes    SUBJECTIVE     Date of Onset: 8/2022    History of Current Condition/Mechanism of Injury: Gardenia reports: Patient reports constant numbness and tingling daily in left hand.  Patient also noted with decreased functional  strength and decreased strength in left wrist.     Falls: Mild fall risk    Involved Side: Left   Dominant Side: Right  Prior Therapy: Yes   Occupation:  Retired   Working presently: unemployed    Functional Limitations/Social History:    Previous functional status includes: Independent with all ADLs.     Current Functional Status   Home/Living environment: lives with their spouse      Limitation of Functional Status as follows:   ADLs/IADLs:     - Feeding: Independent     - Bathing: Independent     - Dressing/Grooming: Independent    - Driving: Independent          Pain:  Functional Pain Scale Rating 0-10: 5/10  Location: Left wrist  Description: Aching, Numb, and Sharp  Aggravating Factors: Morning, Extension, Flexing, and Lifting  Easing Factors: relaxation, pain medication, ice, and TENS unit    Patient's Goals for Therapy: "To not have any numbness or tingling."    Medical History:   Past Medical History:   Diagnosis Date    Anxiety     " Asthma     Depression     Dyslipidemia     GERD (gastroesophageal reflux disease)     Hypothyroidism     Lumbar disc disease with radiculopathy     Type 2 diabetes mellitus 03/23/2021       Surgical History:    has a past surgical history that includes Hysterectomy; Total knee arthroplasty; Wrist surgery; Hand surgery; Hip surgery; and Sinus surgery.    Medications:   has a current medication list which includes the following prescription(s): ascorbic acid, blood glucose control, high, bromelains, calcium-vitamin d3, diphenoxylate-atropine 2.5-0.025 mg, diphenoxylate-atropine 2.5-0.025 mg, duloxetine, escitalopram oxalate, famotidine, murtaza (zingiber officinalis), ibuprofen, levothyroxine, claritin-d 24 hour, magnesium oxide, medical supply, miscellaneous, multivit with minerals/lutein, omeprazole, pantoprazole, rosuvastatin, rosuvastatin, tramadol, tramadol, triamcinolone acetonide 0.1%, turmeric (bulk), walker, and zinc acetate.    Allergies:   Review of patient's allergies indicates:   Allergen Reactions    Bacitracin Itching    Gentamicin     Solifenacin     Sulfa (sulfonamide antibiotics)           OBJECTIVE   Patient noted with decreased strength at left wrist.  Patient also noted with decreased functional  strength in left hand as evidenced by patient exhibiting 5 pounds of force when  strength was assessed on dynamometer.  Patient reported having numbness and tingling in left hand.    Limitation/Restriction for FOTO Wrist Survey    Therapist reviewed FOTO scores for Gardenia Dent on 9/28/2022.   FOTO documents entered into [employer] - see Media section.    Limitation Score: 63%         Treatment   Total Treatment time (time-based codes) separate from Evaluation: 30 minutes    Gardenia received the treatments listed below:     Supervised modalities after being cleared for contradictions:   -IFC was applied to left wrist at 15.5 volts cv for 10 minutes in effort to alleviate pain in left wrist.    Direct  contact modalities after being cleared for contraindications:   -Paraffin was applied to left wrist/hand.    Therapeutic exercises to develop strength and ROM in left wrist/hand:  -OT performed LUE A/AROM for wrist flexion/extension 3x10 reps.  -Patient performed LUE AROM for radial/ulnar deviation 3x10 reps.  -Patient performed flex  with left hand 3x10 reps.  -Patient performed left wrist circumduction 3x10 reps.      Patient Education and Home Exercises      Education provided: Patient was educated on the anatomy of left wrist and rationale for each therapeutic intervention.    Written Home Exercises Provided: yes.  Exercises were reviewed and Gardenia was able to demonstrate them prior to the end of the session.  Gardenia demonstrated good  understanding of the education provided. See EMR under Patient Instructions for exercises provided during therapy sessions.     Pt was advised to perform these exercises free of pain, and to stop performing them if pain occurs.    Patient/Family Education: role of OT, goals for OT, scheduling/cancellations - pt verbalized understanding. Discussed insurance limitations with patient.      ASSESSMENT     Gardenia Dent is a 82 y.o. female referred to outpatient occupational therapy and presents with a medical diagnosis of Carpal tunnel syndrome.  Patient presents with the following therapy deficits: Decreased ROM, Decreased  strength, Decreased muscle strength, Decreased functional hand use, Increased pain, and Joint Stiffness and demonstrates limitations as described in the chart below. Following medical record review it is determined that pt will benefit from occupational therapy services in order to maximize pain free and/or functional use of left wrist/hand. The following goals were discussed with the patient and patient is in agreement with them as to be addressed in the treatment plan. The patient's rehab potential is Excellent.     Anticipated barriers to  occupational therapy: None  Pt has no cultural, educational or language barriers to learning provided.    Profile and History Assessment of Occupational Performance Level of Clinical Decision Making Complexity Score   Occupational Profile:   Gardenia Dent is a 82 y.o. female who lives with their spouse and is retired Gardenia Dent has difficulty with  ADLs and IADLs as listed previously, which  Affecting herdaily functional abilities.      Comorbidities:    has a past medical history of Anxiety, Asthma, Depression, Dyslipidemia, GERD (gastroesophageal reflux disease), Hypothyroidism, Lumbar disc disease with radiculopathy, and Type 2 diabetes mellitus.    Medical and Therapy History Review:   Brief               Performance Deficits    Physical:  Joint Mobility  Joint Stability  Muscle Power/Strength   Strength  Fine Motor Coordination  Pain    Cognitive:  No Deficits    Psychosocial:    No Deficits     Clinical Decision Making:  moderate    Assessment Process:  Problem-Focused Assessments    Modification/Need for Assistance:  Not Necessary    Intervention Selection:  Several Treatment Options       moderate  Based on PMHX, co morbidities , data from assessments and functional level of assistance required with task and clinical presentation directly impacting function.       The following goals were discussed with the patient and patient is in agreement with them as to be addressed in the treatment plan.     Goals:   Patient will report no pain when performing functional or therapeutic tasks prior to discharge.  Patient will exhibit a MMT grade of 5/5 at left wrist prior to discharge.  Patient will exhibit 20 pounds of force with left hand when using dynamometer prior to discharge.      PLAN   Plan of Care Certification: 9/28/2022 to 11/9/2022    Outpatient Occupational Therapy 2 times weekly for 6 weeks to include the following interventions: Paraffin, Manual therapy/joint mobilizations, Therapeutic  exercises/activities., Strengthening, and Electrical Modalities.      Noe Infante OT      I CERTIFY THE NEED FOR THESE SERVICES FURNISHED UNDER THIS PLAN OF TREATMENT AND WHILE UNDER MY CARE  Physician's comments:      Physician's Signature: ___________________________________________________

## 2022-09-30 ENCOUNTER — CLINICAL SUPPORT (OUTPATIENT)
Dept: REHABILITATION | Facility: HOSPITAL | Age: 82
End: 2022-09-30
Payer: MEDICARE

## 2022-09-30 DIAGNOSIS — G56.02 CARPAL TUNNEL SYNDROME ON LEFT: Primary | ICD-10-CM

## 2022-09-30 PROCEDURE — 97018 PARAFFIN BATH THERAPY: CPT | Mod: PN

## 2022-09-30 PROCEDURE — 97110 THERAPEUTIC EXERCISES: CPT | Mod: PN

## 2022-09-30 PROCEDURE — 97014 ELECTRIC STIMULATION THERAPY: CPT | Mod: PN

## 2022-09-30 NOTE — PROGRESS NOTES
"                            Occupational Therapy Daily Treatment Note   Name: Gardenia Dent 1940  MRN: [id number]    Visit Date: 9/30/2022  Visit #: 2 / 12  Authorization period Expiration: 12/31/2022    Plan of Care Expiration: 11/9/2022  Precautions: No heavy pushing or pulling with LUE    Time In: 10:55  Time Out: 11:40  Total 1:1 Treatment Time: 45 min    Treatment Diagnosis:   Encounter Diagnosis   Name Primary?    Carpal tunnel syndrome on left Yes     Physician: Aundrea Madrigal MD    Subjective   Pt reports: "My hand still has some tingling."  She was compliant with home exercise program.     Pain Scale:  5/10 on VAS currently, 5/10 on VAS post treatment  Pain Location: left wrist    Objective   Gardenia received therapeutic exercises to develop strength, ROM, and flexibility for left wrist and hand:   -Patient performed hawk  with minimal resistance with LUE 5x10 reps.  -Patient performed LUE AROM for wrist flexion/extension 3x10 reps.  -Patient performed LUE wrist flexion/extension with 1# dowel 3x10 reps.  -Patient performed LUE AROM for ulnar/radial deviation 3x10 reps.      Gardenia received the following direct contact modalities after being cleared for contraindications:   -Heat pack was applied to left wrist for 10 minutes prior to mobilizing.  -Paraffin was applied to left wrist/hand for 10 minutes in effort to alleviate pain and increase blood flow.      Gardenia received the following supervised modalities after being cleared for contradictions:   -IFC was applied to left wrist at 16.0 volts cv for 10 minutes in order to alleviate pain.      Home Exercises and Education Provided     Education provided re:   - progress towards goals   - role of therapy in multi - disciplinary team, goals for therapy  Pt educated on condition, POC, and expectations in therapy.  No spiritual or educational barriers to learning provided    Home exercises:  Pt will be provided HEP during course of treatment with " progressions as appropriate. Pt was advised to perform these exercises free of pain, and to stop performing them if pain occurs.   Gardenia demonstrated good  understanding of the education provided.     Assessment   Gardenia is progressing well towards her goals and no updates to goals at this time.     Pt prognosis is Good. Pt will continue to benefit from skilled outpatient occupational therapy to address the deficits listed in the problem list chart on initial evaluation, provide pt/family education and to maximize pt's level of independence in the home and community environment.     Medical necessity is demonstrated by the impairments and functional limitations listed on the Initial Evaluation.     Anticipated barriers to occupational therapy: None  Pt's spiritual, cultural and educational needs considered and pt agreeable to plan of care and goals.    Goals   Patient will report no pain when performing functional or therapeutic tasks prior to discharge.  Patient will exhibit a MMT grade of 5/5 at left wrist prior to discharge.  Patient will exhibit 20 pounds of force with left hand when using dynamometer prior to discharge.      Plan   Continue with established Plan of Care towards Occupational Therapy goals.   Discussed Plan of Care with patient: Yes    Noe Infante OT  9/30/2022

## 2022-10-05 ENCOUNTER — CLINICAL SUPPORT (OUTPATIENT)
Dept: REHABILITATION | Facility: HOSPITAL | Age: 82
End: 2022-10-05
Payer: MEDICARE

## 2022-10-05 DIAGNOSIS — G56.02 CARPAL TUNNEL SYNDROME ON LEFT: Primary | ICD-10-CM

## 2022-10-05 PROCEDURE — 97018 PARAFFIN BATH THERAPY: CPT | Mod: PN

## 2022-10-05 PROCEDURE — 97014 ELECTRIC STIMULATION THERAPY: CPT | Mod: PN

## 2022-10-05 PROCEDURE — 97110 THERAPEUTIC EXERCISES: CPT | Mod: PN

## 2022-10-05 NOTE — PROGRESS NOTES
"                            Occupational Therapy Daily Treatment Note   Name: Gardenia Dent 1940  MRN: [id number]    Visit Date: 10/5/2022  Visit #: 3/ 12  Authorization period Expiration: 12/31/2022    Plan of Care Expiration: 11/9/2022  Precautions: No heavy pushing or pulling with LUE    Time In: 8:45  Time Out: 9:30  Total 1:1 Treatment Time: 45 min    Treatment Diagnosis:   Encounter Diagnosis   Name Primary?    Carpal tunnel syndrome on left Yes     Physician: Aundrea Madrigal MD    Subjective   Pt reports: "My hand still has some tingling."  She was compliant with home exercise program.     Pain Scale:  5/10 on VAS currently, 5/10 on VAS post treatment  Pain Location: left wrist    Objective   Gardenia received therapeutic exercises to develop strength, ROM, and flexibility for left wrist and hand:   -Patient performed hawk  with minimal resistance with LUE 5x10 reps.  -Patient performed LUE AROM for wrist flexion/extension 3x10 reps.  -Patient performed LUE AROM for ulnar/radial deviation 3x10 reps.    Gardenia received the following manual therapy techniques:  -Scar massage was performed on left wrist in effort to break up scar tissue.      Gardenia received the following direct contact modalities after being cleared for contraindications:   -Heat pack was applied to left wrist for 10 minutes prior to mobilizing.  -Paraffin was applied to left wrist/hand for 10 minutes in effort to alleviate pain and increase blood flow.      Gardenia received the following supervised modalities after being cleared for contradictions:   -IFC was applied to left wrist at 16.0 volts cv for 10 minutes in order to alleviate pain.      Home Exercises and Education Provided     Education provided re:   - progress towards goals   - role of therapy in multi - disciplinary team, goals for therapy  Pt educated on condition, POC, and expectations in therapy.  No spiritual or educational barriers to learning provided    Home " exercises:  Pt will be provided HEP during course of treatment with progressions as appropriate. Pt was advised to perform these exercises free of pain, and to stop performing them if pain occurs.   Gardenia demonstrated good  understanding of the education provided.     Assessment   Gardenia is progressing well towards her goals and no updates to goals at this time.     Pt prognosis is Good. Pt will continue to benefit from skilled outpatient occupational therapy to address the deficits listed in the problem list chart on initial evaluation, provide pt/family education and to maximize pt's level of independence in the home and community environment.     Medical necessity is demonstrated by the impairments and functional limitations listed on the Initial Evaluation.     Anticipated barriers to occupational therapy: None  Pt's spiritual, cultural and educational needs considered and pt agreeable to plan of care and goals.    Goals   Patient will report no pain when performing functional or therapeutic tasks prior to discharge.  Patient will exhibit a MMT grade of 5/5 at left wrist prior to discharge.  Patient will exhibit 20 pounds of force with left hand when using dynamometer prior to discharge.      Plan   Continue with established Plan of Care towards Occupational Therapy goals.   Discussed Plan of Care with patient: Yes    Noe Infante, OT  10/5/2022

## 2022-10-07 ENCOUNTER — CLINICAL SUPPORT (OUTPATIENT)
Dept: REHABILITATION | Facility: HOSPITAL | Age: 82
End: 2022-10-07
Payer: MEDICARE

## 2022-10-07 DIAGNOSIS — G56.02 CARPAL TUNNEL SYNDROME ON LEFT: Primary | ICD-10-CM

## 2022-10-07 PROCEDURE — 97140 MANUAL THERAPY 1/> REGIONS: CPT | Mod: PN

## 2022-10-07 PROCEDURE — 97110 THERAPEUTIC EXERCISES: CPT | Mod: PN

## 2022-10-07 PROCEDURE — 97014 ELECTRIC STIMULATION THERAPY: CPT | Mod: PN

## 2022-10-07 NOTE — PROGRESS NOTES
"                            Occupational Therapy Daily Treatment Note   Name: Gardenia Dent 1940  MRN: [id number]    Visit Date: 10/7/2022  Visit #: 4/ 12  Authorization period Expiration: 12/31/2022    Plan of Care Expiration: 11/9/2022  Precautions: No heavy pushing or pulling with LUE    Time In: 9:15  Time Out: 10:00  Total 1:1 Treatment Time: 45 min    Treatment Diagnosis:   Encounter Diagnosis   Name Primary?    Carpal tunnel syndrome on left Yes     Physician: Aundrea Madrigal MD    Subjective   Pt reports: "My fingers are tingling."  She was compliant with home exercise program.     Pain Scale:  3/10 on VAS currently, 3/10 on VAS post treatment  Pain Location: left wrist    Objective   Gardenia received therapeutic exercises to develop strength, ROM, and flexibility for left wrist and hand:   -Patient performed hawk  with minimal resistance with LUE 5x10 reps.  -Patient performed LUE AROM for wrist flexion/extension with 1# dumbbell 3x10 reps.  -Patient performed LUE AROM for ulnar/radial deviation 3x10 reps.  -Patient performed LUE wrist circumduction with 1# dumbbell 3x10 reps.    Gardenia received the following manual therapy techniques:  -Scar massage was performed on left wrist in effort to break up scar tissue.      Gardenia received the following direct contact modalities after being cleared for contraindications:   -Heat pack was applied to left wrist for 10 minutes prior to mobilizing.  -Paraffin was applied to left wrist/hand for 10 minutes in effort to alleviate pain and increase blood flow.      Gardenia received the following supervised modalities after being cleared for contradictions:   -IFC was applied to left wrist at 16.0 volts cv for 10 minutes in order to alleviate pain.      Home Exercises and Education Provided     Education provided re:   - progress towards goals   - role of therapy in multi - disciplinary team, goals for therapy  Pt educated on condition, POC, and expectations in " therapy.  No spiritual or educational barriers to learning provided    Home exercises:  Pt will be provided HEP during course of treatment with progressions as appropriate. Pt was advised to perform these exercises free of pain, and to stop performing them if pain occurs.   Gardenia demonstrated good  understanding of the education provided.     Assessment   Gardenia is progressing well towards her goals and no updates to goals at this time.     Pt prognosis is Good. Pt will continue to benefit from skilled outpatient occupational therapy to address the deficits listed in the problem list chart on initial evaluation, provide pt/family education and to maximize pt's level of independence in the home and community environment.     Medical necessity is demonstrated by the impairments and functional limitations listed on the Initial Evaluation.     Anticipated barriers to occupational therapy: None  Pt's spiritual, cultural and educational needs considered and pt agreeable to plan of care and goals.    Goals   Patient will report no pain when performing functional or therapeutic tasks prior to discharge.  Patient will exhibit a MMT grade of 5/5 at left wrist prior to discharge.  Patient will exhibit 20 pounds of force with left hand when using dynamometer prior to discharge.      Plan   Continue with established Plan of Care towards Occupational Therapy goals.   Discussed Plan of Care with patient: Yes    Noe Infante, OT  10/7/2022

## 2022-10-10 ENCOUNTER — CLINICAL SUPPORT (OUTPATIENT)
Dept: REHABILITATION | Facility: HOSPITAL | Age: 82
End: 2022-10-10
Payer: MEDICARE

## 2022-10-10 DIAGNOSIS — Z98.890 STATUS POST CARPAL TUNNEL RELEASE: Primary | ICD-10-CM

## 2022-10-10 PROCEDURE — 97018 PARAFFIN BATH THERAPY: CPT | Mod: PN

## 2022-10-10 PROCEDURE — 97110 THERAPEUTIC EXERCISES: CPT | Mod: PN

## 2022-10-10 PROCEDURE — 97014 ELECTRIC STIMULATION THERAPY: CPT | Mod: PN

## 2022-10-10 NOTE — PROGRESS NOTES
"                            Occupational Therapy Daily Treatment Note   Name: Gardenia Dent 1940  MRN: [id number]    Visit Date: 10/10/2022  Visit #: 5/ 12  Authorization period Expiration: 12/31/2022    Plan of Care Expiration: 11/9/2022  Precautions: No heavy pushing or pulling with LUE    Time In: 9:30  Time Out: 10:15  Total 1:1 Treatment Time: 45 min    Treatment Diagnosis:   Encounter Diagnosis   Name Primary?    Status post carpal tunnel release Yes     Physician: Aundrea Madrigal MD    Subjective   Pt reports: "My tingling is easing up."  She was compliant with home exercise program.     Pain Scale:  4/10 on VAS currently, 3/10 on VAS post treatment  Pain Location: left wrist    Objective   Gardneia received therapeutic exercises to develop strength, ROM, and flexibility for left wrist and hand:   -Patient performed hawk  with minimal resistance with LUE 5x10 reps.  -Patient performed LUE AROM for wrist flexion/extension with 2# dumbbell 3x10 reps.  -Patient performed wrist gadget 5x10 reps..  -Patient performed LUE pronation/supination with 2# dumbbell 3x10 reps.    Gardenia received the following manual therapy techniques:  -Scar massage was performed on left wrist in effort to break up scar tissue.      Gardenia received the following direct contact modalities after being cleared for contraindications:   -Heat pack was applied to left wrist for 10 minutes prior to mobilizing.  -Paraffin was applied to left wrist/hand for 10 minutes in effort to alleviate pain and increase blood flow.      Gardenia received the following supervised modalities after being cleared for contradictions:   -IFC was applied to left wrist at 18.0 volts cv for 10 minutes in order to alleviate pain.      Home Exercises and Education Provided     Education provided re:   - progress towards goals   - role of therapy in multi - disciplinary team, goals for therapy  Pt educated on condition, POC, and expectations in therapy.  No spiritual " or educational barriers to learning provided    Home exercises:  Pt will be provided HEP during course of treatment with progressions as appropriate. Pt was advised to perform these exercises free of pain, and to stop performing them if pain occurs.   Gardenia demonstrated good  understanding of the education provided.     Assessment   Gardenia is progressing well towards her goals and no updates to goals at this time.     Pt prognosis is Good. Pt will continue to benefit from skilled outpatient occupational therapy to address the deficits listed in the problem list chart on initial evaluation, provide pt/family education and to maximize pt's level of independence in the home and community environment.     Medical necessity is demonstrated by the impairments and functional limitations listed on the Initial Evaluation.     Anticipated barriers to occupational therapy: None  Pt's spiritual, cultural and educational needs considered and pt agreeable to plan of care and goals.    Goals   Patient will report no pain when performing functional or therapeutic tasks prior to discharge.  Patient will exhibit a MMT grade of 5/5 at left wrist prior to discharge.  Patient will exhibit 20 pounds of force with left hand when using dynamometer prior to discharge.      Plan   Continue with established Plan of Care towards Occupational Therapy goals.   Discussed Plan of Care with patient: Yes    Noe Infante, OT  10/10/2022

## 2022-10-14 ENCOUNTER — CLINICAL SUPPORT (OUTPATIENT)
Dept: REHABILITATION | Facility: HOSPITAL | Age: 82
End: 2022-10-14
Payer: MEDICARE

## 2022-10-14 DIAGNOSIS — Z98.890 STATUS POST CARPAL TUNNEL RELEASE: Primary | ICD-10-CM

## 2022-10-14 PROCEDURE — 97018 PARAFFIN BATH THERAPY: CPT | Mod: PN

## 2022-10-14 PROCEDURE — 97110 THERAPEUTIC EXERCISES: CPT | Mod: PN

## 2022-10-14 PROCEDURE — 97014 ELECTRIC STIMULATION THERAPY: CPT | Mod: PN

## 2022-10-14 NOTE — PROGRESS NOTES
"                            Occupational Therapy Daily Treatment Note   Name: Gardenia Dent 1940  MRN: [id number]    Visit Date: 10/14/2022  Visit #: 6/ 12  Authorization period Expiration: 12/31/2022    Plan of Care Expiration: 11/9/2022  Precautions: No heavy pushing or pulling with LUE    Time In: 9:30  Time Out: 10:15  Total 1:1 Treatment Time: 45 min    Treatment Diagnosis:   Encounter Diagnosis   Name Primary?    Status post carpal tunnel release Yes     Physician: Aundrea Madrigal MD    Subjective   Pt reports: "My hand is slowly getting better."  She was compliant with home exercise program.     Pain Scale:  4/10 on VAS currently, 3/10 on VAS post treatment  Pain Location: left wrist    Objective   Gardenia received therapeutic exercises to develop strength, ROM, and flexibility for left wrist and hand:   -Patient performed hawk  with minimal resistance with LUE 3x10 reps.  -Patient performed LUE AROM for wrist flexion/extension with 2# dumbbell 3x10 reps.  -Patient performed wrist gadget 5x10 reps..  -Patient performed LUE pronation/supination with 2# dumbbell 3x10 reps.    Gardenia received the following manual therapy techniques:  -Scar massage was performed on left wrist in effort to break up scar tissue.      Gardenia received the following direct contact modalities after being cleared for contraindications:   -Ice pack was applied in conjunction with electrical stimulation.  -Heat pack was applied to left wrist for 10 minutes prior to mobilizing.  -Paraffin was applied to left wrist/hand for 10 minutes in effort to alleviate pain and increase blood flow.      Gardenia received the following supervised modalities after being cleared for contradictions:   -IFC was applied to left wrist at 18.0 volts cv for 10 minutes in order to alleviate pain.      Home Exercises and Education Provided     Education provided re:   - progress towards goals   - role of therapy in multi - disciplinary team, goals for " therapy  Pt educated on condition, POC, and expectations in therapy.  No spiritual or educational barriers to learning provided    Home exercises:  Pt will be provided HEP during course of treatment with progressions as appropriate. Pt was advised to perform these exercises free of pain, and to stop performing them if pain occurs.   Gardenia demonstrated good  understanding of the education provided.     Assessment   Gardenia is progressing well towards her goals and no updates to goals at this time.     Pt prognosis is Good. Pt will continue to benefit from skilled outpatient occupational therapy to address the deficits listed in the problem list chart on initial evaluation, provide pt/family education and to maximize pt's level of independence in the home and community environment.     Medical necessity is demonstrated by the impairments and functional limitations listed on the Initial Evaluation.     Anticipated barriers to occupational therapy: None  Pt's spiritual, cultural and educational needs considered and pt agreeable to plan of care and goals.    Goals   Patient will report no pain when performing functional or therapeutic tasks prior to discharge.  Patient will exhibit a MMT grade of 5/5 at left wrist prior to discharge.  Patient will exhibit 20 pounds of force with left hand when using dynamometer prior to discharge.      Plan   Continue with established Plan of Care towards Occupational Therapy goals.   Discussed Plan of Care with patient: Yes    Noe Infante, OT  10/14/2022

## 2022-10-18 ENCOUNTER — CLINICAL SUPPORT (OUTPATIENT)
Dept: REHABILITATION | Facility: HOSPITAL | Age: 82
End: 2022-10-18
Payer: MEDICARE

## 2022-10-18 DIAGNOSIS — G56.02 CARPAL TUNNEL SYNDROME ON LEFT: Primary | ICD-10-CM

## 2022-10-18 PROCEDURE — 97018 PARAFFIN BATH THERAPY: CPT | Mod: PN

## 2022-10-18 PROCEDURE — 97110 THERAPEUTIC EXERCISES: CPT | Mod: PN

## 2022-10-18 PROCEDURE — 97014 ELECTRIC STIMULATION THERAPY: CPT | Mod: PN

## 2022-10-18 NOTE — PROGRESS NOTES
"                            Occupational Therapy Daily Treatment Note   Name: Gardenia Dent 1940  MRN: [id number]    Visit Date: 10/18/2022  Visit #: 7/ 12  Authorization period Expiration: 12/31/2022    Plan of Care Expiration: 11/9/2022  Precautions: No heavy pushing or pulling with LUE    Time In: 10:15  Time Out: 11:00  Total 1:1 Treatment Time: 45 min    Treatment Diagnosis:   Encounter Diagnosis   Name Primary?    Carpal tunnel syndrome on left Yes     Physician: Aundrea Madrigal MD    Subjective   Pt reports: "My hand feels ok today."    She was compliant with home exercise program.     Pain Scale:  3/10 on VAS currently, 3/10 on VAS post treatment  Pain Location: left wrist    Objective   Gardenia received therapeutic exercises to develop strength, ROM, and flexibility for left wrist and hand:   -Patient performed hawk  with minimal resistance with LUE 5x10 reps.  -Patient performed LUE AROM for wrist flexion/extension with 2# dumbbell 5x10 reps.  -Patient performed LUE pronation/supination with 2# dumbbell 3x10 reps.    Gardenia received the following manual therapy techniques:  -Scar massage was performed on left wrist in effort to break up scar tissue.      Gardenia received the following direct contact modalities after being cleared for contraindications:   -Ice pack was applied in conjunction with electrical stimulation.  -Heat pack was applied to left wrist for 10 minutes prior to mobilizing.  -Paraffin was applied to left wrist/hand for 10 minutes in effort to alleviate pain and increase blood flow.      Gardenia received the following supervised modalities after being cleared for contradictions:   -IFC was applied to left wrist at 21.0 volts cv for 10 minutes in order to alleviate pain.      Home Exercises and Education Provided     Education provided re:   - progress towards goals   - role of therapy in multi - disciplinary team, goals for therapy  Pt educated on condition, POC, and expectations in " therapy.  No spiritual or educational barriers to learning provided    Home exercises:  Pt will be provided HEP during course of treatment with progressions as appropriate. Pt was advised to perform these exercises free of pain, and to stop performing them if pain occurs.   Gardenia demonstrated good  understanding of the education provided.     Assessment   Gardenia is progressing well towards her goals and no updates to goals at this time.     Pt prognosis is Good. Pt will continue to benefit from skilled outpatient occupational therapy to address the deficits listed in the problem list chart on initial evaluation, provide pt/family education and to maximize pt's level of independence in the home and community environment.     Medical necessity is demonstrated by the impairments and functional limitations listed on the Initial Evaluation.     Anticipated barriers to occupational therapy: None  Pt's spiritual, cultural and educational needs considered and pt agreeable to plan of care and goals.    Goals   Patient will report no pain when performing functional or therapeutic tasks prior to discharge.  Patient will exhibit a MMT grade of 5/5 at left wrist prior to discharge.  Patient will exhibit 20 pounds of force with left hand when using dynamometer prior to discharge.      Plan   Continue with established Plan of Care towards Occupational Therapy goals.   Discussed Plan of Care with patient: Yes    Noe Ifnante, OT  10/18/2022

## 2022-10-21 ENCOUNTER — CLINICAL SUPPORT (OUTPATIENT)
Dept: REHABILITATION | Facility: HOSPITAL | Age: 82
End: 2022-10-21
Payer: MEDICARE

## 2022-10-21 DIAGNOSIS — Z98.890 STATUS POST CARPAL TUNNEL RELEASE: Primary | ICD-10-CM

## 2022-10-21 PROCEDURE — 97014 ELECTRIC STIMULATION THERAPY: CPT | Mod: PN

## 2022-10-21 PROCEDURE — 97110 THERAPEUTIC EXERCISES: CPT | Mod: PN

## 2022-10-21 PROCEDURE — 97018 PARAFFIN BATH THERAPY: CPT | Mod: PN

## 2022-10-21 NOTE — PROGRESS NOTES
"                            Occupational Therapy Daily Treatment Note   Name: Gardenia Dent 1940  MRN: [id number]    Visit Date: 10/21/2022  Visit #: 8/ 12  Authorization period Expiration: 12/31/2022    Plan of Care Expiration: 11/9/2022  Precautions: No heavy pushing or pulling with LUE    Time In: 10:15  Time Out: 10:45  Total 1:1 Treatment Time: 3045 min    Treatment Diagnosis:   Encounter Diagnosis   Name Primary?    Status post carpal tunnel release Yes     Physician: Aundrea Madrigal MD    Subjective   Pt reports: "My hand hurts at night."    She was compliant with home exercise program.     Pain Scale:  3/10 on VAS currently, 3/10 on VAS post treatment  Pain Location: left wrist    Objective   Gardenia received therapeutic exercises to develop strength, ROM, and flexibility for left wrist and hand:   -Patient performed red digi flex with LUE 5x10 reps.  -Patient performed LUE AROM for wrist flexion/extension with 2# dumbbell 5x10 reps.  -Patient performed LUE pronation/supination with 2# dumbbell 3x10 reps.  -Patient performed wrist gadget with left hand 3x10 reps.    Gardenia received the following manual therapy techniques:  -Scar massage was performed on left wrist in effort to break up scar tissue.      Gardenia received the following direct contact modalities after being cleared for contraindications:   -Ice pack was applied in conjunction with electrical stimulation.  -Heat pack was applied to left wrist for 10 minutes prior to mobilizing.  -Paraffin was applied to left wrist/hand for 10 minutes in effort to alleviate pain and increase blood flow.      Gardenia received the following supervised modalities after being cleared for contradictions:   -IFC was applied to left wrist at 22.0 volts cv for 10 minutes in order to alleviate pain.      Home Exercises and Education Provided     Education provided re:   - progress towards goals   - role of therapy in multi - disciplinary team, goals for therapy  Pt " educated on condition, POC, and expectations in therapy.  No spiritual or educational barriers to learning provided    Home exercises:  Pt will be provided HEP during course of treatment with progressions as appropriate. Pt was advised to perform these exercises free of pain, and to stop performing them if pain occurs.   Gardenia demonstrated good  understanding of the education provided.     Assessment   Gardenia is progressing well towards her goals and no updates to goals at this time.     Pt prognosis is Good. Pt will continue to benefit from skilled outpatient occupational therapy to address the deficits listed in the problem list chart on initial evaluation, provide pt/family education and to maximize pt's level of independence in the home and community environment.     Medical necessity is demonstrated by the impairments and functional limitations listed on the Initial Evaluation.     Anticipated barriers to occupational therapy: None  Pt's spiritual, cultural and educational needs considered and pt agreeable to plan of care and goals.    Goals   Patient will report no pain when performing functional or therapeutic tasks prior to discharge.  Patient will exhibit a MMT grade of 5/5 at left wrist prior to discharge.  Patient will exhibit 20 pounds of force with left hand when using dynamometer prior to discharge.      Plan   Continue with established Plan of Care towards Occupational Therapy goals.   Discussed Plan of Care with patient: Yes    Noe Infante, OT  10/21/2022

## 2022-10-25 ENCOUNTER — CLINICAL SUPPORT (OUTPATIENT)
Dept: REHABILITATION | Facility: HOSPITAL | Age: 82
End: 2022-10-25
Payer: MEDICARE

## 2022-10-25 DIAGNOSIS — G56.02 CARPAL TUNNEL SYNDROME ON LEFT: Primary | ICD-10-CM

## 2022-10-25 PROCEDURE — 97110 THERAPEUTIC EXERCISES: CPT | Mod: PN

## 2022-10-25 PROCEDURE — 97018 PARAFFIN BATH THERAPY: CPT | Mod: PN

## 2022-10-25 PROCEDURE — 97014 ELECTRIC STIMULATION THERAPY: CPT | Mod: PN

## 2022-10-25 NOTE — PROGRESS NOTES
"                            Occupational Therapy Daily Treatment Note   Name: Gardenia Dent 1940  MRN: [id number]    Visit Date: 10/25/2022  Visit #: 9/ 12  Authorization period Expiration: 12/31/2022    Plan of Care Expiration: 11/9/2022  Precautions: No heavy pushing or pulling with LUE    Time In: 10:15  Time Out: 11:00  Total 1:1 Treatment Time: 45 min    Treatment Diagnosis:   Encounter Diagnosis   Name Primary?    Carpal tunnel syndrome on left Yes     Physician: Aundrea Madrigal MD    Subjective   Pt reports: "My tingling is getting better."    She was compliant with home exercise program.     Pain Scale:  2/10 on VAS currently, 2/10 on VAS post treatment  Pain Location: left wrist    Objective   Gardenia received therapeutic exercises to develop strength, ROM, and flexibility for left wrist and hand:   -Patient performed red digi flex with LUE 5x10 reps.  -Patient performed LUE AROM for wrist flexion/extension with 3# dumbbell 5x10 reps.  -Patient performed LUE pronation/supination with 2# dumbbell 5x10 reps.  -Patient performed wrist gadget with left hand 3x10 reps.    Gardenia received the following manual therapy techniques:  -Scar massage was performed on left wrist in effort to break up scar tissue.      Gardenia received the following direct contact modalities after being cleared for contraindications:   -Ice pack was applied in conjunction with electrical stimulation.  -Heat pack was applied to left wrist for 10 minutes prior to mobilizing.  -Paraffin was applied to left wrist/hand for 10 minutes in effort to alleviate pain and increase blood flow.      Gardenia received the following supervised modalities after being cleared for contradictions:   -IFC was applied to left wrist at 23.0 volts cv for 10 minutes in order to alleviate pain.      Home Exercises and Education Provided     Education provided re:   - progress towards goals   - role of therapy in multi - disciplinary team, goals for therapy  Pt " educated on condition, POC, and expectations in therapy.  No spiritual or educational barriers to learning provided    Home exercises:  Pt will be provided HEP during course of treatment with progressions as appropriate. Pt was advised to perform these exercises free of pain, and to stop performing them if pain occurs.   Gardenia demonstrated good  understanding of the education provided.     Assessment   Gardenia is progressing well towards her goals and no updates to goals at this time.     Pt prognosis is Good. Pt will continue to benefit from skilled outpatient occupational therapy to address the deficits listed in the problem list chart on initial evaluation, provide pt/family education and to maximize pt's level of independence in the home and community environment.     Medical necessity is demonstrated by the impairments and functional limitations listed on the Initial Evaluation.     Anticipated barriers to occupational therapy: None  Pt's spiritual, cultural and educational needs considered and pt agreeable to plan of care and goals.    Goals   Patient will report no pain when performing functional or therapeutic tasks prior to discharge.  Patient will exhibit a MMT grade of 5/5 at left wrist prior to discharge.  Patient will exhibit 20 pounds of force with left hand when using dynamometer prior to discharge.      Plan   Continue with established Plan of Care towards Occupational Therapy goals.   Discussed Plan of Care with patient: Yes    Noe Infante, OT  10/25/2022

## 2022-10-28 ENCOUNTER — CLINICAL SUPPORT (OUTPATIENT)
Dept: REHABILITATION | Facility: HOSPITAL | Age: 82
End: 2022-10-28
Payer: MEDICARE

## 2022-10-28 DIAGNOSIS — G56.02 CARPAL TUNNEL SYNDROME ON LEFT: Primary | ICD-10-CM

## 2022-10-28 PROCEDURE — 97018 PARAFFIN BATH THERAPY: CPT | Mod: PN

## 2022-10-28 PROCEDURE — 97014 ELECTRIC STIMULATION THERAPY: CPT | Mod: PN

## 2022-10-28 PROCEDURE — 97110 THERAPEUTIC EXERCISES: CPT | Mod: PN

## 2022-10-28 NOTE — PROGRESS NOTES
"                            Occupational Therapy Daily Treatment Note   Name: Gardenia Dent 1940  MRN: [id number]    Visit Date: 10/28/2022  Visit #: 10/ 12  Authorization period Expiration: 12/31/2022    Plan of Care Expiration: 11/9/2022  Precautions: No heavy pushing or pulling with LUE    Time In: 10:15  Time Out: 11:00  Total 1:1 Treatment Time: 45 min    Treatment Diagnosis:   Encounter Diagnosis   Name Primary?    Carpal tunnel syndrome on left Yes     Physician: Aundrea Madrigal MD    Subjective   Pt reports: "My hand tingles at night."    She was compliant with home exercise program.     Pain Scale:  2/10 on VAS currently, 2/10 on VAS post treatment  Pain Location: left wrist    Objective   Gardenia received therapeutic exercises to develop strength, ROM, and flexibility for left wrist and hand:   -Patient performed red digi flex with LUE 5x10 reps.  -Patient performed LUE ulnar/radial deviation 5x10 reps.  -Patient performed LUE AROM for wrist flexion/extension with 3# dumbbell 5x10 reps.  -Patient performed LUE pronation/supination with 2# dumbbell 5x10 reps.      Gardenia received the following manual therapy techniques:  -Scar massage was performed on left wrist in effort to break up scar tissue.      Gardenia received the following direct contact modalities after being cleared for contraindications:   -Ice pack was applied in conjunction with electrical stimulation.  -Heat pack was applied to left wrist for 10 minutes prior to mobilizing.  -Paraffin was applied to left wrist/hand for 10 minutes in effort to alleviate pain and increase blood flow.      Gardenia received the following supervised modalities after being cleared for contradictions:   -IFC was applied to left wrist at 23.0 volts cv for 10 minutes in order to alleviate pain.      Home Exercises and Education Provided     Education provided re:   - progress towards goals   - role of therapy in multi - disciplinary team, goals for therapy  Pt " educated on condition, POC, and expectations in therapy.  No spiritual or educational barriers to learning provided    Home exercises:  Pt will be provided HEP during course of treatment with progressions as appropriate. Pt was advised to perform these exercises free of pain, and to stop performing them if pain occurs.   Gardenia demonstrated good  understanding of the education provided.     Assessment   Gardenia is progressing well towards her goals and no updates to goals at this time.     Pt prognosis is Good. Pt will continue to benefit from skilled outpatient occupational therapy to address the deficits listed in the problem list chart on initial evaluation, provide pt/family education and to maximize pt's level of independence in the home and community environment.     Medical necessity is demonstrated by the impairments and functional limitations listed on the Initial Evaluation.     Anticipated barriers to occupational therapy: None  Pt's spiritual, cultural and educational needs considered and pt agreeable to plan of care and goals.    Goals   Patient will report no pain when performing functional or therapeutic tasks prior to discharge.  Patient will exhibit a MMT grade of 5/5 at left wrist prior to discharge.  Patient will exhibit 20 pounds of force with left hand when using dynamometer prior to discharge.      Plan   Continue with established Plan of Care towards Occupational Therapy goals.   Discussed Plan of Care with patient: Yes    Noe Infante, OT  10/28/2022

## 2022-11-02 ENCOUNTER — CLINICAL SUPPORT (OUTPATIENT)
Dept: REHABILITATION | Facility: HOSPITAL | Age: 82
End: 2022-11-02
Payer: MEDICARE

## 2022-11-02 DIAGNOSIS — G56.02 CARPAL TUNNEL SYNDROME ON LEFT: Primary | ICD-10-CM

## 2022-11-02 PROCEDURE — 97018 PARAFFIN BATH THERAPY: CPT | Mod: PN

## 2022-11-02 PROCEDURE — 97014 ELECTRIC STIMULATION THERAPY: CPT | Mod: PN

## 2022-11-02 PROCEDURE — 97110 THERAPEUTIC EXERCISES: CPT | Mod: PN

## 2022-11-02 NOTE — PROGRESS NOTES
"                            Occupational Therapy Daily Treatment Note   Name: Gardenia Dent 1940  MRN: [id number]    Visit Date: 11/2/2022  Visit #: 11/ 12  Authorization period Expiration: 12/31/2022    Plan of Care Expiration: 11/9/2022  Precautions: No heavy pushing or pulling with LUE    Time In: 10:15  Time Out: 11:00  Total 1:1 Treatment Time: 45 min    Treatment Diagnosis:   Encounter Diagnosis   Name Primary?    Carpal tunnel syndrome on left Yes     Physician: Aundrea Madrigal MD    Subjective   Pt reports: "My hand is not tingling as bad as it use to at night."    She was compliant with home exercise program.     Pain Scale:  2/10 on VAS currently, 2/10 on VAS post treatment  Pain Location: left wrist    Objective   Gardenia received therapeutic exercises to develop strength, ROM, and flexibility for left wrist and hand:   -Patient performed wrist gadget 3x10 reps.  -Patient performed red digi flex with LUE 3x10 reps.  -Patient performed LUE ulnar/radial deviation 5x10 reps.  -Patient performed LUE AROM for wrist flexion/extension with 3# dumbbell 5x10 reps.  -Patient performed LUE pronation/supination with 2# dumbbell 5x10 reps.      Gardenia received the following manual therapy techniques:  -Scar massage was performed on left wrist in effort to break up scar tissue.      Gardenia received the following direct contact modalities after being cleared for contraindications:   -Heat pack was applied to left wrist for 10 minutes prior to mobilizing.  -Paraffin was applied to left wrist/hand for 10 minutes in effort to alleviate pain and increase blood flow.      Gardenia received the following supervised modalities after being cleared for contradictions:   -IFC was applied to left wrist at 21.0 volts cv for 10 minutes in order to alleviate pain.      Home Exercises and Education Provided     Education provided re:   - progress towards goals   - role of therapy in multi - disciplinary team, goals for therapy  Pt " educated on condition, POC, and expectations in therapy.  No spiritual or educational barriers to learning provided    Home exercises:  Pt will be provided HEP during course of treatment with progressions as appropriate. Pt was advised to perform these exercises free of pain, and to stop performing them if pain occurs.   Gardenia demonstrated good  understanding of the education provided.     Assessment   Gardenia is progressing well towards her goals and no updates to goals at this time.     Pt prognosis is Good. Pt will continue to benefit from skilled outpatient occupational therapy to address the deficits listed in the problem list chart on initial evaluation, provide pt/family education and to maximize pt's level of independence in the home and community environment.     Medical necessity is demonstrated by the impairments and functional limitations listed on the Initial Evaluation.     Anticipated barriers to occupational therapy: None  Pt's spiritual, cultural and educational needs considered and pt agreeable to plan of care and goals.    Goals   Patient will report no pain when performing functional or therapeutic tasks prior to discharge.  Patient will exhibit a MMT grade of 5/5 at left wrist prior to discharge.  Patient will exhibit 20 pounds of force with left hand when using dynamometer prior to discharge.      Plan   Continue with established Plan of Care towards Occupational Therapy goals.   Discussed Plan of Care with patient: Yes    Noe Infante, OT  11/2/2022

## 2022-11-04 ENCOUNTER — CLINICAL SUPPORT (OUTPATIENT)
Dept: REHABILITATION | Facility: HOSPITAL | Age: 82
End: 2022-11-04
Payer: MEDICARE

## 2022-11-04 DIAGNOSIS — G56.02 CARPAL TUNNEL SYNDROME ON LEFT: Primary | ICD-10-CM

## 2022-11-04 PROCEDURE — 97110 THERAPEUTIC EXERCISES: CPT | Mod: PN

## 2022-11-04 PROCEDURE — 97014 ELECTRIC STIMULATION THERAPY: CPT | Mod: PN

## 2022-11-04 PROCEDURE — 97018 PARAFFIN BATH THERAPY: CPT | Mod: PN

## 2022-11-04 NOTE — PROGRESS NOTES
"                            Occupational Therapy Daily Treatment Note   Name: Gardenia Dent 1940  MRN: [id number]    Visit Date: 11/4/2022  Visit #: 12/ 20  Authorization period Expiration: 12/31/2022    Plan of Care Expiration: 11/9/2022  Precautions: No heavy pushing or pulling with LUE    Time In: 10:05  Time Out: 10:50  Total 1:1 Treatment Time: 45 min    Treatment Diagnosis:   Encounter Diagnosis   Name Primary?    Carpal tunnel syndrome on left Yes     Physician: Aundrea Madrigal MD    Subjective   Pt reports: "My hand is starting to feel better."    She was compliant with home exercise program.     Pain Scale:  2/10 on VAS currently, 2/10 on VAS post treatment  Pain Location: left wrist    Objective   Gardenia received therapeutic exercises to develop strength, ROM, and flexibility for left wrist and hand:   -Patient performed wrist gadget 5x10 reps.  -Patient performed red digi flex with LUE 5x10 reps.  -Patient performed LUE AROM for wrist flexion/extension with 3# dumbbell 5x10 reps.  -Patient performed LUE pronation/supination with 3# dumbbell 5x10 reps.      Gardenia received the following manual therapy techniques:  -Thera gun was applied to left wrist in effort to break up scar tissue.      Gardenia received the following direct contact modalities after being cleared for contraindications:   -Heat pack was applied to left wrist for 10 minutes prior to mobilizing.  -Paraffin was applied to left wrist/hand for 10 minutes in effort to alleviate pain and increase blood flow.      Gardenia received the following supervised modalities after being cleared for contradictions:   -IFC was applied to left wrist at 20.0 volts cv for 10 minutes in order to alleviate pain.      Home Exercises and Education Provided     Education provided re:   - progress towards goals   - role of therapy in multi - disciplinary team, goals for therapy  Pt educated on condition, POC, and expectations in therapy.  No spiritual or " educational barriers to learning provided    Home exercises:  Pt will be provided HEP during course of treatment with progressions as appropriate. Pt was advised to perform these exercises free of pain, and to stop performing them if pain occurs.   Gardenia demonstrated good  understanding of the education provided.     Assessment   Gardenia is progressing well towards her goals and no updates to goals at this time.     Pt prognosis is Good. Pt will continue to benefit from skilled outpatient occupational therapy to address the deficits listed in the problem list chart on initial evaluation, provide pt/family education and to maximize pt's level of independence in the home and community environment.     Medical necessity is demonstrated by the impairments and functional limitations listed on the Initial Evaluation.     Anticipated barriers to occupational therapy: None  Pt's spiritual, cultural and educational needs considered and pt agreeable to plan of care and goals.    Goals   Patient will report no pain when performing functional or therapeutic tasks prior to discharge.  Patient will exhibit a MMT grade of 5/5 at left wrist prior to discharge.  Patient will exhibit 20 pounds of force with left hand when using dynamometer prior to discharge.      Plan   Continue with established Plan of Care towards Occupational Therapy goals.   Discussed Plan of Care with patient: Yes    Noe Infante, OT  11/4/2022

## 2022-11-08 ENCOUNTER — CLINICAL SUPPORT (OUTPATIENT)
Dept: REHABILITATION | Facility: HOSPITAL | Age: 82
End: 2022-11-08
Payer: MEDICARE

## 2022-11-08 DIAGNOSIS — G56.02 CARPAL TUNNEL SYNDROME ON LEFT: Primary | ICD-10-CM

## 2022-11-08 PROCEDURE — 97110 THERAPEUTIC EXERCISES: CPT | Mod: PN

## 2022-11-08 PROCEDURE — 97018 PARAFFIN BATH THERAPY: CPT | Mod: 59,PN

## 2022-11-08 PROCEDURE — 97140 MANUAL THERAPY 1/> REGIONS: CPT | Mod: PN

## 2022-11-08 NOTE — PROGRESS NOTES
"                            Occupational Therapy Daily Treatment Note   Name: Gardenia Dent 1940  MRN: [id number]    Visit Date: 11/8/2022  Visit #: 12/ 20  Authorization period Expiration: 12/31/2022    Plan of Care Expiration: 12/12/2022  Precautions: No heavy pushing or pulling with LUE    Time In: 10:05  Time Out: 10:50  Total 1:1 Treatment Time: 45 min    Treatment Diagnosis:   No diagnosis found.    Physician: Aundrea Madrigal MD    Subjective   Pt reports: "My hand is starting to feel better."    She was compliant with home exercise program.     Pain Scale:  0/10 on VAS currently, 2/10 on VAS post treatment  Pain Location: left wrist    Objective   Gardenia received therapeutic exercises to develop strength, ROM, and flexibility for left wrist and hand:   -Patient performed LUE AROM for wrist flexion/extension with 2# dowel 5x10 reps.  -Patient performed LUE pronation/supination with 3# dumbbell 5x10 reps.  -Pt performed L three point pinch using green resistance clip for 3 x 10 reps.  -Pt performed L forearm pronation/supination using weighted handle for 30 reps.    Gardenia received the following manual therapy techniques:  -Thera gun was applied to left wrist in effort to break up scar tissue.    Gardenia received the following direct contact modalities after being cleared for contraindications:   -Paraffin was applied to left wrist/hand for 10 minutes in effort to alleviate pain and increase blood flow.    Home Exercises and Education Provided     Education provided re:   - progress towards goals   - role of therapy in multi - disciplinary team, goals for therapy  Pt educated on condition, POC, and expectations in therapy.  No spiritual or educational barriers to learning provided    Home exercises:  Pt will be provided HEP during course of treatment with progressions as appropriate. Pt was advised to perform these exercises free of pain, and to stop performing them if pain occurs.   Gardenia demonstrated good  " understanding of the education provided.     Assessment   Gardenia is progressing well towards her goals and no updates to goals at this time.     Pt prognosis is Good. Pt will continue to benefit from skilled outpatient occupational therapy to address the deficits listed in the problem list chart on initial evaluation, provide pt/family education and to maximize pt's level of independence in the home and community environment.     Medical necessity is demonstrated by the impairments and functional limitations listed on the Initial Evaluation.     Anticipated barriers to occupational therapy: None  Pt's spiritual, cultural and educational needs considered and pt agreeable to plan of care and goals.    Goals   Patient will report no pain when performing functional or therapeutic tasks prior to discharge.  Patient will exhibit a MMT grade of 5/5 at left wrist prior to discharge.  Patient will exhibit 20 pounds of force with left hand when using dynamometer prior to discharge.      Plan   OT to extend plan of care to 12/12/2022 in order to continue improving strength and range of motion as well as decrease scar tissue build up in the L wrist. Continue with established Plan of Care towards Occupational Therapy goals.   Discussed Plan of Care with patient: Yes    Sole Pascal, OT  11/8/2022

## 2022-11-10 ENCOUNTER — CLINICAL SUPPORT (OUTPATIENT)
Dept: REHABILITATION | Facility: HOSPITAL | Age: 82
End: 2022-11-10
Payer: MEDICARE

## 2022-11-10 DIAGNOSIS — Z98.890 STATUS POST CARPAL TUNNEL RELEASE: Primary | ICD-10-CM

## 2022-11-10 PROCEDURE — 97018 PARAFFIN BATH THERAPY: CPT | Mod: PN

## 2022-11-10 PROCEDURE — 97110 THERAPEUTIC EXERCISES: CPT | Mod: PN

## 2022-11-10 NOTE — PROGRESS NOTES
"                            Occupational Therapy Daily Treatment Note   Name: Gardenia Dent 1940  MRN: [id number]    Visit Date: 11/10/2022  Visit #: 13/ 20  Authorization period Expiration: 12/31/2022    Plan of Care Expiration: 12/12/2022  Precautions: No heavy pushing or pulling with LUE    Time In: 10:15  Time Out: 11:00  Total 1:1 Treatment Time:  45 min    Treatment Diagnosis:   Encounter Diagnosis   Name Primary?    Status post carpal tunnel release Yes       Physician: Aundrea Madrigal MD    Subjective   Pt reports: " I may have injured my hand pushing husbands wheelchair "    She was compliant with home exercise program.     Pain Scale:  5/10 on VAS currently, 6/10 on VAS post treatment  Pain Location: left wrist    Objective   Gardenia received therapeutic exercises to develop strength, ROM, and flexibility for left wrist and hand:   -Patient performed LUE AROM for wrist flexion/extension with 2 # dowel 5x10 reps.  -Patient performed LUE pronation/supination with 3# dumbbell 5x10 reps.  -Pt performed L hand finger lifts for 10 reps   -Pt performed L forearm pronation/supination using weighted handle for 30 reps.  -Pt performed L hand finger curls for 10 reps  -Pt performed L hand opposition for 10 reps  -Pt performed L hand abduction for 10 reps  -Pt performed L hand  using power web for 10 reps  -Pt performed L hand extension using digi extend for 10 reps    Gardenia received the following manual therapy techniques:  -Soft tissue massage was applied to the L hand and wrist for 10 mins in order to alleviate pain     Gardenia received the following direct contact modalities after being cleared for contraindications:   -Paraffin was applied to left wrist/hand for 10 minutes in effort to alleviate pain and increase blood flow.    Home Exercises and Education Provided     Education provided re:   - progress towards goals   - role of therapy in multi - disciplinary team, goals for therapy  Pt educated on " condition, POC, and expectations in therapy.  No spiritual or educational barriers to learning provided    Home exercises:  Pt will be provided HEP during course of treatment with progressions as appropriate. Pt was advised to perform these exercises free of pain, and to stop performing them if pain occurs.   Gardenia demonstrated good  understanding of the education provided.     Assessment   Gardenia is progressing well towards her goals and no updates to goals at this time.   Pt performed fine motor assessment today using the L hand; 39 seconds to fill one row     Pt prognosis is Good. Pt will continue to benefit from skilled outpatient occupational therapy to address the deficits listed in the problem list chart on initial evaluation, provide pt/family education and to maximize pt's level of independence in the home and community environment.     Medical necessity is demonstrated by the impairments and functional limitations listed on the Initial Evaluation.     Anticipated barriers to occupational therapy: None  Pt's spiritual, cultural and educational needs considered and pt agreeable to plan of care and goals.    Goals   Patient will report no pain when performing functional or therapeutic tasks prior to discharge.  Patient will exhibit a MMT grade of 5/5 at left wrist prior to discharge.  Patient will exhibit 20 pounds of force with left hand when using dynamometer prior to discharge.      Plan   OT to extend plan of care to 12/12/2022 in order to continue improving strength and range of motion as well as decrease scar tissue build up in the L wrist. Continue with established Plan of Care towards Occupational Therapy goals.   Discussed Plan of Care with patient: Yes    Sole Pascal, OT  11/10/2022

## 2022-11-15 ENCOUNTER — CLINICAL SUPPORT (OUTPATIENT)
Dept: REHABILITATION | Facility: HOSPITAL | Age: 82
End: 2022-11-15
Payer: MEDICARE

## 2022-11-15 DIAGNOSIS — G56.02 CARPAL TUNNEL SYNDROME ON LEFT: Primary | ICD-10-CM

## 2022-11-15 PROCEDURE — 97035 APP MDLTY 1+ULTRASOUND EA 15: CPT | Mod: PN

## 2022-11-15 PROCEDURE — 97140 MANUAL THERAPY 1/> REGIONS: CPT | Mod: PN

## 2022-11-15 PROCEDURE — 97110 THERAPEUTIC EXERCISES: CPT | Mod: PN

## 2022-11-15 NOTE — PROGRESS NOTES
"                            Occupational Therapy Daily Treatment Note   Name: Gardenia Dent 1940  MRN: [id number]    Visit Date: 11/15/2022  Visit #: 14 / 20  Authorization period Expiration: 12/31/2022    Plan of Care Expiration: 12/12/2022  Precautions: No heavy pushing or pulling with LUE    Time In: 12: 30  Time Out: 1: 15  Total 1:1 Treatment Time:  45 min    Treatment Diagnosis:   Encounter Diagnosis   Name Primary?    Carpal tunnel syndrome on left Yes       Physician: No ref. provider found    Subjective   Pt reports: " Its pretty red today "    She was compliant with home exercise program.     Pain Scale: numbness and discomfort ; no real pain indicated   Pain Location: left wrist    Objective   Gardenia received therapeutic exercises to develop strength, ROM, and flexibility for left wrist and hand:   -Patient performed L wrist flexion using 2 # wt for 3 x 10 reps  -Pt performed L wrist wrist extension using 2 # wt for 3 x 10 reps  -Pt performed L forearm pronation/supination using 2 # wt for 3 x 10 reps  -Pt performed resisted wrist flexion /extension using wringing tool for 3 x 10 reps  -Pt performed hand strengthening task using 1 # wt to press into green theraputty    Gardenia received the following manual therapy techniques:  -Soft tissue massage was applied to the L hand and wrist for 10 mins in order to alleviate pain     Gardenia received the following direct contact modalities after being cleared for contraindications:   -Paraffin was applied to left wrist/hand for 10 minutes in effort to alleviate pain and increase blood flow.  -US to the L wrist over the incision @ 1.5 w/cm2 n order to break up scar tissue   Home Exercises and Education Provided     Education provided re:   - progress towards goals   - role of therapy in multi - disciplinary team, goals for therapy  Pt educated on condition, POC, and expectations in therapy.  No spiritual or educational barriers to learning provided    Home " exercises:  Pt will be provided HEP during course of treatment with progressions as appropriate. Pt was advised to perform these exercises free of pain, and to stop performing them if pain occurs.   Gardenia demonstrated good  understanding of the education provided.     Assessment   Gardenia is progressing well towards her goals and no updates to goals at this time.   Pt performed fine motor assessment today using the L hand; 39 seconds to fill one row     Pt prognosis is Good. Pt will continue to benefit from skilled outpatient occupational therapy to address the deficits listed in the problem list chart on initial evaluation, provide pt/family education and to maximize pt's level of independence in the home and community environment.     Medical necessity is demonstrated by the impairments and functional limitations listed on the Initial Evaluation.     Anticipated barriers to occupational therapy: None  Pt's spiritual, cultural and educational needs considered and pt agreeable to plan of care and goals.    Goals   Patient will report no pain when performing functional or therapeutic tasks prior to discharge.  Patient will exhibit a MMT grade of 5/5 at left wrist prior to discharge.  Patient will exhibit 20 pounds of force with left hand when using dynamometer prior to discharge.      Plan   OT to extend plan of care to 12/12/2022 in order to continue improving strength and range of motion as well as decrease scar tissue build up in the L wrist. Continue with established Plan of Care towards Occupational Therapy goals.   Discussed Plan of Care with patient: My Pascal, OT  11/15/2022

## 2022-11-17 ENCOUNTER — CLINICAL SUPPORT (OUTPATIENT)
Dept: REHABILITATION | Facility: HOSPITAL | Age: 82
End: 2022-11-17
Payer: MEDICARE

## 2022-11-17 DIAGNOSIS — G56.02 CARPAL TUNNEL SYNDROME ON LEFT: Primary | ICD-10-CM

## 2022-11-17 PROCEDURE — 97110 THERAPEUTIC EXERCISES: CPT | Mod: PN

## 2022-11-17 PROCEDURE — 97140 MANUAL THERAPY 1/> REGIONS: CPT | Mod: PN

## 2022-11-17 PROCEDURE — 97018 PARAFFIN BATH THERAPY: CPT | Mod: PN

## 2022-11-17 NOTE — PROGRESS NOTES
"                            Occupational Therapy Daily Treatment Note   Name: Gardenia Dent 1940  MRN: [id number]    Visit Date: 11/17/2022  Visit #: 15 / 20  Authorization period Expiration: 12/31/2022    Plan of Care Expiration: 12/12/2022  Precautions: No heavy pushing or pulling with LUE    Time In: 11: 00  Time Out: 11: 45  Total 1:1 Treatment Time:  45 min    Treatment Diagnosis:   Encounter Diagnosis   Name Primary?    Carpal tunnel syndrome on left Yes       Physician: No ref. provider found    Subjective   Pt reports: " I am hurting today from pushing my  around"    She was compliant with home exercise program.     Pain Scale: 6/10 pain level  Pain Location: left wrist    Objective   Gardenia received therapeutic exercises to develop strength, ROM, and flexibility for left wrist and hand:   -Patient performed L wrist flexion using 2# wt, bringing wt down to tips of fingers for 3 x 10 reps  -Pt performed L wrist strengthening using blue theraband for the following motions:   radial /ulnar deviation, flexion, extension for 3 x 10 reps  -Pt performed L hand  strength using power web for 10 reps    Gardenia received the following manual therapy techniques:  -Soft tissue massage was applied to the L hand and wrist for 10 mins in order to alleviate pain     Gardenia received the following direct contact modalities after being cleared for contraindications:   -Paraffin was applied to left wrist/hand for 10 minutes in effort to alleviate pain and increase blood flow.  Home Exercises and Education Provided     Education provided re:   - progress towards goals   - role of therapy in multi - disciplinary team, goals for therapy  Pt educated on condition, POC, and expectations in therapy.  No spiritual or educational barriers to learning provided    Home exercises:  Pt will be provided HEP during course of treatment with progressions as appropriate. Pt was advised to perform these exercises free of pain, and " to stop performing them if pain occurs.   Gardenia demonstrated good  understanding of the education provided.     Assessment   Gardenia is progressing well towards her goals and no updates to goals at this time.   Pt performed fine motor assessment today using the L hand; 39 seconds to fill one row     Pt prognosis is Good. Pt will continue to benefit from skilled outpatient occupational therapy to address the deficits listed in the problem list chart on initial evaluation, provide pt/family education and to maximize pt's level of independence in the home and community environment.     Medical necessity is demonstrated by the impairments and functional limitations listed on the Initial Evaluation.     Anticipated barriers to occupational therapy: None  Pt's spiritual, cultural and educational needs considered and pt agreeable to plan of care and goals.    Goals   Patient will report no pain when performing functional or therapeutic tasks prior to discharge.  Patient will exhibit a MMT grade of 5/5 at left wrist prior to discharge.  Patient will exhibit 20 pounds of force with left hand when using dynamometer prior to discharge.      Plan   OT to extend plan of care to 12/12/2022 in order to continue improving strength and range of motion as well as decrease scar tissue build up in the L wrist. Continue with established Plan of Care towards Occupational Therapy goals.   Discussed Plan of Care with patient: Yes    Sole Pascal, OT  11/17/2022

## 2022-11-21 ENCOUNTER — CLINICAL SUPPORT (OUTPATIENT)
Dept: REHABILITATION | Facility: HOSPITAL | Age: 82
End: 2022-11-21
Payer: MEDICARE

## 2022-11-21 DIAGNOSIS — G56.02 CARPAL TUNNEL SYNDROME ON LEFT: Primary | ICD-10-CM

## 2022-11-21 PROCEDURE — 97110 THERAPEUTIC EXERCISES: CPT | Mod: PN

## 2022-11-21 PROCEDURE — 97140 MANUAL THERAPY 1/> REGIONS: CPT | Mod: PN

## 2022-11-21 PROCEDURE — 97018 PARAFFIN BATH THERAPY: CPT | Mod: PN

## 2022-11-21 NOTE — PROGRESS NOTES
"                            Occupational Therapy Daily Treatment Note   Name: Gardenia Dent 1940  MRN: [id number]    Visit Date: 11/21/2022  Visit #: 16 / 20  Authorization period Expiration: 12/31/2022    Plan of Care Expiration: 12/12/2022  Precautions: No heavy pushing or pulling with LUE    Time In: 11: 00  Time Out: 11: 45  Total 1:1 Treatment Time:  45 min    Treatment Diagnosis:   Encounter Diagnosis   Name Primary?    Carpal tunnel syndrome on left Yes       Physician: No ref. provider found    Subjective   Pt reports: " I am still real sore in my hand from wheeling my  around"    She was compliant with home exercise program.     Pain Scale: 6/10 pain level  Pain Location: left wrist    Objective   Gardenia received therapeutic exercises to develop strength, ROM, and flexibility for left wrist and hand:   -Patient performed L wrist flexion using red theraputty   -Pt performed bilateral wrist flexion/extension using 3# dowel ludmila for 3 x 10 reps  -Pt performed L hand intrinsic  strengthening using red theraputty  -Pt performed L forearm pronation/supination using 3# wt for 3 x 10 reps  -Pt performed L hand fine motor strengthening , picking up 1 inch pegs   -Pt performed L hand fine motor activty, picking up small objects from the table     Gardenia received the following manual therapy techniques:  -Soft tissue massage was applied to the L hand and wrist for 10 mins in order to alleviate pain     Gardenia received the following direct contact modalities after being cleared for contraindications:   -Paraffin was applied to left wrist/hand for 10 minutes in effort to alleviate pain and increase blood flow.  Home Exercises and Education Provided     Education provided re:   - progress towards goals   - role of therapy in multi - disciplinary team, goals for therapy  Pt educated on condition, POC, and expectations in therapy.  No spiritual or educational barriers to learning provided    Home exercises:  Pt " will be provided HEP during course of treatment with progressions as appropriate. Pt was advised to perform these exercises free of pain, and to stop performing them if pain occurs.   Gardenia demonstrated good  understanding of the education provided.     Assessment   Gardenia is progressing well towards her goals and no updates to goals at this time.   Pt performed fine motor assessment today using the L hand;     48 seconds to fill one row using L hand     Pt prognosis is Good. Pt will continue to benefit from skilled outpatient occupational therapy to address the deficits listed in the problem list chart on initial evaluation, provide pt/family education and to maximize pt's level of independence in the home and community environment.     Medical necessity is demonstrated by the impairments and functional limitations listed on the Initial Evaluation.     Anticipated barriers to occupational therapy: None  Pt's spiritual, cultural and educational needs considered and pt agreeable to plan of care and goals.    Goals   Patient will report no pain when performing functional or therapeutic tasks prior to discharge.  Patient will exhibit a MMT grade of 5/5 at left wrist prior to discharge.  Patient will exhibit 20 pounds of force with left hand when using dynamometer prior to discharge.      Plan   OT to extend plan of care to 12/12/2022 in order to continue improving strength and range of motion as well as decrease scar tissue build up in the L wrist. Continue with established Plan of Care towards Occupational Therapy goals.   Discussed Plan of Care with patient: yM Pascal, OT  11/21/2022

## 2022-11-28 ENCOUNTER — CLINICAL SUPPORT (OUTPATIENT)
Dept: REHABILITATION | Facility: HOSPITAL | Age: 82
End: 2022-11-28
Payer: MEDICARE

## 2022-11-28 DIAGNOSIS — G56.01 CARPAL TUNNEL SYNDROME OF RIGHT WRIST: ICD-10-CM

## 2022-11-28 DIAGNOSIS — G56.02 CARPAL TUNNEL SYNDROME OF LEFT WRIST: ICD-10-CM

## 2022-11-28 PROCEDURE — 97035 APP MDLTY 1+ULTRASOUND EA 15: CPT | Mod: PN

## 2022-11-28 PROCEDURE — 97110 THERAPEUTIC EXERCISES: CPT | Mod: PN

## 2022-11-28 PROCEDURE — 97140 MANUAL THERAPY 1/> REGIONS: CPT | Mod: PN

## 2022-11-28 NOTE — PROGRESS NOTES
"                            Occupational Therapy Daily Treatment Note   Name: Gardenia Dent 1940  MRN: [id number]    Visit Date: 11/28/2022  Visit #: 17 / 20  Authorization period Expiration: 12/31/2022    Plan of Care Expiration: 12/12/2022  Precautions: No heavy pushing or pulling with LUE    Time In: 11: 00  Time Out: 11: 45  Total 1:1 Treatment Time:  45 min    Treatment Diagnosis:   Encounter Diagnoses   Name Primary?    Carpal tunnel syndrome of right wrist     Carpal tunnel syndrome of left wrist        Physician: Aundrea Madrigal MD    Subjective   Pt reports: " not hurting too much at night "    She was compliant with home exercise program.     Pain Scale:  3/10 pain level  Pain Location: left wrist    Objective   Gardenia received therapeutic exercises to develop strength, ROM, and flexibility for left wrist and hand:   -Pt performed L forearm pronation/supination using 2# wt for 3 x 10 reps  -Pt performed L hand finger curls for 10 reps  -Pt performed L hand finger lifts for 10 reps     Gardenia received the following manual therapy techniques:  -Soft tissue massage was applied to the L hand and wrist for 10 mins in order to alleviate pain     Gardenia received the following direct contact modalities after being cleared for contraindications:   -Ultrasound was applied to left (ulnar side) for 10 minutes @ 1.4 cm/w2  in effort to alleviate pain and increase blood flow and decrease edema .  Home Exercises and Education Provided     Education provided re:   - progress towards goals   - role of therapy in multi - disciplinary team, goals for therapy  Pt educated on condition, POC, and expectations in therapy.  No spiritual or educational barriers to learning provided    Home exercises:  Pt will be provided HEP during course of treatment with progressions as appropriate. Pt was advised to perform these exercises free of pain, and to stop performing them if pain occurs.   Gardenia demonstrated good  understanding of " the education provided.     Assessment   Gardenia is progressing well towards her goals and no updates to goals at this time.   Pt performed fine motor assessment today using the L hand;     4. 7 cm in the R wrist  4.9 cm in the L wrist     Pt prognosis is Good. Pt will continue to benefit from skilled outpatient occupational therapy to address the deficits listed in the problem list chart on initial evaluation, provide pt/family education and to maximize pt's level of independence in the home and community environment.     Medical necessity is demonstrated by the impairments and functional limitations listed on the Initial Evaluation.     Anticipated barriers to occupational therapy: None  Pt's spiritual, cultural and educational needs considered and pt agreeable to plan of care and goals.    Goals   Patient will report no pain when performing functional or therapeutic tasks prior to discharge.  Patient will exhibit a MMT grade of 5/5 at left wrist prior to discharge.  Patient will exhibit 20 pounds of force with left hand when using dynamometer prior to discharge.      Plan   OT to extend plan of care to 12/12/2022 in order to continue improving strength and range of motion as well as decrease scar tissue build up in the L wrist. Continue with established Plan of Care towards Occupational Therapy goals.   Discussed Plan of Care with patient: Yes    Sole Pascal, OT  11/28/2022

## 2022-12-01 ENCOUNTER — CLINICAL SUPPORT (OUTPATIENT)
Dept: REHABILITATION | Facility: HOSPITAL | Age: 82
End: 2022-12-01
Payer: MEDICARE

## 2022-12-01 ENCOUNTER — OFFICE VISIT (OUTPATIENT)
Dept: PODIATRY | Facility: CLINIC | Age: 82
End: 2022-12-01
Payer: MEDICARE

## 2022-12-01 VITALS
WEIGHT: 165 LBS | HEART RATE: 81 BPM | SYSTOLIC BLOOD PRESSURE: 123 MMHG | BODY MASS INDEX: 27.49 KG/M2 | DIASTOLIC BLOOD PRESSURE: 78 MMHG | HEIGHT: 65 IN | RESPIRATION RATE: 16 BRPM

## 2022-12-01 DIAGNOSIS — E11.9 TYPE II DIABETES MELLITUS, WELL CONTROLLED: Primary | ICD-10-CM

## 2022-12-01 DIAGNOSIS — M20.42 HAMMER TOES OF BOTH FEET: ICD-10-CM

## 2022-12-01 DIAGNOSIS — M20.11 HALLUX ABDUCTO VALGUS, RIGHT: ICD-10-CM

## 2022-12-01 DIAGNOSIS — G56.02 CARPAL TUNNEL SYNDROME ON LEFT: Primary | ICD-10-CM

## 2022-12-01 DIAGNOSIS — M20.41 HAMMER TOES OF BOTH FEET: ICD-10-CM

## 2022-12-01 PROCEDURE — 99215 OFFICE O/P EST HI 40 MIN: CPT | Mod: PBBFAC | Performed by: PODIATRIST

## 2022-12-01 PROCEDURE — 97110 THERAPEUTIC EXERCISES: CPT | Mod: PN

## 2022-12-01 PROCEDURE — 97140 MANUAL THERAPY 1/> REGIONS: CPT | Mod: PN

## 2022-12-01 PROCEDURE — 99999 PR PBB SHADOW E&M-EST. PATIENT-LVL V: ICD-10-PCS | Mod: PBBFAC,,, | Performed by: PODIATRIST

## 2022-12-01 PROCEDURE — 99213 PR OFFICE/OUTPT VISIT, EST, LEVL III, 20-29 MIN: ICD-10-PCS | Mod: S$PBB,,, | Performed by: PODIATRIST

## 2022-12-01 PROCEDURE — 99213 OFFICE O/P EST LOW 20 MIN: CPT | Mod: S$PBB,,, | Performed by: PODIATRIST

## 2022-12-01 PROCEDURE — 99999 PR PBB SHADOW E&M-EST. PATIENT-LVL V: CPT | Mod: PBBFAC,,, | Performed by: PODIATRIST

## 2022-12-01 RX ORDER — ALPRAZOLAM 0.25 MG/1
TABLET ORAL
COMMUNITY
Start: 2022-11-03 | End: 2023-09-17 | Stop reason: SDUPTHER

## 2022-12-01 RX ORDER — LEVOTHYROXINE SODIUM 75 UG/1
TABLET ORAL
COMMUNITY
Start: 2022-11-13 | End: 2022-12-03 | Stop reason: SDUPTHER

## 2022-12-01 RX ORDER — ALPRAZOLAM 0.25 MG/1
0.25 TABLET ORAL
COMMUNITY
Start: 2022-11-03 | End: 2022-12-03 | Stop reason: SDUPTHER

## 2022-12-01 RX ORDER — OXYCODONE HYDROCHLORIDE 5 MG/1
5 TABLET ORAL EVERY 8 HOURS PRN
COMMUNITY
Start: 2022-09-08 | End: 2023-09-17

## 2022-12-01 RX ORDER — ONDANSETRON 4 MG/1
4 TABLET, ORALLY DISINTEGRATING ORAL EVERY 8 HOURS PRN
COMMUNITY
Start: 2022-09-08 | End: 2023-09-17

## 2022-12-01 RX ORDER — CEPHALEXIN 500 MG/1
500 CAPSULE ORAL 4 TIMES DAILY
COMMUNITY
Start: 2022-09-08 | End: 2023-09-17

## 2022-12-01 NOTE — PROGRESS NOTES
"                            Occupational Therapy Daily Treatment Note   Name: Gardenia Dent 1940  MRN: [id number]    Visit Date: 12/1/2022  Visit #: 18 / 20  Authorization period Expiration: 12/31/2022    Plan of Care Expiration: 12/12/2022  Precautions: No heavy pushing or pulling with LUE    Time In: 11: 00  Time Out: 11: 45  Total 1:1 Treatment Time:  45 min    Treatment Diagnosis:   No diagnosis found.      Physician: Aundrea Madrigal, PhD    Subjective   Pt reports: " its been hurting for the past couple days "    She was compliant with home exercise program.     Pain Scale:  3/10 pain level  Pain Location: left wrist    Objective   Gardenia received therapeutic exercises to develop strength, ROM, and flexibility for left wrist and hand:   -Pt performed L hand composite flexion  L hand adduction 10 reps  L hand abduction 10 reps  L thumb circles 10 reps  L thumb abduction 10 reps  L thumb flexion/extension 10 reps  L thumb resisted extension 3 reps     Gardenia received the following manual therapy techniques:  -Soft tissue massage was applied to the L hand and wrist for 10 mins in order to alleviate pain   -Gentle PROM was applied to the L hand /wrist for 10 mins in order to improve joint mobility     Home Exercises and Education Provided     Education provided re:   - progress towards goals   - role of therapy in multi - disciplinary team, goals for therapy  Pt educated on condition, POC, and expectations in therapy.  No spiritual or educational barriers to learning provided    Home exercises:  Pt will be provided HEP during course of treatment with progressions as appropriate. Pt was advised to perform these exercises free of pain, and to stop performing them if pain occurs.   Gardenia demonstrated good  understanding of the education provided.     Assessment   Gardenia is progressing well towards her goals and no updates to goals at this time.   Pt performed fine motor assessment today using the L hand;     4. 7 " cm in the R wrist  4.9 cm in the L wrist     Pt prognosis is Good. Pt will continue to benefit from skilled outpatient occupational therapy to address the deficits listed in the problem list chart on initial evaluation, provide pt/family education and to maximize pt's level of independence in the home and community environment.     Medical necessity is demonstrated by the impairments and functional limitations listed on the Initial Evaluation.     Anticipated barriers to occupational therapy: None  Pt's spiritual, cultural and educational needs considered and pt agreeable to plan of care and goals.    Goals   Patient will report no pain when performing functional or therapeutic tasks prior to discharge.  Patient will exhibit a MMT grade of 5/5 at left wrist prior to discharge.  Patient will exhibit 20 pounds of force with left hand when using dynamometer prior to discharge.      Plan   OT to extend plan of care to 12/12/2022 in order to continue improving strength and range of motion as well as decrease scar tissue build up in the L wrist. Continue with established Plan of Care towards Occupational Therapy goals.   Discussed Plan of Care with patient: Yes    Sole Pascal, OT  12/1/2022

## 2022-12-04 NOTE — PROGRESS NOTES
Subjective:       Patient ID: Gardenia Dent is a 82 y.o. female.    Chief Complaint: Follow-up, Nail Problem, Callouses, Hammer Toe, and Skin Problem  Patient presents for follow up due to diabetes, arthritis, bunion right, hammertoes, neuropathy, nail problem.  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  Relates her feet have been doing well, pain-free, no longer taking Cymbalta, admits she has been working care of herself to take care of her .  Checks her glucose occasionally.  Does state it is well-controlled      Past Medical History:   Diagnosis Date    Anxiety     Asthma     Depression     Dyslipidemia     GERD (gastroesophageal reflux disease)     Hypothyroidism     Lumbar disc disease with radiculopathy     Type 2 diabetes mellitus 03/23/2021     Past Surgical History:   Procedure Laterality Date    HAND SURGERY      HIP SURGERY      HYSTERECTOMY      SINUS SURGERY      TOTAL KNEE ARTHROPLASTY      WRIST SURGERY       Family History   Problem Relation Age of Onset    Cancer Mother     Diabetes type II Mother     Heart disease Father     Heart disease Brother     Diabetes Brother     Diabetes type II Brother      Social History     Socioeconomic History    Marital status:    Tobacco Use    Smoking status: Never    Smokeless tobacco: Never   Substance and Sexual Activity    Alcohol use: No    Drug use: No    Sexual activity: Never       Current Outpatient Medications   Medication Sig Dispense Refill    ALPRAZolam (XANAX) 0.25 MG tablet TAKE ONE TABLET BY MOUTH TWICE DAILY AS NEEDED FOR ANXIETY THANK YOU!      ascorbic Acid (VITAMIN C) 500 mg CpSR Vitamin C 500 mg capsule,extended release   Take 1 capsule every day by oral route.      blood glucose control, high Soln   one touch verio reflect test strips, See Instructions, use in glucose meter to check  bs daily, # 50 EA, 3 Refill(s), Pharmacy: [facility], 165.1, cm, 08/02/22 13:28:00 CDT, Height/Length Measured, 77.8, kg, 08/02/22 13:28:00 CD...      bromelains (BROMELAIN MISC) Bromelain      calcium-vitamin D3 (OS-ANITA 500 + D3) 500 mg-5 mcg (200 unit) per tablet Calcium 500 + D      diphenoxylate-atropine 2.5-0.025 mg (LOMOTIL) 2.5-0.025 mg per tablet   1 tab, Oral, QID, # 50 tab, 1 Refill(s), Pharmacy: [facility]/pharmacy #[id number], Chronic diarrhea of unknown origin, 165.1, cm, 06/28/22 13:29:00 CDT, Height/Length Measured, 76.5, kg, 06/28/22 13:29:00 CDT, Weight Dosing      EScitalopram oxalate (LEXAPRO) 5 MG Tab 5 mg.      famotidine (PEPCID) 40 MG tablet Take 40 mg by mouth once daily.      ginger, Zingiber officinalis, 250 mg Cap Ginger      levothyroxine (SYNTHROID) 75 MCG tablet   = 1 tab, Oral, Daily, # 90 tab, 3 Refill(s), Pharmacy: [facility], 165.1, cm, 11/22/21 11:29:00 CST, Height/Length Measured, 73, kg, 11/22/21 11:29:00 CST, Weight Dosing      loratadine-pseudoephedrine  mg (CLARITIN-D 24 HOUR)  mg per 24 hr tablet   1 tab, Oral, Daily, PRN as needed for congestion, # 30 tab, 1 Refill(s), Pharmacy: [facility] #[id number], 165.1, cm, 09/10/20 9:52:00 CDT, Height/Length Measured, 76.8, kg, 09/10/20 9:52:00 CDT, Weight Dosing      magnesium oxide 500 mg Tab 500 mg.      multivit with minerals/lutein (MULTIVITAMIN 50 PLUS ORAL) multivitamin      omeprazole (PRILOSEC) 40 MG capsule 40 mg.      pantoprazole (PROTONIX) 40 MG tablet       rosuvastatin (CRESTOR) 5 MG tablet   See Instructions, TAKE 1 TABLET AT BEDTIME, # 90 tab, 3 Refill(s), Pharmacy: [facility] #[id number], 165.1, cm, 06/05/20 11:18:00 CDT, Height/Length Measured, Weight Dosing      triamcinolone acetonide 0.1% (KENALOG) 0.1 % cream Apply topically 2 (two) times daily.      turmeric, bulk, 100 % Powd turmeric (bulk)      zinc acetate 50 mg (zinc) Cap 50 mg.      cephALEXin (KEFLEX) 500 MG capsule Take 500 mg by mouth 4 (four) times daily.       "DULoxetine (CYMBALTA) 20 MG capsule Take 1 capsule (20 mg total) by mouth 2 (two) times daily. 180 capsule 0    medical supply, miscellaneous (WALKER TIPS MISC)   walker with wheels, See Instructions, gait insstability, # 1 EA, 0 Refill(s)      ondansetron (ZOFRAN-ODT) 4 MG TbDL Take 4 mg by mouth every 8 (eight) hours as needed.      oxyCODONE (ROXICODONE) 5 MG immediate release tablet Take 5 mg by mouth every 8 (eight) hours as needed.      traMADoL (ULTRAM) 50 mg tablet   See Instructions, prn pain1 po tid-- discontinue alprazolam, # 30 tab, 3 Refill(s), Maintenance, Pharmacy: [facility], 165.1, cm, 08/02/22 13:28:00 CDT, Height/Length Measured, 77.8, kg, 08/02/22 13:28:00 CDT, Weight Dosing       No current facility-administered medications for this visit.     Review of patient's allergies indicates:   Allergen Reactions    Bacitracin Itching    Gentamicin     Solifenacin     Sulfa (sulfonamide antibiotics)        Review of Systems   HENT:  Negative for congestion.    Respiratory:  Negative for cough.    Cardiovascular:  Negative for leg swelling.   Musculoskeletal:  Negative for gait problem.   All other systems reviewed and are negative.    Objective:      Vitals:    12/01/22 1330   BP: 123/78   Pulse: 81   Resp: 16   Weight: 74.8 kg (165 lb)   Height: 5' 5" (1.651 m)     Physical Exam  Vitals and nursing note reviewed.   Constitutional:       General: She is not in acute distress.     Appearance: Normal appearance.   Cardiovascular:      Pulses:           Dorsalis pedis pulses are 2+ on the right side and 2+ on the left side.        Posterior tibial pulses are 1+ on the right side and 1+ on the left side.   Pulmonary:      Effort: Pulmonary effort is normal.   Musculoskeletal:         General: Normal range of motion.      Right foot: Deformity present.      Left foot: Deformity (Hammertoes bilateral, 2nd digit right most severe) and bunion present.   Feet:      Right foot:      Skin integrity: Callus and " dry skin present. No skin breakdown.      Toenail Condition: Right toenails are long.      Left foot:      Skin integrity: Dry skin (dry skin heels) present. No skin breakdown.      Toenail Condition: Left toenails are long.   Skin:     Capillary Refill: Capillary refill takes 2 to 3 seconds.   Neurological:      Mental Status: She is alert.   Psychiatric:         Mood and Affect: Mood normal.         Behavior: Behavior normal.         Thought Content: Thought content normal.         Judgment: Judgment normal.                                            Assessment:       1. Type II diabetes mellitus, well controlled    2. Hallux abducto valgus, right    3. Hammer toes of both feet - Right Foot          Plan:         Reviewed diabetic education and foot care   Reviewed hammertoes, arthritis, bunion especially right foot  Reviewed potential complications regarding this area due to friction/condition of skin  Reviewed care of dry skin on heels   Reviewed wide, light appropriate tennis shoes,  especially indoors to protect feet, no flat shoes, slippers or walking in sock or bare feet.    Discussed maintenance of skin and nails and potential complications. Nails debrided. No concerns at this time    Reviewed need for daily foot checks and instructed patient to contact the office with any area of redness or swelling which has not improved within 3 days.  Patient was in understanding and agreement with treatment plan.  I counseled the patient on their conditions, implications and medical management.  Instructed patient to contact the office with any changes, questions, concerns, worsening of symptoms.   Total face to face time, exam, assessment, treatment, discussion, documentation 20 minutes, more than half this time spent on consultation and coordination of care.   Follow up prn 3 months    This note was created using M*Modal voice recognition software that occasionally misinterpreted phrases or words.

## 2022-12-05 ENCOUNTER — CLINICAL SUPPORT (OUTPATIENT)
Dept: REHABILITATION | Facility: HOSPITAL | Age: 82
End: 2022-12-05
Payer: MEDICARE

## 2022-12-05 DIAGNOSIS — G56.02 CARPAL TUNNEL SYNDROME ON LEFT: Primary | ICD-10-CM

## 2022-12-05 PROCEDURE — 97140 MANUAL THERAPY 1/> REGIONS: CPT | Mod: PN

## 2022-12-05 PROCEDURE — 97035 APP MDLTY 1+ULTRASOUND EA 15: CPT | Mod: PN

## 2022-12-05 PROCEDURE — 97110 THERAPEUTIC EXERCISES: CPT | Mod: PN

## 2022-12-05 NOTE — PROGRESS NOTES
"                            Occupational Therapy Daily Treatment Note   Name: Gardenia Dent 1940  MRN: [id number]    Visit Date: 12/5/2022  Visit #: 18 / 20  Authorization period Expiration: 12/31/2022    Plan of Care Expiration: 12/12/2022  Precautions: No heavy pushing or pulling with LUE    Time In: 11: 00  Time Out: 11: 35  Total 1:1 Treatment Time:  35 min    Treatment Diagnosis:   Encounter Diagnosis   Name Primary?    Carpal tunnel syndrome on left Yes         Physician: Aundrea Madrigal MD    Subjective   Pt reports: " its been hurting for the past couple days "    She was compliant with home exercise program.     Pain Scale:  3/10 pain level  Pain Location: left wrist    Objective   Gardenia received therapeutic exercises to develop strength, ROM, and flexibility for left wrist and hand:   -Pt performed B wrist strengthening flex bar (yellow) exercises for 10 mins    Gardenia received the following manual therapy techniques for 15 mins:  -Soft tissue massage was applied to the L hand and wrist in order to alleviate pain   -Gentle PROM was applied to the L hand /wrist in order to improve joint mobility     Gardenia received the following direct contact modalities :   -Pt received US to the L hand /wrist @ 1.3 w/cm2 for 10 mins     Home Exercises and Education Provided     Education provided re:   - progress towards goals   - role of therapy in multi - disciplinary team, goals for therapy  Pt educated on condition, POC, and expectations in therapy.  No spiritual or educational barriers to learning provided    Home exercises:  Pt will be provided HEP during course of treatment with progressions as appropriate. Pt was advised to perform these exercises free of pain, and to stop performing them if pain occurs.   Gardenia demonstrated good  understanding of the education provided.     Assessment   Gardenia is progressing well towards her goals and no updates to goals at this time.   Pt performed fine motor assessment " today using the L hand;     4. 7 cm in the R wrist  4.9 cm in the L wrist     Pt prognosis is Good. Pt will continue to benefit from skilled outpatient occupational therapy to address the deficits listed in the problem list chart on initial evaluation, provide pt/family education and to maximize pt's level of independence in the home and community environment.     Medical necessity is demonstrated by the impairments and functional limitations listed on the Initial Evaluation.     Anticipated barriers to occupational therapy: None  Pt's spiritual, cultural and educational needs considered and pt agreeable to plan of care and goals.    Goals   Patient will report no pain when performing functional or therapeutic tasks prior to discharge.  Patient will exhibit a MMT grade of 5/5 at left wrist prior to discharge.  Patient will exhibit 20 pounds of force with left hand when using dynamometer prior to discharge.      Plan   OT to extend plan of care to 12/12/2022 in order to continue improving strength and range of motion as well as decrease scar tissue build up in the L wrist. Continue with established Plan of Care towards Occupational Therapy goals.   Discussed Plan of Care with patient: Yes    Sole Pascal, OT  12/5/2022

## 2022-12-08 ENCOUNTER — CLINICAL SUPPORT (OUTPATIENT)
Dept: REHABILITATION | Facility: HOSPITAL | Age: 82
End: 2022-12-08
Payer: MEDICARE

## 2022-12-08 DIAGNOSIS — G56.02 CARPAL TUNNEL SYNDROME OF LEFT WRIST: Primary | ICD-10-CM

## 2022-12-08 PROCEDURE — 97110 THERAPEUTIC EXERCISES: CPT | Mod: PN

## 2022-12-08 PROCEDURE — 97018 PARAFFIN BATH THERAPY: CPT | Mod: PN

## 2022-12-08 PROCEDURE — 97140 MANUAL THERAPY 1/> REGIONS: CPT | Mod: PN

## 2022-12-08 NOTE — PROGRESS NOTES
Occupational Therapy Discharge Summary    Name: Gardenia Dent 1940  MRN: [id number]   Date: 12/8/2022  Principal Problem: L wrist carpal tunnel syndrome     Subjective:  Patient Discharged from acute Occupational Therapy on 12/8/2022.  Please refer to prior OT noted date on 12/5/2022 for functional status.    Objective: Pt performed therapeutic exercises for strengthening and range of motion. Pt received modalities including ultrasound, heat pack/cold pack with no adverse affects reported.  Pt received manual therapy techniques including gentle PROM and soft tissue mobilization to help decrease pain levels.   GOALS:     Patient will report no pain when performing functional or therapeutic tasks prior to discharge.  Patient will exhibit a MMT grade of 5/5 at left wrist prior to discharge.  Patient will exhibit 20 pounds of force with left hand when using dynamometer prior to discharge      All goals met    Assessment:  Patient has met all goals and is not appropriate for therapy.    Reasons for Discontinuation of Therapy Services  Satisfactory goal achievement.      Plan:  Patient Discharged to: Home with no HH needed.    Sole Pascal OT

## 2023-03-17 ENCOUNTER — OFFICE VISIT (OUTPATIENT)
Dept: PODIATRY | Facility: CLINIC | Age: 83
End: 2023-03-17
Payer: MEDICARE

## 2023-03-17 VITALS
WEIGHT: 165 LBS | DIASTOLIC BLOOD PRESSURE: 74 MMHG | HEART RATE: 78 BPM | BODY MASS INDEX: 27.49 KG/M2 | SYSTOLIC BLOOD PRESSURE: 121 MMHG | HEIGHT: 65 IN | RESPIRATION RATE: 16 BRPM

## 2023-03-17 DIAGNOSIS — M20.41 HAMMER TOES OF BOTH FEET: ICD-10-CM

## 2023-03-17 DIAGNOSIS — E11.9 COMPREHENSIVE DIABETIC FOOT EXAMINATION, TYPE 2 DM, ENCOUNTER FOR: Primary | ICD-10-CM

## 2023-03-17 DIAGNOSIS — E11.9 TYPE II DIABETES MELLITUS, WELL CONTROLLED: ICD-10-CM

## 2023-03-17 DIAGNOSIS — M20.42 HAMMER TOES OF BOTH FEET: ICD-10-CM

## 2023-03-17 DIAGNOSIS — M20.11 HALLUX ABDUCTO VALGUS, RIGHT: ICD-10-CM

## 2023-03-17 PROCEDURE — 99999 PR PBB SHADOW E&M-EST. PATIENT-LVL V: ICD-10-PCS | Mod: PBBFAC,,, | Performed by: PODIATRIST

## 2023-03-17 PROCEDURE — 99213 PR OFFICE/OUTPT VISIT, EST, LEVL III, 20-29 MIN: ICD-10-PCS | Mod: S$PBB,,, | Performed by: PODIATRIST

## 2023-03-17 PROCEDURE — 99213 OFFICE O/P EST LOW 20 MIN: CPT | Mod: S$PBB,,, | Performed by: PODIATRIST

## 2023-03-17 PROCEDURE — 99215 OFFICE O/P EST HI 40 MIN: CPT | Mod: PBBFAC | Performed by: PODIATRIST

## 2023-03-17 PROCEDURE — 99999 PR PBB SHADOW E&M-EST. PATIENT-LVL V: CPT | Mod: PBBFAC,,, | Performed by: PODIATRIST

## 2023-03-17 RX ORDER — ESCITALOPRAM OXALATE 10 MG/1
10 TABLET ORAL
COMMUNITY
Start: 2023-03-09 | End: 2023-03-19 | Stop reason: SDUPTHER

## 2023-03-17 RX ORDER — ALPRAZOLAM 0.25 MG/1
0.25 TABLET ORAL
COMMUNITY
Start: 2023-03-09 | End: 2023-03-19 | Stop reason: SDUPTHER

## 2023-03-17 RX ORDER — ESCITALOPRAM OXALATE 10 MG/1
10 TABLET ORAL
COMMUNITY
Start: 2023-03-09 | End: 2023-09-17 | Stop reason: SDUPTHER

## 2023-03-19 NOTE — PROGRESS NOTES
Subjective:       Patient ID: Gardenia Dent is a 83 y.o. female.    Chief Complaint: Follow-up and Diabetic Foot Exam    Patient presents for annual diabetic foot exam, follow up arthritis, bunion right, hammertoes, neuropathy, nail problem.  Relates she has been doing well, no significant changes in health since last visit.  Has been sadly past 2 way after long illness.  She relates diabetes has remained well controlled with an A1c of 6.4.      Past Medical History:   Diagnosis Date    Anxiety     Asthma     Depression     Dyslipidemia     GERD (gastroesophageal reflux disease)     Hypothyroidism     Lumbar disc disease with radiculopathy     Type 2 diabetes mellitus 03/23/2021     Past Surgical History:   Procedure Laterality Date    HAND SURGERY      HIP SURGERY      HYSTERECTOMY      SINUS SURGERY      TOTAL KNEE ARTHROPLASTY      WRIST SURGERY       Family History   Problem Relation Age of Onset    Cancer Mother     Diabetes type II Mother     Heart disease Father     Heart disease Brother     Diabetes Brother     Diabetes type II Brother      Social History     Socioeconomic History    Marital status:    Tobacco Use    Smoking status: Never    Smokeless tobacco: Never   Substance and Sexual Activity    Alcohol use: No    Drug use: No    Sexual activity: Never       Current Outpatient Medications   Medication Sig Dispense Refill    ascorbic Acid (VITAMIN C) 500 mg CpSR Vitamin C 500 mg capsule,extended release   Take 1 capsule every day by oral route.      blood glucose control, high Soln   one touch verio reflect test strips, See Instructions, use in glucose meter to check  bs daily, # 50 EA, 3 Refill(s), [facility], 165.1, cm, 08/02/22 13:28:00 CDT, Height/Length Measured, 77.8, kg, 08/02/22 13:28:00 CD...      blood sugar diagnostic Strp   one touch verio reflect test strips, See Instructions, use in glucose meter to check  bs daily, # 50 EA, 3 Refill(s), Pharmacy:  [facility], 165, cm, 03/09/23 13:09:00 CST, Height/Length Measured, 74.1, kg, 03/09/23 13:09:00 CST, Weight Dosing      bromelains (BROMELAIN MISC) Bromelain      calcium-vitamin D3 (OS-ANITA 500 + D3) 500 mg-5 mcg (200 unit) per tablet Calcium 500 + D      diphenoxylate-atropine 2.5-0.025 mg (LOMOTIL) 2.5-0.025 mg per tablet   1 tab, Oral, QID, # 50 tab, 1 Refill(s), Pharmacy: [facility]/pharmacy #[id number], Chronic diarrhea of unknown origin, 165.1, cm, 06/28/22 13:29:00 CDT, Height/Length Measured, 76.5, kg, 06/28/22 13:29:00 CDT, Weight Dosing      famotidine (PEPCID) 40 MG tablet Take 40 mg by mouth once daily.      ginger, Zingiber officinalis, 250 mg Cap Ginger      levothyroxine (SYNTHROID) 75 MCG tablet   = 1 tab, Oral, Daily, # 90 tab, 3 Refill(s), Pharmacy: [facility], 165.1, cm, 11/22/21 11:29:00 CST, Height/Length Measured, 73, kg, 11/22/21 11:29:00 CST, Weight Dosing      loratadine-pseudoephedrine  mg (CLARITIN-D 24 HOUR)  mg per 24 hr tablet   1 tab, Oral, Daily, PRN as needed for congestion, # 30 tab, 1 Refill(s), [id number], 165.1, cm, 09/10/20 9:52:00 CDT, Height/Length Measured, 76.8, kg, 09/10/20 9:52:00 CDT, Weight Dosing      magnesium oxide 500 mg Tab 500 mg.      medical supply, miscellaneous (WALKER TIPS MISC)   walker with wheels, See Instructions, gait insstability, # 1 EA, 0 Refill(s)      multivit with minerals/lutein (MULTIVITAMIN 50 PLUS ORAL) multivitamin      omeprazole (PRILOSEC) 40 MG capsule 40 mg.      ondansetron (ZOFRAN-ODT) 4 MG TbDL Take 4 mg by mouth every 8 (eight) hours as needed.      pantoprazole (PROTONIX) 40 MG tablet       rosuvastatin (CRESTOR) 5 MG tablet   See Instructions, TAKE 1 TABLET AT BEDTIME, # 90 tab, 3 Refill(s), Pharmacy: [facility] #[id number], 165.1, cm, 06/05/20 11:18:00 CDT, Height/Length Measured, Weight Dosing      triamcinolone acetonide 0.1% (KENALOG) 0.1 % cream Apply topically 2 (two)  "times daily.      turmeric, bulk, 100 % Powd turmeric (bulk)      zinc acetate 50 mg (zinc) Cap 50 mg.      ALPRAZolam (XANAX) 0.25 MG tablet TAKE ONE TABLET BY MOUTH TWICE DAILY AS NEEDED FOR ANXIETY THANK YOU!      cephALEXin (KEFLEX) 500 MG capsule Take 500 mg by mouth 4 (four) times daily.      DULoxetine (CYMBALTA) 20 MG capsule Take 1 capsule (20 mg total) by mouth 2 (two) times daily. 180 capsule 0    EScitalopram oxalate (LEXAPRO) 10 MG tablet 10 mg.      EScitalopram oxalate (LEXAPRO) 5 MG Tab 5 mg.      oxyCODONE (ROXICODONE) 5 MG immediate release tablet Take 5 mg by mouth every 8 (eight) hours as needed.      traMADoL (ULTRAM) 50 mg tablet   See Instructions, prn pain1 po tid-- discontinue alprazolam, # 30 tab, 3 Refill(s), Maintenance, Pharmacy: [facility], 165.1, cm, 08/02/22 13:28:00 CDT, Height/Length Measured, 77.8, kg, 08/02/22 13:28:00 CDT, Weight Dosing       No current facility-administered medications for this visit.     Review of patient's allergies indicates:   Allergen Reactions    Bacitracin Itching    Gentamicin     Solifenacin     Sulfa (sulfonamide antibiotics)        Review of Systems   HENT:  Negative for congestion.    Respiratory:  Negative for cough.    Cardiovascular:  Negative for leg swelling.   Musculoskeletal:  Negative for gait problem.   All other systems reviewed and are negative.    Objective:      Vitals:    03/17/23 0934   BP: 121/74   Pulse: 78   Resp: 16   Weight: 74.8 kg (165 lb)   Height: 5' 5" (1.651 m)     Physical Exam  Vitals and nursing note reviewed.   Constitutional:       General: She is not in acute distress.     Appearance: Normal appearance.   Cardiovascular:      Pulses:           Dorsalis pedis pulses are 2+ on the right side and 2+ on the left side.        Posterior tibial pulses are 1+ on the right side and 1+ on the left side.   Pulmonary:      Effort: Pulmonary effort is normal.   Musculoskeletal:         General: Normal range of motion.      " Right foot: Deformity present.      Left foot: Deformity (Hammertoes bilateral, 2nd digit right most severe) and bunion present.   Feet:      Right foot:      Protective Sensation: 6 sites tested.  6 sites sensed.      Skin integrity: Callus (minimal callus medial 1st MPJ right) present. No skin breakdown.      Toenail Condition: Right toenails are long.      Left foot:      Protective Sensation: 6 sites tested.  6 sites sensed.      Skin integrity: No skin breakdown.      Toenail Condition: Left toenails are long.   Skin:     Capillary Refill: Capillary refill takes 2 to 3 seconds.   Neurological:      Mental Status: She is alert.      Comments: Sensation intact all areas bilateral feet.  No pain/paresthesias feet   Psychiatric:         Mood and Affect: Mood normal.         Behavior: Behavior normal.         Thought Content: Thought content normal.         Judgment: Judgment normal.                        Assessment:       1. Comprehensive diabetic foot examination, type 2 DM, encounter for    2. Type II diabetes mellitus, well controlled    3. Hallux abducto valgus, right    4. Hammer toes of both feet - Right Foot          Plan:           Comprehensive diabetic pedal exam performed    Reviewed diabetic education  Reviewed neuropathy   Reviewed benefit of controled glucose/diabetes regarding potential foot problems  Reviewed hammertoes, arthritis, bunions, potential complications regarding areas due to friction  Reviewed wide, light appropriate tennis shoes,  especially indoors to protect feet, no flat shoes, slippers or walking in sock or bare feet.    Discussed maintenance of skin and nails and potential complications. Nails debrided. No concerns at this time    Reviewed need for daily foot checks and instructed patient to contact the office with any area of redness or swelling which has not improved within 3 days.  Patient was in understanding and agreement with treatment plan.  I counseled the patient on their  conditions, implications and medical management.  Instructed patient/family to contact the office with any changes, questions, concerns, worsening of symptoms.   Total face to face time 20 minutes, exam, assessment, treatment, discussion, additional time for review of chart prior to and following appointment and visit documentation, consultation and coordination of care.   Follow up prn 3 months    This note was created using [employer] voice recognition software that occasionally misinterpreted phrases or words.

## 2023-06-15 ENCOUNTER — OFFICE VISIT (OUTPATIENT)
Dept: PODIATRY | Facility: CLINIC | Age: 83
End: 2023-06-15
Payer: MEDICARE

## 2023-06-15 VITALS
HEART RATE: 96 BPM | BODY MASS INDEX: 27.71 KG/M2 | WEIGHT: 166.31 LBS | DIASTOLIC BLOOD PRESSURE: 69 MMHG | RESPIRATION RATE: 16 BRPM | HEIGHT: 65 IN | SYSTOLIC BLOOD PRESSURE: 106 MMHG

## 2023-06-15 DIAGNOSIS — M20.42 HAMMER TOES OF BOTH FEET: ICD-10-CM

## 2023-06-15 DIAGNOSIS — E11.9 TYPE II DIABETES MELLITUS, WELL CONTROLLED: Primary | ICD-10-CM

## 2023-06-15 DIAGNOSIS — M20.41 HAMMER TOES OF BOTH FEET: ICD-10-CM

## 2023-06-15 DIAGNOSIS — M20.11 HALLUX ABDUCTO VALGUS, RIGHT: ICD-10-CM

## 2023-06-15 PROCEDURE — 99999 PR PBB SHADOW E&M-EST. PATIENT-LVL V: CPT | Mod: PBBFAC,,, | Performed by: PODIATRIST

## 2023-06-15 PROCEDURE — 99215 OFFICE O/P EST HI 40 MIN: CPT | Mod: PBBFAC | Performed by: PODIATRIST

## 2023-06-15 PROCEDURE — 99999 PR PBB SHADOW E&M-EST. PATIENT-LVL V: ICD-10-PCS | Mod: PBBFAC,,, | Performed by: PODIATRIST

## 2023-06-15 PROCEDURE — 99213 OFFICE O/P EST LOW 20 MIN: CPT | Mod: S$PBB,,, | Performed by: PODIATRIST

## 2023-06-15 PROCEDURE — 99213 PR OFFICE/OUTPT VISIT, EST, LEVL III, 20-29 MIN: ICD-10-PCS | Mod: S$PBB,,, | Performed by: PODIATRIST

## 2023-06-17 NOTE — PROGRESS NOTES
Subjective:       Patient ID: Gardenia Dent is a 83 y.o. female.    Chief Complaint: Follow-up and Diabetes Mellitus  Patient presents for follow up diabetes, arthritis, bunion right, hammertoes, neuropathy, nail problem.  Relates problems with dry skin on her feet but can not reach them to apply lotion.  Cervical radiculopathy, chronic neck and back pain, seeing Dr. Raines.  Relates she has been doing well, no significant changes in health since last visit. She relates glucose daily has been in the 200s, no burning or tingling in her feet.      Past Medical History:   Diagnosis Date    Anxiety     Asthma     Depression     Dyslipidemia     GERD (gastroesophageal reflux disease)     Hypothyroidism     Lumbar disc disease with radiculopathy     Type 2 diabetes mellitus 03/23/2021     Past Surgical History:   Procedure Laterality Date    HAND SURGERY      HIP SURGERY      HYSTERECTOMY      SINUS SURGERY      TOTAL KNEE ARTHROPLASTY      WRIST SURGERY       Family History   Problem Relation Age of Onset    Cancer Mother     Diabetes type II Mother     Heart disease Father     Heart disease Brother     Diabetes Brother     Diabetes type II Brother      Social History     Socioeconomic History    Marital status:    Tobacco Use    Smoking status: Never    Smokeless tobacco: Never   Substance and Sexual Activity    Alcohol use: No    Drug use: No    Sexual activity: Never       Current Outpatient Medications   Medication Sig Dispense Refill    ALPRAZolam (XANAX) 0.25 MG tablet TAKE ONE TABLET BY MOUTH TWICE DAILY AS NEEDED FOR ANXIETY THANK YOU!      ascorbic Acid (VITAMIN C) 500 mg CpSR Vitamin C 500 mg capsule,extended release   Take 1 capsule every day by oral route.      blood glucose control, high Soln   one touch verio reflect test strips, See Instructions, use in glucose meter to check  bs daily, # 50 EA, 3 Refill(s), Pharmacy: [facility], 165.1, cm, 08/02/22 13:28:00 CDT,  Height/Length Measured, 77.8, kg, 08/02/22 13:28:00 CD...      blood sugar diagnostic Strp   one touch verio reflect test strips, See Instructions, use in glucose meter to check  bs daily, # 50 EA, 3 Refill(s), Pharmacy: [facility], 165, cm, 03/09/23 13:09:00 CST, Height/Length Measured, 74.1, kg, 03/09/23 13:09:00 CST, Weight Dosing      bromelains (BROMELAIN MISC) Bromelain      calcium-vitamin D3 (OS-ANITA 500 + D3) 500 mg-5 mcg (200 unit) per tablet Calcium 500 + D      ginger, Zingiber officinalis, 250 mg Cap Ginger      levothyroxine (SYNTHROID) 75 MCG tablet   = 1 tab, Oral, Daily, # 90 tab, 3 Refill(s), Pharmacy: [facility], 165.1, cm, 11/22/21 11:29:00 CST, Height/Length Measured, 73, kg, 11/22/21 11:29:00 CST, Weight Dosing      multivit with minerals/lutein (MULTIVITAMIN 50 PLUS ORAL) multivitamin      rosuvastatin (CRESTOR) 5 MG tablet   See Instructions, TAKE 1 TABLET AT BEDTIME, # 90 tab, 3 Refill(s), Pharmacy: [facility] #[id number], 165.1, cm, 06/05/20 11:18:00 CDT, Height/Length Measured, Weight Dosing      turmeric, bulk, 100 % Powd turmeric (bulk)      zinc acetate 50 mg (zinc) Cap 50 mg.      cephALEXin (KEFLEX) 500 MG capsule Take 500 mg by mouth 4 (four) times daily.      diphenoxylate-atropine 2.5-0.025 mg (LOMOTIL) 2.5-0.025 mg per tablet   1 tab, Oral, QID, # 50 tab, 1 Refill(s), Pharmacy: [facility]acy #[id number], Chronic diarrhea of unknown origin, 165.1, cm, 06/28/22 13:29:00 CDT, Height/Length Measured, 76.5, kg, 06/28/22 13:29:00 CDT, Weight Dosing      DULoxetine (CYMBALTA) 20 MG capsule Take 1 capsule (20 mg total) by mouth 2 (two) times daily. 180 capsule 0    EScitalopram oxalate (LEXAPRO) 10 MG tablet 10 mg.      EScitalopram oxalate (LEXAPRO) 5 MG Tab 5 mg.      famotidine (PEPCID) 40 MG tablet Take 40 mg by mouth once daily.      loratadine-pseudoephedrine  mg (CLARITIN-D 24 HOUR)  mg per 24 hr tablet   1 tab, Oral, Daily, PRN as needed for  "congestion, # 30 tab, 1 Refill(s), Pharmacy: [facility] #[id number], 165.1, cm, 09/10/20 9:52:00 CDT, Height/Length Measured, 76.8, kg, 09/10/20 9:52:00 CDT, Weight Dosing      magnesium oxide 500 mg Tab 500 mg.      medical supply, miscellaneous (WALKER TIPS MISC)   walker with wheels, See Instructions, gait insstability, # 1 EA, 0 Refill(s)      omeprazole (PRILOSEC) 40 MG capsule 40 mg.      ondansetron (ZOFRAN-ODT) 4 MG TbDL Take 4 mg by mouth every 8 (eight) hours as needed.      oxyCODONE (ROXICODONE) 5 MG immediate release tablet Take 5 mg by mouth every 8 (eight) hours as needed.      pantoprazole (PROTONIX) 40 MG tablet       traMADoL (ULTRAM) 50 mg tablet   See Instructions, prn pain1 po tid-- discontinue alprazolam, # 30 tab, 3 Refill(s), Maintenance, Pharmacy: [facility], 165.1, cm, 08/02/22 13:28:00 CDT, Height/Length Measured, 77.8, kg, 08/02/22 13:28:00 CDT, Weight Dosing      triamcinolone acetonide 0.1% (KENALOG) 0.1 % cream Apply topically 2 (two) times daily.       No current facility-administered medications for this visit.     Review of patient's allergies indicates:   Allergen Reactions    Bacitracin Itching    Gentamicin     Solifenacin     Sulfa (sulfonamide antibiotics)        Review of Systems   Cardiovascular:  Negative for leg swelling.   Musculoskeletal:  Negative for gait problem.   All other systems reviewed and are negative.    Objective:      Vitals:    06/15/23 1036   BP: 106/69   Pulse: 96   Resp: 16   Weight: 75.4 kg (166 lb 4.8 oz)   Height: 5' 5" (1.651 m)     Physical Exam  Vitals and nursing note reviewed.   Constitutional:       General: She is not in acute distress.     Appearance: Normal appearance.   Cardiovascular:      Pulses:           Dorsalis pedis pulses are 2+ on the right side and 2+ on the left side.        Posterior tibial pulses are 1+ on the right side and 1+ on the left side.   Pulmonary:      Effort: Pulmonary effort is normal.   Musculoskeletal:    "      General: Normal range of motion.      Right foot: Deformity present.      Left foot: Deformity (Hammertoes bilateral, 2nd digit right most severe) and bunion present.   Feet:      Right foot:      Skin integrity: Callus (minimal callus medial 1st MPJ right) present. No skin breakdown.      Toenail Condition: Right toenails are long.      Left foot:      Skin integrity: No skin breakdown.      Toenail Condition: Left toenails are long.   Skin:     Capillary Refill: Capillary refill takes 2 to 3 seconds.   Neurological:      General: No focal deficit present.      Mental Status: She is alert.   Psychiatric:         Mood and Affect: Mood normal.         Behavior: Behavior normal.         Thought Content: Thought content normal.         Judgment: Judgment normal.                      Assessment:       1. Type II diabetes mellitus, well controlled    2. Hallux abducto valgus, right    3. Hammer toes of both feet - Right Foot          Plan:       Reviewed diabetic education  Reviewed benefit of controled glucose/diabetes  Reviewed hammertoes, arthritis, bunions, potential complications regarding areas due to rubbing and pointed out some discoloration over the bunion joint.  Reviewed wide, light appropriate tennis shoes,  especially indoors to protect feet, no flat shoes, slippers or walking in sock or bare feet.    Discussed care maintenance of dry skin and nails and potential complications. Nails debrided  Reviewed need for daily foot checks and instructed patient to contact the office with any area of redness or swelling which has not improved within 3 days.  Patient was in understanding and agreement with treatment plan.  I counseled the patient on their conditions, implications and medical management.  Instructed patient to contact the office with any changes, questions, concerns, worsening of symptoms.   Total face to face time 20 minutes, exam, assessment, treatment, discussion, additional time for review of  chart prior to and following appointment and visit documentation, consultation and coordination of care.   Follow up prn 3 months    This note was created using [employer] voice recognition software that occasionally misinterpreted phrases or words.

## 2023-09-15 ENCOUNTER — OFFICE VISIT (OUTPATIENT)
Dept: PODIATRY | Facility: CLINIC | Age: 83
End: 2023-09-15
Payer: MEDICARE

## 2023-09-15 VITALS
WEIGHT: 165.88 LBS | DIASTOLIC BLOOD PRESSURE: 76 MMHG | HEART RATE: 92 BPM | HEIGHT: 65 IN | RESPIRATION RATE: 16 BRPM | BODY MASS INDEX: 27.64 KG/M2 | SYSTOLIC BLOOD PRESSURE: 153 MMHG

## 2023-09-15 DIAGNOSIS — M20.11 HALLUX ABDUCTO VALGUS, RIGHT: Primary | ICD-10-CM

## 2023-09-15 DIAGNOSIS — M20.42 HAMMER TOES OF BOTH FEET: ICD-10-CM

## 2023-09-15 DIAGNOSIS — M20.41 HAMMER TOES OF BOTH FEET: ICD-10-CM

## 2023-09-15 DIAGNOSIS — E11.9 TYPE II DIABETES MELLITUS, WELL CONTROLLED: ICD-10-CM

## 2023-09-15 DIAGNOSIS — M79.675 TOE PAIN, LEFT: ICD-10-CM

## 2023-09-15 PROCEDURE — 99214 PR OFFICE/OUTPT VISIT, EST, LEVL IV, 30-39 MIN: ICD-10-PCS | Mod: S$PBB,,, | Performed by: PODIATRIST

## 2023-09-15 PROCEDURE — 99214 OFFICE O/P EST MOD 30 MIN: CPT | Mod: S$PBB,,, | Performed by: PODIATRIST

## 2023-09-15 PROCEDURE — 99999 PR PBB SHADOW E&M-EST. PATIENT-LVL V: CPT | Mod: PBBFAC,,, | Performed by: PODIATRIST

## 2023-09-15 PROCEDURE — 99999 PR PBB SHADOW E&M-EST. PATIENT-LVL V: ICD-10-PCS | Mod: PBBFAC,,, | Performed by: PODIATRIST

## 2023-09-15 PROCEDURE — 99215 OFFICE O/P EST HI 40 MIN: CPT | Mod: PBBFAC | Performed by: PODIATRIST

## 2023-09-15 RX ORDER — ESCITALOPRAM OXALATE 10 MG/1
10 TABLET ORAL
COMMUNITY
Start: 2023-03-09

## 2023-09-15 RX ORDER — ALPRAZOLAM 0.25 MG/1
0.25 TABLET ORAL
COMMUNITY
Start: 2023-03-09

## 2023-09-15 RX ORDER — LEVOTHYROXINE SODIUM 75 UG/1
TABLET ORAL
COMMUNITY
Start: 2022-11-13

## 2023-09-17 NOTE — PROGRESS NOTES
Subjective:       Patient ID: Gardenia Dent is a 83 y.o. female.    Chief Complaint: Follow-up, Hammer Toe, Toe Pain, Diabetes Mellitus, and Nail Problem  Patient presents for follow up diabetes, arthritis, bunion right, hammertoes.  Complaining of pain 1st and 2nd digits left foot, red, rubbing. Pain level 7/10  Relates no change in diabetes, glucose has been in the 200s, 210-220      Past Medical History:   Diagnosis Date    Anxiety     Asthma     Depression     Dyslipidemia     GERD (gastroesophageal reflux disease)     Hypothyroidism     Lumbar disc disease with radiculopathy     Type 2 diabetes mellitus 03/23/2021     Past Surgical History:   Procedure Laterality Date    HAND SURGERY      HIP SURGERY      HYSTERECTOMY      SINUS SURGERY      TOTAL KNEE ARTHROPLASTY      WRIST SURGERY       Family History   Problem Relation Age of Onset    Cancer Mother     Diabetes type II Mother     Heart disease Father     Heart disease Brother     Diabetes Brother     Diabetes type II Brother      Social History     Socioeconomic History    Marital status:    Tobacco Use    Smoking status: Never    Smokeless tobacco: Never   Substance and Sexual Activity    Alcohol use: No    Drug use: No    Sexual activity: Never       Current Outpatient Medications   Medication Sig Dispense Refill    ALPRAZolam (XANAX) 0.25 MG tablet 0.25 mg.      ascorbic Acid (VITAMIN C) 500 mg CpSR Vitamin C 500 mg capsule,extended release   Take 1 capsule every day by oral route.      blood glucose control, high Soln   one touch verio reflect test strips, See Instructions, use in glucose meter to check  bs daily, # 50 EA, 3 Refill(s), Pharmacy: [facility], 165.1, cm, 08/02/22 13:28:00 CDT, Height/Length Measured, 77.8, kg, 08/02/22 13:28:00 CD...      blood sugar diagnostic Strp   one touch verio reflect test strips, See Instructions, use in glucose meter to check  bs daily, # 50 EA, 3 Refill(s), Pharmacy: [facility], 165, cm, 03/09/23 13:09:00 CST, Height/Length Measured, 74.1, kg, 03/09/23 13:09:00 CST, Weight Dosing      bromelains (BROMELAIN MISC) Bromelain      calcium-vitamin D3 (OS-ANITA 500 + D3) 500 mg-5 mcg (200 unit) per tablet Calcium 500 + D      EScitalopram oxalate (LEXAPRO) 10 MG tablet 10 mg.      famotidine (PEPCID) 40 MG tablet Take 40 mg by mouth once daily.      ginger, Zingiber officinalis, 250 mg Cap Ginger      levothyroxine (SYNTHROID) 75 MCG tablet   = 1 tab, Oral, Daily, # 90 tab, 3 Refill(s), Maintenance, Pharmacy: [facility], 165, cm, 11/03/22 13:34:00 CDT, Height/Length Measured, 77.8, kg, 11/03/22 13:34:00 CDT, Weight Dosing      loratadine-pseudoephedrine  mg (CLARITIN-D 24 HOUR)  mg per 24 hr tablet   1 tab, Oral, Daily, PRN as needed for congestion, # 30 tab, 1 Refill(s), Pharmacy: [id number], 165.1, cm, 09/10/20 9:52:00 CDT, Height/Length Measured, 76.8, kg, 09/10/20 9:52:00 CDT, Weight Dosing      magnesium oxide 500 mg Tab 500 mg.      medical supply, miscellaneous (WALKER TIPS MISC)   walker with wheels, See Instructions, gait insstability, # 1 EA, 0 Refill(s)      multivit with minerals/lutein (MULTIVITAMIN 50 PLUS ORAL) multivitamin      omeprazole (PRILOSEC) 40 MG capsule 40 mg.      pantoprazole (PROTONIX) 40 MG tablet       rosuvastatin (CRESTOR) 5 MG tablet   See Instructions, TAKE 1 TABLET AT BEDTIME, # 90 tab, 3 Refill(s), Pharmacy: [facility] #[id number], 165.1, cm, 06/05/20 11:18:00 CDT, Height/Length Measured, Weight Dosing      triamcinolone acetonide 0.1% (KENALOG) 0.1 % cream Apply topically 2 (two) times daily.      turmeric, bulk, 100 % Powd turmeric (bulk)      zinc acetate 50 mg (zinc) Cap 50 mg.      diphenoxylate-atropine 2.5-0.025 mg (LOMOTIL) 2.5-0.025 mg per tablet   1 tab, Oral, QID, # 50 tab, 1 Refill(s), Pharmacy: [facility] #[id number], Chronic diarrhea of unknown origin, 165.1, cm, 06/28/22 13:29:00 CDT,  "Height/Length Measured, 76.5, kg, 06/28/22 13:29:00 CDT, Weight Dosing      DULoxetine (CYMBALTA) 20 MG capsule Take 1 capsule (20 mg total) by mouth 2 (two) times daily. 180 capsule 0     No current facility-administered medications for this visit.     Review of patient's allergies indicates:   Allergen Reactions    Bacitracin Itching    Gentamicin     Solifenacin     Sulfa (sulfonamide antibiotics)        Review of Systems   Musculoskeletal:  Negative for gait problem.   All other systems reviewed and are negative.      Objective:      Vitals:    09/15/23 1056   BP: (!) 153/76   Pulse: 92   Resp: 16   Weight: 75.3 kg (165 lb 14.4 oz)   Height: 5' 5" (1.651 m)     Physical Exam  Vitals and nursing note reviewed.   Constitutional:       General: She is not in acute distress.     Appearance: Normal appearance.   Cardiovascular:      Pulses:           Dorsalis pedis pulses are 2+ on the right side and 2+ on the left side.        Posterior tibial pulses are 1+ on the right side and 1+ on the left side.   Pulmonary:      Effort: Pulmonary effort is normal.   Musculoskeletal:         General: Normal range of motion.      Right foot: Deformity and bunion present.      Left foot: Deformity (Hammertoes bilateral, 2nd digit right most severe) and bunion present.   Feet:      Right foot:      Skin integrity: No erythema.      Toenail Condition: Right toenails are long.      Left foot:      Skin integrity: Erythema (Tender erythematous area medial PIPJ 2nd digit left without callus or skin break at this time) present.      Toenail Condition: Left toenails are long.   Skin:     Capillary Refill: Capillary refill takes 2 to 3 seconds.   Neurological:      General: No focal deficit present.   Psychiatric:         Behavior: Behavior normal.         Thought Content: Thought content normal.                        Assessment:       1. Hallux abducto valgus, right    2. Hammer toes of both feet - Right Foot    3. Type II diabetes " mellitus, well controlled    4. Toe pain, left            Plan:         Reviewed bunions hammertoes with patient friction 2nd digit left foot.  Advised this is due to shoes and pressure bunion pushing on the 2nd digit.  Pain level is high due to inflammation along the side of the 2nd toe involving the nerve  We would a lengthy discussion regarding appropriate shoes, tennis shoes must be wide, light weight, soft material  We discussed ice/cool therapy frequency this should be as long as well tolerated and beneficial to reduce pain, swelling and redness  Reviewed over-the-counter Voltaren gel as directed applied to this area for pain and swelling   We discussed light padding between the 1st and 2nd digit, use daily when in tennis shoes as long as well tolerated.  Once pain-free continue to use padding to prevent recurrence can also use baby powder to reduce friction and absorb moisture  Contact the office immediately with any changes  Reviewed diabetic education  Reviewed benefit of controled glucose/diabetes   Discussed care maintenance of dry skin and nails and potential complications. Nails debrided  Reviewed need for daily foot checks and instructed patient to contact the office with any area of redness or swelling which has not improved within 3 days.  Patient was in understanding and agreement with treatment plan.  I counseled the patient on their conditions, implications and medical management.  Instructed patient to contact the office with any changes, questions, concerns, worsening of symptoms.   Total face to face time 30 minutes, exam, assessment, treatment, discussion, additional time for review of chart prior to and following appointment and visit documentation, consultation and coordination of care.   Follow up prn 3 months    This note was created using M*Modal voice recognition software that occasionally misinterpreted phrases or words.

## 2023-10-27 DIAGNOSIS — M54.50 CHRONIC LUMBAR PAIN: Primary | ICD-10-CM

## 2023-10-27 DIAGNOSIS — G89.29 CHRONIC LUMBAR PAIN: Primary | ICD-10-CM

## 2023-11-17 ENCOUNTER — CLINICAL SUPPORT (OUTPATIENT)
Dept: REHABILITATION | Facility: HOSPITAL | Age: 83
End: 2023-11-17
Payer: MEDICARE

## 2023-11-17 DIAGNOSIS — G89.29 CHRONIC LUMBAR PAIN: ICD-10-CM

## 2023-11-17 DIAGNOSIS — Z74.09 IMPAIRED FUNCTIONAL MOBILITY, BALANCE, GAIT, AND ENDURANCE: Primary | ICD-10-CM

## 2023-11-17 DIAGNOSIS — M54.50 CHRONIC LUMBAR PAIN: ICD-10-CM

## 2023-11-17 PROCEDURE — 97161 PT EVAL LOW COMPLEX 20 MIN: CPT | Mod: PN

## 2023-11-17 NOTE — PLAN OF CARE
[facility]   Physical Therapy Initial Evaluation      Name: Gardenia Dent  Clinic Number: [id number]    Therapy Diagnosis:   Encounter Diagnoses   Name Primary?    Chronic lumbar pain     Impaired functional mobility, balance, gait, and endurance Yes        Physician: Oxana Greene MD    Physician Orders:  PT eval and treat   Medical Diagnosis from Referral:   Diagnosis   M54.50,G89.29 (ICD-10-CM) - Chronic lumbar pain     Evaluation Date: 11/17/2023  Authorization Period Expiration: 11/17/2024  Plan of Care Expiration: 02/17/2024  Progress Note Due: 12/31/2023  Date of Surgery: n/a  Visit # / Visits authorized: 1/ 1   FOTO: 1/ 3    Precautions: Standard, Diabetes, and Fall     Time In: 2:00 pm   Time Out: 2:45 pm   Total Billable Time: 45 minutes    Subjective     Date of onset: Chronic  history of lower back pain; decreased functional mobility with fall risk     History of current condition - Gardenia reports: She feels like her balance is not as good as it used to be; She reports catching herself from falling several times in the course of a few weeks - stated that she doesn't always pick-up her feet - left more than right like she should and then she looses her balance.  Her  passed away about 6 months ago - she stated that she tries to stay busy, but finds she just doesn't do as much anymore. Her lower back has always been a source of pain for her - she has been to therapy several times as a result.  She used to help her  stand from the chair, lift him when he fell - putting a great deal of strain on her back.  Gardenia reported that her blood sugar levels have increased and she does not want to start on insulin - would like to start exercising to help with this.     Falls: Reports 2 falls over past 6 months.     Imaging: : nothing on file     Prior Therapy: Yes - lower back pain   Social History: lives alone, single story home   Occupation: retired   Prior Level of  Function: Independent , use to walk up/down her street, about 1/2 mile   Current Level of Function: Independent, has someone to help clean her house; someone to do the yard work.     Pain:  Current 4/10, worst 8/10, best 2/10   Location: left  lower back pain with Left sciatica   Description: Aching, Grabbing, and Tight  Aggravating Factors: Standing, Bending, and Lifting  Easing Factors: sitting, lying down,     Patients goals: To get myself in better shape;      Medical History:   Past Medical History:   Diagnosis Date    Anxiety     Asthma     Depression     Dyslipidemia     GERD (gastroesophageal reflux disease)     Hypothyroidism     Lumbar disc disease with radiculopathy     Type 2 diabetes mellitus 03/23/2021       Surgical History:   Gardenia Dent  has a past surgical history that includes Hysterectomy; Total knee arthroplasty; Wrist surgery; Hand surgery; Hip surgery; and Sinus surgery.    Medications:   Gardenia has a current medication list which includes the following prescription(s): alprazolam, ascorbic acid, blood glucose control, high, blood sugar diagnostic, bromelains, calcium-vitamin d3, diphenoxylate-atropine 2.5-0.025 mg, duloxetine, escitalopram oxalate, famotidine, murtaza (zingiber officinalis), levothyroxine, claritin-d 24 hour, magnesium oxide, medical supply, miscellaneous, multivit with minerals/lutein, omeprazole, pantoprazole, rosuvastatin, triamcinolone acetonide 0.1%, turmeric (bulk), and zinc acetate.    Allergies:   Review of patient's allergies indicates:   Allergen Reactions    Bacitracin Itching    Gentamicin     Solifenacin     Sulfa (sulfonamide antibiotics)         Objective      Observation: Gardenia is alert/oriented x 4; She is walking with use of a single point cane for safety/reassurance; Today, she is in no acute distress.     Posture:    -       Rounded shoulders  -       Forward head  -       Anterior pelvic tilt    Gait: using single point cane, but not really applying  "any pressure to it; gait is slow, cautious     Lumbar Range of Motion:    Active Range Pain   Flexion Hands to mid shins     Extension To neutral     Left Side Bending Hands to mid thigh     Right Side Bending Hands to mid thigh     Left rotation 40%    Right Rotation 40%        Lower Extremity Strength   Left Right   Knee extension: 4-/5 4-/5   Knee flexion: 3+/5 3+/5   Hip flexion: 4/5 4/5   Hip extension:  3+/5 3+/5   Hip abduction: 3+/5 3+/5   Hip adduction: 3/5 3/5   Ankle dorsiflexion: 3+/5  4-/5   Ankle plantarflexion: 4-/5 4-/5   Upper abdominals 3/5    Lower abdominals 3-/5    Back extensors 3/5      Special Tests:    Repeated Flexion Negative   Repeated Extension Negative   Prone Instability Negative   Straight Leg Raise Negative   Slump Negative   Quadrant Negative   Femoral nerve test Negative       DTR:   Left Right Comment   Patellar (L3-4) 2+ 2+    Achilles (S1) 1+ 1+        Joint Mobility:   Lumbar: general restriction in lumbar segmental flexion, myofascial tightness noted as well     Functional Mobility:     30 sec sit <> stand 7 reps   SLS - able to attempt, but cannot maintain   Tandem Stance: cannot attain, but can bring foot past other one and maintain balance   Toe-Taps; 6" step - able to perform with ok weight shift as long as she uses her Ue's for support;  Without Ue's - pace is slow; trouble clearing left foot, but no LOB.     Palpation: minimal tenderness to palpation at lumbar paraspinals, piriformis on left     Sensation: intact to light touch BLE's     Flexibility:     90/90 SLR = R moderate restriction, L moderate restriction   Joe test: R moderate restriction, L moderate restriction    PT Evaluation Completed: Yes  Discussed Plan of Care with patient: Yes    Intake Outcome Measure for FOTO Lumbar  Survey    Therapist reviewed FOTO scores for Gardenia Dent on 11/17/2023.   FOTO report - see Media section or FOTO account episode details.    Intake Score: 32   Higher score = " greater disability          Treatment       Patient Education and Home Exercises     Education provided:   - Role of PT; work on strength, balance, lumbar pain     Written Home Exercises Provided: No exercises given today, will develop program over next few sessions.   Gardenia demonstrated good  understanding of the education provided. See EMR under Patient Instructions for exercises provided during therapy sessions.    Assessment     Gardenia is a 83 y.o. female referred to outpatient Physical Therapy with a medical diagnosis of Chronic lumbar pain. Patient presents with deconditioning, impaired gait, balance and general functional mobility with chronic lower back pain, increased risk for falls.     Patient prognosis is Good.   Patient will benefit from skilled outpatient Physical Therapy to address the deficits stated above and in the chart below, provide patient /family education, and to maximize patientt's level of independence.     Plan of care discussed with patient: Yes  Patient's spiritual, cultural and educational needs considered and patient is agreeable to the plan of care and goals as stated below:     Anticipated Barriers for therapy: none     Medical Necessity is demonstrated by the following  History  Co-morbidities and personal factors that may impact the plan of care [x] LOW: no personal factors / co-morbidities  [] MODERATE: 1-2 personal factors / co-morbidities  [] HIGH: 3+ personal factors / co-morbidities    Moderate / High Support Documentation:   Co-morbidities affecting plan of care: n/a     Personal Factors:   lifestyle     Examination  Body Structures and Functions, activity limitations and participation restrictions that may impact the plan of care [x] LOW: addressing 1-2 elements  [] MODERATE: 3+ elements  [] HIGH: 4+ elements (please support below)    Moderate / High Support Documentation: n/a     Clinical Presentation [x] LOW: stable  [] MODERATE: Evolving  [] HIGH: Unstable     Decision  Making/ Complexity Score: low       Goals:  Short Term Goals: 3 weeks   Reduction in pain to no more than 5/10 for improved ability to perform daily activities.   Able to walk 2 blocks without increase in symptoms   Compliant with HEP     Long Term Goals: 6 weeks   Reduction in pain to 2-3/10 at most with increase in ability to perform more daily activities.   Lumbar AROM WFL without increased symptoms   Able to walk 1/4 mile without increased symptoms - use of AD if needed   Able to perform SLS, Tandem Stance on firm surface x 15 sec.   FOTO score improved to < 30 .  Independent with HEP for continued improvement in function.   Plan     Plan of care Certification: 11/17/2023 to 02/17/2024.    Outpatient Physical Therapy 2 times weekly for 6 weeks to include the following interventions: Manual Therapy, Moist Heat/ Ice, Neuromuscular Re-ed, Patient Education, Therapeutic Activities, and Therapeutic Exercise.     Ramona Ron, PT        Physician's Signature: _________________________________________ Date: ________________

## 2023-11-19 NOTE — PLAN OF CARE
PT met face to face with Luis Omer PTA to discuss patient's treatment plan and progress towards established goals.  Treatment will be continued as described in initial report/eval and progress notes.  Patient will be seen by physical therapist every sixth visit and minimally once per month.    Additional information: general deconditioning, balance, fall risk; lumbar pain, weakness.

## 2023-11-30 ENCOUNTER — OFFICE VISIT (OUTPATIENT)
Dept: PODIATRY | Facility: CLINIC | Age: 83
End: 2023-11-30
Payer: MEDICARE

## 2023-11-30 VITALS
BODY MASS INDEX: 27.66 KG/M2 | WEIGHT: 166 LBS | HEIGHT: 65 IN | RESPIRATION RATE: 18 BRPM | HEART RATE: 77 BPM | SYSTOLIC BLOOD PRESSURE: 132 MMHG | DIASTOLIC BLOOD PRESSURE: 73 MMHG

## 2023-11-30 DIAGNOSIS — G57.93 NEUROPATHY OF BOTH FEET: ICD-10-CM

## 2023-11-30 DIAGNOSIS — E11.9 TYPE II DIABETES MELLITUS, WELL CONTROLLED: ICD-10-CM

## 2023-11-30 DIAGNOSIS — M20.11 HALLUX ABDUCTO VALGUS, RIGHT: ICD-10-CM

## 2023-11-30 DIAGNOSIS — M20.41 HAMMER TOES OF BOTH FEET: Primary | ICD-10-CM

## 2023-11-30 DIAGNOSIS — M20.42 HAMMER TOES OF BOTH FEET: Primary | ICD-10-CM

## 2023-11-30 PROCEDURE — 99213 PR OFFICE/OUTPT VISIT, EST, LEVL III, 20-29 MIN: ICD-10-PCS | Mod: S$PBB,,, | Performed by: PODIATRIST

## 2023-11-30 PROCEDURE — 99213 OFFICE O/P EST LOW 20 MIN: CPT | Mod: S$PBB,,, | Performed by: PODIATRIST

## 2023-11-30 PROCEDURE — 99215 OFFICE O/P EST HI 40 MIN: CPT | Mod: PBBFAC | Performed by: PODIATRIST

## 2023-11-30 PROCEDURE — 99999 PR PBB SHADOW E&M-EST. PATIENT-LVL V: ICD-10-PCS | Mod: PBBFAC,,, | Performed by: PODIATRIST

## 2023-11-30 PROCEDURE — 99999 PR PBB SHADOW E&M-EST. PATIENT-LVL V: CPT | Mod: PBBFAC,,, | Performed by: PODIATRIST

## 2023-11-30 RX ORDER — MONTELUKAST SODIUM 4 MG/1
1 TABLET, CHEWABLE ORAL
COMMUNITY
Start: 2023-10-24 | End: 2024-02-21

## 2023-12-03 NOTE — PROGRESS NOTES
Subjective:       Patient ID: Gardenia Dent is a 83 y.o. female.    Chief Complaint: Toe Pain, Diabetes Mellitus, and Nail Problem  Patient presents for follow up diabetes, arthritis, bunion and hammertoe right foot.  Relates toes have been doing well, wearing shoes to accommodate for bunion and hammertoe on the right foot.  Has not had any rubbing or pain.  Is wearing shoes indoors to protect her feet  Relates no change in diabetes, taking duloxetine for neuropathy, does not know her A1c      Past Medical History:   Diagnosis Date    Anxiety     Asthma     Depression     Dyslipidemia     GERD (gastroesophageal reflux disease)     Hypothyroidism     Lumbar disc disease with radiculopathy     Type 2 diabetes mellitus 03/23/2021     Past Surgical History:   Procedure Laterality Date    HAND SURGERY      HIP SURGERY      HYSTERECTOMY      SINUS SURGERY      TOTAL KNEE ARTHROPLASTY      WRIST SURGERY       Family History   Problem Relation Age of Onset    Cancer Mother     Diabetes type II Mother     Heart disease Father     Heart disease Brother     Diabetes Brother     Diabetes type II Brother      Social History     Socioeconomic History    Marital status:    Tobacco Use    Smoking status: Never    Smokeless tobacco: Never   Substance and Sexual Activity    Alcohol use: No    Drug use: No    Sexual activity: Never       Current Outpatient Medications   Medication Sig Dispense Refill    ALPRAZolam (XANAX) 0.25 MG tablet 0.25 mg.      ascorbic Acid (VITAMIN C) 500 mg CpSR Vitamin C 500 mg capsule,extended release   Take 1 capsule every day by oral route.      blood glucose control, high Soln   one touch verio reflect test strips, See Instructions, use in glucose meter to check  bs daily, # 50 EA, 3 Refill(s), Pharmacy: [facility], 165.1, cm, 08/02/22 13:28:00 CDT, Height/Length Measured, 77.8, kg, 08/02/22 13:28:00 CD...      blood sugar diagnostic Strp   one touch verio reflect test  strips, See Instructions, use in glucose meter to check  bs daily, # 50 EA, 3 Refill(s), Pharmacy: [facility], 165, cm, 03/09/23 13:09:00 CST, Height/Length Measured, 74.1, kg, 03/09/23 13:09:00 CST, Weight Dosing      bromelains (BROMELAIN MISC) Bromelain      calcium-vitamin D3 (OS-ANITA 500 + D3) 500 mg-5 mcg (200 unit) per tablet Calcium 500 + D      colestipoL (COLESTID) 1 gram Tab 1 g.      diphenoxylate-atropine 2.5-0.025 mg (LOMOTIL) 2.5-0.025 mg per tablet   1 tab, Oral, QID, # 50 tab, 1 Refill(s), Pharmacy: [facility]/pharmacy #[id number], Chronic diarrhea of unknown origin, 165.1, cm, 06/28/22 13:29:00 CDT, Height/Length Measured, 76.5, kg, 06/28/22 13:29:00 CDT, Weight Dosing      EScitalopram oxalate (LEXAPRO) 10 MG tablet 10 mg.      famotidine (PEPCID) 40 MG tablet Take 40 mg by mouth once daily.      ginger, Zingiber officinalis, 250 mg Cap Ginger      levothyroxine (SYNTHROID) 75 MCG tablet   = 1 tab, Oral, Daily, # 90 tab, 3 Refill(s), Maintenance, Pharmacy: [facility], 165, cm, 11/03/22 13:34:00 CDT, Height/Length Measured, 77.8, kg, 11/03/22 13:34:00 CDT, Weight Dosing      loratadine-pseudoephedrine  mg (CLARITIN-D 24 HOUR)  mg per 24 hr tablet   1 tab, Oral, Daily, PRN as needed for congestion, # 30 tab, 1 Refill(s), Pharmacy: [facility] #[id number], 165.1, cm, 09/10/20 9:52:00 CDT, Height/Length Measured, 76.8, kg, 09/10/20 9:52:00 CDT, Weight Dosing      magnesium oxide 500 mg Tab 500 mg.      medical supply, miscellaneous (WALKER TIPS MISC)   walker with wheels, See Instructions, gait insstability, # 1 EA, 0 Refill(s)      multivit with minerals/lutein (MULTIVITAMIN 50 PLUS ORAL) multivitamin      omeprazole (PRILOSEC) 40 MG capsule 40 mg.      pantoprazole (PROTONIX) 40 MG tablet       rosuvastatin (CRESTOR) 5 MG tablet   See Instructions, TAKE 1 TABLET AT BEDTIME, # 90 tab, 3 Refill(s), Pharmacy: [facility] #[id number], 165.1, cm, 06/05/20 11:18:00 CDT,  "Height/Length Measured, Weight Dosing      triamcinolone acetonide 0.1% (KENALOG) 0.1 % cream Apply topically 2 (two) times daily.      turmeric, bulk, 100 % Powd turmeric (bulk)      zinc acetate 50 mg (zinc) Cap 50 mg.      DULoxetine (CYMBALTA) 20 MG capsule Take 1 capsule (20 mg total) by mouth 2 (two) times daily. 180 capsule 0     No current facility-administered medications for this visit.     Review of patient's allergies indicates:   Allergen Reactions    Bacitracin Itching    Gentamicin     Solifenacin     Sulfa (sulfonamide antibiotics)        Review of Systems   Musculoskeletal:  Negative for gait problem.   All other systems reviewed and are negative.      Objective:      Vitals:    11/30/23 1109   BP: 132/73   Pulse: 77   Resp: 18   Weight: 75.3 kg (166 lb)   Height: 5' 5" (1.651 m)     Physical Exam  Vitals and nursing note reviewed.   Constitutional:       General: She is not in acute distress.     Appearance: Normal appearance.   Cardiovascular:      Pulses:           Dorsalis pedis pulses are 2+ on the right side and 2+ on the left side.        Posterior tibial pulses are 1+ on the right side and 1+ on the left side.   Pulmonary:      Effort: Pulmonary effort is normal.   Musculoskeletal:         General: Normal range of motion.      Right foot: Deformity and bunion present.      Left foot: Deformity (Hammertoes bilateral, 2nd digit right most severe) and bunion present.   Feet:      Right foot:      Skin integrity: No skin breakdown.      Toenail Condition: Right toenails are long.      Left foot:      Skin integrity: No skin breakdown.      Toenail Condition: Left toenails are long.   Skin:     Capillary Refill: Capillary refill takes 2 to 3 seconds.   Neurological:      General: No focal deficit present.      Mental Status: She is alert.   Psychiatric:         Behavior: Behavior normal.         Thought Content: Thought content normal.                      Assessment:       No diagnosis " found.          Plan:         Reviewed bunion and hammertoe right foot and potential complications  Reviewed wide appropriate shoes, avoid any shoe which causes rubbing/redness over the 2nd digit.  Advised patient this is an area at high risk for developing a callus or blister which can progress to an open sore  We discussed use of Voltaren gel to these joints if they are painful or inflamed due to underlying arthritis  Reviewed soaking regimens especially to help with nerve pain if increased in the evening  Reviewed care and maintenance of skin and nails, nails debrided, thickness reduced, no complications at this time  We reviewed use of Vicks vapor rub daily on toenails  Reviewed diabetic education  Reviewed benefit of controled glucose/diabetes   Reviewed need for daily foot checks and instructed patient to contact the office with any area of redness or swelling which has not improved within 3 days.  Patient was in understanding and agreement with treatment plan.  I counseled the patient on their conditions, implications and medical management.  Instructed patient to contact the office with any changes, questions, concerns, worsening of symptoms.   Total face to face time 20 minutes, exam, assessment, treatment, discussion, additional time for review of chart prior to and following appointment and visit documentation, consultation and coordination of care.   Follow up prn 3 months    This note was created using M*Modal voice recognition software that occasionally misinterpreted phrases or words.

## 2024-02-22 ENCOUNTER — OFFICE VISIT (OUTPATIENT)
Dept: PODIATRY | Facility: CLINIC | Age: 84
End: 2024-02-22
Payer: MEDICARE

## 2024-02-22 VITALS
RESPIRATION RATE: 18 BRPM | WEIGHT: 167.13 LBS | BODY MASS INDEX: 27.85 KG/M2 | DIASTOLIC BLOOD PRESSURE: 77 MMHG | SYSTOLIC BLOOD PRESSURE: 136 MMHG | HEART RATE: 83 BPM | HEIGHT: 65 IN

## 2024-02-22 DIAGNOSIS — E11.9 TYPE II DIABETES MELLITUS, WELL CONTROLLED: ICD-10-CM

## 2024-02-22 DIAGNOSIS — M20.41 HAMMER TOES OF BOTH FEET: ICD-10-CM

## 2024-02-22 DIAGNOSIS — M20.11 HALLUX ABDUCTO VALGUS, RIGHT: ICD-10-CM

## 2024-02-22 DIAGNOSIS — M20.42 HAMMER TOES OF BOTH FEET: ICD-10-CM

## 2024-02-22 DIAGNOSIS — E11.9 COMPREHENSIVE DIABETIC FOOT EXAMINATION, TYPE 2 DM, ENCOUNTER FOR: Primary | ICD-10-CM

## 2024-02-22 DIAGNOSIS — G57.93 NEUROPATHY OF BOTH FEET: ICD-10-CM

## 2024-02-22 PROCEDURE — 99999 PR PBB SHADOW E&M-EST. PATIENT-LVL V: CPT | Mod: PBBFAC,,, | Performed by: PODIATRIST

## 2024-02-22 PROCEDURE — 99213 OFFICE O/P EST LOW 20 MIN: CPT | Mod: S$PBB,,, | Performed by: PODIATRIST

## 2024-02-22 PROCEDURE — 99215 OFFICE O/P EST HI 40 MIN: CPT | Mod: PBBFAC | Performed by: PODIATRIST

## 2024-02-22 RX ORDER — BUPROPION HYDROCHLORIDE 150 MG/1
150 TABLET ORAL EVERY MORNING
COMMUNITY
Start: 2024-02-20 | End: 2024-04-27

## 2024-02-24 NOTE — PROGRESS NOTES
Subjective:       Patient ID: Gardenia Dent is a 84 y.o. female.    Chief Complaint: Diabetic Foot Exam, Hammer Toe, and Nail Problem  Patient presents for annual diabetic foot exam, follow-up arthritis, bunion and hammertoe right foot.    Patient relates she has been much more conscious regarding appropriate shoes to accommodate bunions and hammertoes and this is helps pain she was having around the toenails  Relates no change in diabetes, taking duloxetine for neuropathy, does not know her A1c  No history of foot sores or infections.  Denies burning or tingling in feet.  States she has been going to physical therapy which is helpful, not sure it has helped with her balance which she is most concerned about  States she does not have any pain due to neuropathy but her feet feel numb in her balance is off    Past Medical History:   Diagnosis Date    Anxiety     Asthma     Depression     Dyslipidemia     GERD (gastroesophageal reflux disease)     Hypothyroidism     Lumbar disc disease with radiculopathy     Type 2 diabetes mellitus 03/23/2021     Past Surgical History:   Procedure Laterality Date    HAND SURGERY      HIP SURGERY      HYSTERECTOMY      SINUS SURGERY      TOTAL KNEE ARTHROPLASTY      WRIST SURGERY       Family History   Problem Relation Age of Onset    Cancer Mother     Diabetes type II Mother     Heart disease Father     Heart disease Brother     Diabetes Brother     Diabetes type II Brother      Social History     Socioeconomic History    Marital status:    Tobacco Use    Smoking status: Never    Smokeless tobacco: Never   Substance and Sexual Activity    Alcohol use: No    Drug use: No    Sexual activity: Never       Current Outpatient Medications   Medication Sig Dispense Refill    ALPRAZolam (XANAX) 0.25 MG tablet 0.25 mg.      ascorbic Acid (VITAMIN C) 500 mg CpSR Vitamin C 500 mg capsule,extended release   Take 1 capsule every day by oral route.      blood glucose control, high Soln    one touch verio reflect test strips, See Instructions, use in glucose meter to check  bs daily, # 50 EA, 3 Refill(s), [facility], 165.1, cm, 08/02/22 13:28:00 CDT, Height/Length Measured, 77.8, kg, 08/02/22 13:28:00 CD...      blood sugar diagnostic Strp   one touch verio reflect test strips, See Instructions, use in glucose meter to check  bs daily, # 50 EA, 3 Refill(s), Pharmacy: [facility], 165, cm, 03/09/23 13:09:00 CST, Height/Length Measured, 74.1, kg, 03/09/23 13:09:00 CST, Weight Dosing      bromelains (BROMELAIN MISC) Bromelain      calcium-vitamin D3 (OS-ANITA 500 + D3) 500 mg-5 mcg (200 unit) per tablet Calcium 500 + D      diphenoxylate-atropine 2.5-0.025 mg (LOMOTIL) 2.5-0.025 mg per tablet   1 tab, Oral, QID, # 50 tab, 1 Refill(s), Pharmacy: [facility]/pharmacy #[id number], Chronic diarrhea of unknown origin, 165.1, cm, 06/28/22 13:29:00 CDT, Height/Length Measured, 76.5, kg, 06/28/22 13:29:00 CDT, Weight Dosing      EScitalopram oxalate (LEXAPRO) 10 MG tablet 10 mg.      famotidine (PEPCID) 40 MG tablet Take 40 mg by mouth once daily.      ginger, Zingiber officinalis, 250 mg Cap Ginger      levothyroxine (SYNTHROID) 75 MCG tablet   = 1 tab, Oral, Daily, # 90 tab, 3 Refill(s), Maintenance, Pharmacy: [facility], 165, cm, 11/03/22 13:34:00 CDT, Height/Length Measured, 77.8, kg, 11/03/22 13:34:00 CDT, Weight Dosing      loratadine-pseudoephedrine  mg (CLARITIN-D 24 HOUR)  mg per 24 hr tablet   1 tab, Oral, Daily, PRN as needed for congestion, # 30 tab, 1 Refill(s), Pharmacy: [facility] #[id number], 165.1, cm, 09/10/20 9:52:00 CDT, Height/Length Measured, 76.8, kg, 09/10/20 9:52:00 CDT, Weight Dosing      magnesium oxide 500 mg Tab 500 mg.      medical supply, miscellaneous (WALKER TIPS MISC)   walker with wheels, See Instructions, gait insstability, # 1 EA, 0 Refill(s)      multivit with minerals/lutein (MULTIVITAMIN 50 PLUS ORAL) multivitamin  "     omeprazole (PRILOSEC) 40 MG capsule 40 mg.      pantoprazole (PROTONIX) 40 MG tablet       rosuvastatin (CRESTOR) 5 MG tablet   See Instructions, TAKE 1 TABLET AT BEDTIME, # 90 tab, 3 Refill(s), Pharmacy: [facility] #[id number], 165.1, cm, 06/05/20 11:18:00 CDT, Height/Length Measured, Weight Dosing      triamcinolone acetonide 0.1% (KENALOG) 0.1 % cream Apply topically 2 (two) times daily.      turmeric, bulk, 100 % Powd turmeric (bulk)      zinc acetate 50 mg (zinc) Cap 50 mg.      buPROPion (WELLBUTRIN XL) 150 MG TB24 tablet Take 150 mg by mouth every morning.      colestipoL (COLESTID) 1 gram Tab 1 g.      DULoxetine (CYMBALTA) 20 MG capsule Take 1 capsule (20 mg total) by mouth 2 (two) times daily. 180 capsule 0     No current facility-administered medications for this visit.     Review of patient's allergies indicates:   Allergen Reactions    Bacitracin Itching    Gentamicin     Solifenacin     Sulfa (sulfonamide antibiotics)        Review of Systems   Musculoskeletal:  Negative for gait problem.   All other systems reviewed and are negative.      Objective:      Vitals:    02/22/24 1354   BP: 136/77   Pulse: 83   Resp: 18   Weight: 75.8 kg (167 lb 1.6 oz)   Height: 5' 5" (1.651 m)     Physical Exam  Vitals and nursing note reviewed.   Constitutional:       General: She is not in acute distress.     Appearance: Normal appearance.   Cardiovascular:      Pulses:           Dorsalis pedis pulses are 2+ on the right side and 2+ on the left side.        Posterior tibial pulses are 1+ on the right side and 1+ on the left side.   Pulmonary:      Effort: Pulmonary effort is normal.   Musculoskeletal:         General: Normal range of motion.      Right foot: Deformity and bunion present.      Left foot: Deformity (Hammertoes bilateral, 2nd digit right most severe) and bunion present.   Feet:      Right foot:      Skin integrity: No skin breakdown.      Toenail Condition: Right toenails are long.      Left " foot:      Skin integrity: No skin breakdown.      Toenail Condition: Left toenails are long.   Skin:     Capillary Refill: Capillary refill takes 2 to 3 seconds.   Neurological:      General: No focal deficit present.      Mental Status: She is alert.   Psychiatric:         Behavior: Behavior normal.         Thought Content: Thought content normal.                          Assessment:       1. Comprehensive diabetic foot examination, type 2 DM, encounter for    2. Type II diabetes mellitus, well controlled    3. Neuropathy of both feet    4. Hallux abducto valgus, right    5. Hammer toes of both feet - Right Foot              Plan:           Comprehensive diabetic pedal exam performed.    Reviewed diabetic education  Reviewed neuropathy at length, advised patient not all neuropathy produces burning and tingling, numbness and balance problems are common   Strongly recommend she continue physical therapy long term  Reviewed benefit of controled glucose/diabetes regarding potential foot  complications  Reviewed bunion and hammertoe right foot and potential complications  Reviewed wide appropriate shoes, wear indoors to protect feet and for stability  Reviewed application of Voltaren gel as needed for joint pain in feet  Mild callus debrided 1st met head right foot without complication  Nails debrided.  Reviewed care and maintenance of skin and nails  Low risk for complications  Reviewed need for daily foot checks and instructed patient to contact the office with any area of redness or swelling which has not improved within 3 days.  Patient was in understanding and agreement with treatment plan.  I counseled the patient on their conditions, implications and medical management.  Instructed patient to contact the office with any changes, questions, concerns, worsening of symptoms.   Total face to face time 20 minutes, exam, assessment, treatment, discussion, additional time for review of chart prior to and following  appointment and visit documentation, consultation and coordination of care.   Follow up prn 3 months    This note was created using MSlinky voice recognition software that occasionally misinterpreted phrases or words.

## 2024-04-25 ENCOUNTER — OFFICE VISIT (OUTPATIENT)
Dept: PODIATRY | Facility: CLINIC | Age: 84
End: 2024-04-25
Payer: MEDICARE

## 2024-04-25 VITALS
BODY MASS INDEX: 28.77 KG/M2 | HEART RATE: 101 BPM | RESPIRATION RATE: 18 BRPM | HEIGHT: 65 IN | WEIGHT: 172.69 LBS | SYSTOLIC BLOOD PRESSURE: 128 MMHG | DIASTOLIC BLOOD PRESSURE: 87 MMHG

## 2024-04-25 DIAGNOSIS — M20.41 HAMMER TOE OF SECOND TOE OF RIGHT FOOT: Primary | ICD-10-CM

## 2024-04-25 DIAGNOSIS — E11.9 TYPE II DIABETES MELLITUS, WELL CONTROLLED: ICD-10-CM

## 2024-04-25 DIAGNOSIS — G57.93 NEUROPATHY OF BOTH FEET: ICD-10-CM

## 2024-04-25 PROCEDURE — 99213 OFFICE O/P EST LOW 20 MIN: CPT | Mod: S$PBB,,, | Performed by: PODIATRIST

## 2024-04-25 PROCEDURE — 99999 PR PBB SHADOW E&M-EST. PATIENT-LVL IV: CPT | Mod: PBBFAC,,, | Performed by: PODIATRIST

## 2024-04-25 PROCEDURE — 99214 OFFICE O/P EST MOD 30 MIN: CPT | Mod: PBBFAC | Performed by: PODIATRIST

## 2024-04-25 RX ORDER — ALPRAZOLAM 0.25 MG/1
0.25 TABLET ORAL
COMMUNITY
Start: 2024-02-20

## 2024-04-25 RX ORDER — ROSUVASTATIN CALCIUM 5 MG/1
TABLET, COATED ORAL
COMMUNITY
Start: 2024-01-21 | End: 2024-04-27 | Stop reason: SDUPTHER

## 2024-04-25 RX ORDER — PANTOPRAZOLE SODIUM 40 MG/1
TABLET, DELAYED RELEASE ORAL
COMMUNITY
Start: 2023-12-07

## 2024-04-25 RX ORDER — LEVOTHYROXINE SODIUM 75 UG/1
TABLET ORAL
COMMUNITY
Start: 2023-12-05

## 2024-04-26 DIAGNOSIS — M54.50 LOW BACK PAIN: Primary | ICD-10-CM

## 2024-04-26 DIAGNOSIS — M15.0 PRIMARY GENERALIZED (OSTEO)ARTHRITIS: ICD-10-CM

## 2024-04-27 NOTE — PROGRESS NOTES
Subjective:       Patient ID: Gardenia Dent is a 84 y.o. female.    Chief Complaint: Follow-up, Hammer Toe, Nail Problem, and Diabetes Mellitus  Patient presents for follow-up due to type 2 diabetes, having some problems with hammertoe 2nd digit right foot.  Relates wearing comfortable shoes to avoid pressure over this area, no change in shoes or activity and has had some rubbing.  Feels arthritis in the toe is getting worse.  She tries to stay active and do some walking every day.   Relates no change in diabetes, PCP has told her it is well-controlled  Denies burning and tingling in feet, but has been diagnosed with neuropathy which affects her balance      Past Medical History:   Diagnosis Date    Anxiety     Asthma     Depression     Dyslipidemia     GERD (gastroesophageal reflux disease)     Hypothyroidism     Lumbar disc disease with radiculopathy     Type 2 diabetes mellitus 03/23/2021     Past Surgical History:   Procedure Laterality Date    HAND SURGERY      HIP SURGERY      HYSTERECTOMY      SINUS SURGERY      TOTAL KNEE ARTHROPLASTY      WRIST SURGERY       Family History   Problem Relation Name Age of Onset    Cancer Mother      Diabetes type II Mother      Heart disease Father      Heart disease Brother      Diabetes Brother      Diabetes type II Brother       Social History     Socioeconomic History    Marital status:    Tobacco Use    Smoking status: Never    Smokeless tobacco: Never   Substance and Sexual Activity    Alcohol use: No    Drug use: No    Sexual activity: Never       Current Outpatient Medications   Medication Sig Dispense Refill    ALPRAZolam (XANAX) 0.25 MG tablet 0.25 mg.      blood glucose control, high Soln   one touch verio reflect test strips, See Instructions, use in glucose meter to check  bs daily, # 50 EA, 3 Refill(s), Pharmacy: [facility], 165.1, cm, 08/02/22 13:28:00 CDT, Height/Length Measured, 77.8, kg, 08/02/22 13:28:00 CD...      blood  sugar diagnostic Strp   one touch verio reflect test strips, See Instructions, use in glucose meter to check  bs daily, # 50 EA, 3 Refill(s), Pharmacy: [facility], 165, cm, 03/09/23 13:09:00 CST, Height/Length Measured, 74.1, kg, 03/09/23 13:09:00 CST, Weight Dosing      calcium-vitamin D3 (OS-ANITA 500 + D3) 500 mg-5 mcg (200 unit) per tablet Calcium 500 + D      EScitalopram oxalate (LEXAPRO) 10 MG tablet 10 mg.      levothyroxine (SYNTHROID) 75 MCG tablet = 1 tab, Oral, Daily, # 90 tab, 3 Refill(s), Maintenance, Pharmacy: [facility], 165, cm, 10/24/23 13:01:00 CDT, Height/Length Measured, 74.8, kg, 10/24/23 13:01:00 CDT, Weight Dosing      loratadine-pseudoephedrine  mg (CLARITIN-D 24 HOUR)  mg per 24 hr tablet   1 tab, Oral, Daily, PRN as needed for congestion, # 30 tab, 1 Refill(s), Pharmacy: [facility] #[id number], 165.1, cm, 09/10/20 9:52:00 CDT, Height/Length Measured, 76.8, kg, 09/10/20 9:52:00 CDT, Weight Dosing      magnesium oxide 500 mg Tab 500 mg.      multivit with minerals/lutein (MULTIVITAMIN 50 PLUS ORAL) multivitamin      omeprazole (PRILOSEC) 40 MG capsule 40 mg.      pantoprazole (PROTONIX) 40 MG tablet = 1 tab, Oral, Daily, # 90 tab, 3 Refill(s), Maintenance, Pharmacy: [id number], 165, cm, 10/24/23 13:01:00 CDT, Height/Length Measured, 74.8, kg, 10/24/23 13:01:00 CDT, Weight Dosing      rosuvastatin (CRESTOR) 5 MG tablet   See Instructions, TAKE 1 TABLET AT BEDTIME, # 90 tab, 3 Refill(s), Pharmacy: [facility] #[id number], 165.1, cm, 06/05/20 11:18:00 CDT, Height/Length Measured, Weight Dosing      turmeric, bulk, 100 % Powd turmeric (bulk)      zinc acetate 50 mg (zinc) Cap 50 mg.      ALPRAZolam (XANAX) 0.25 MG tablet 0.25 mg. (Patient not taking: Reported on 4/25/2024)      ascorbic Acid (VITAMIN C) 500 mg CpSR Vitamin C 500 mg capsule,extended release   Take 1 capsule every day by oral route. (Patient not taking: Reported on 4/25/2024)       bromelains (BROMELAIN MISC) Bromelain (Patient not taking: Reported on 4/25/2024)      buPROPion (WELLBUTRIN XL) 150 MG TB24 tablet Take 150 mg by mouth every morning. (Patient not taking: Reported on 4/25/2024)      colestipoL (COLESTID) 1 gram Tab 1 g.      diphenoxylate-atropine 2.5-0.025 mg (LOMOTIL) 2.5-0.025 mg per tablet   1 tab, Oral, QID, # 50 tab, 1 Refill(s), Pharmacy: [facility] #[id number], Chronic diarrhea of unknown origin, 165.1, cm, 06/28/22 13:29:00 CDT, Height/Length Measured, 76.5, kg, 06/28/22 13:29:00 CDT, Weight Dosing (Patient not taking: Reported on 4/25/2024)      DULoxetine (CYMBALTA) 20 MG capsule Take 1 capsule (20 mg total) by mouth 2 (two) times daily. 180 capsule 0    famotidine (PEPCID) 40 MG tablet Take 40 mg by mouth once daily. (Patient not taking: Reported on 4/25/2024)      emely, Zingiber officinalis, 250 mg Cap Emely (Patient not taking: Reported on 4/25/2024)      levothyroxine (SYNTHROID) 75 MCG tablet   = 1 tab, Oral, Daily, # 90 tab, 3 Refill(s), Maintenance, Pharmacy: [facility], 165, cm, 11/03/22 13:34:00 CDT, Height/Length Measured, 77.8, kg, 11/03/22 13:34:00 CDT, Weight Dosing (Patient not taking: Reported on 4/25/2024)      medical supply, miscellaneous (WALKER TIPS MISC)   walker with wheels, See Instructions, gait insstability, # 1 EA, 0 Refill(s) (Patient not taking: Reported on 4/25/2024)      pantoprazole (PROTONIX) 40 MG tablet  (Patient not taking: Reported on 4/25/2024)      rosuvastatin (CRESTOR) 5 MG tablet = 1 tab, Oral, HS, # 90 tab, 3 Refill(s), Maintenance, Pharmacy: [facility], 165, cm, 10/24/23 13:01:00 CDT, Height/Length Measured, 74.8, kg, 10/24/23 13:01:00 CDT, Weight Dosing (Patient not taking: Reported on 4/25/2024)      triamcinolone acetonide 0.1% (KENALOG) 0.1 % cream Apply topically 2 (two) times daily. (Patient not taking: Reported on 4/25/2024)       No current facility-administered medications for this  "visit.     Review of patient's allergies indicates:   Allergen Reactions    Bacitracin Itching    Gentamicin     Solifenacin     Sulfa (sulfonamide antibiotics)        Review of Systems   Musculoskeletal:  Negative for gait problem.   All other systems reviewed and are negative.      Objective:      Vitals:    04/25/24 1326   BP: 128/87   Pulse: 101   Resp: 18   Weight: 78.3 kg (172 lb 11.2 oz)   Height: 5' 5" (1.651 m)     Physical Exam  Vitals and nursing note reviewed.   Constitutional:       Appearance: Normal appearance.   Cardiovascular:      Pulses:           Dorsalis pedis pulses are 2+ on the right side and 2+ on the left side.        Posterior tibial pulses are 1+ on the right side and 1+ on the left side.   Pulmonary:      Effort: Pulmonary effort is normal.   Musculoskeletal:         General: Tenderness present.      Right foot: Deformity and bunion present.      Left foot: Deformity (Hammertoe 2nd digit right) and bunion present.      Comments: Hammertoe 2nd digit right is notably more pronounced at the PIPJ, mildly tender with no callus or calor   Feet:      Right foot:      Skin integrity: Skin integrity normal.      Toenail Condition: Right toenails are abnormally thick and long.      Left foot:      Skin integrity: Skin integrity normal.      Toenail Condition: Left toenails are abnormally thick and long.   Skin:     Capillary Refill: Capillary refill takes 2 to 3 seconds.   Neurological:      General: No focal deficit present.   Psychiatric:         Thought Content: Thought content normal.                    Assessment:       1. Hammer toe of second toe of right foot    2. Neuropathy of both feet    3. Type II diabetes mellitus, well controlled                Plan:         Advised patient does appear arthritis has progressed in this toe and she needs to be careful shoes have a large enough toe box to avoid rubbing  Reviewed appropriate shoes  Reviewed neuropathy at length and its connection to her " DDD, lumbar radiculopathy.  Advised patient arthritis in her back may be contributing or to neuropathy and balance than her diabetes  We reviewed appropriate shoes indoors to help with stability and balance  Reviewed reviewed arch supports  Reviewed stretching  For arthritis and wilmer reviewed application of Voltaren gel as needed for joint pain in feet  Mild callus debrided 1st met head right foot without complication  Nails debrided.  Reviewed care and maintenance of skin and nails  Reviewed diabetic education and the need to check feet daily, contact office with any area of concern which has not improved in a few days  Patient was in understanding and agreement with treatment plan.  I counseled the patient on their conditions, implications and medical management.  Instructed patient to contact the office with any changes, questions, concerns, worsening of symptoms.   Total face to face time 20 minutes, exam, assessment, treatment, discussion, additional time for review of chart prior to and following appointment and visit documentation, consultation and coordination of care.   Follow up prn 3 months    This note was created using [employer] voice recognition software that occasionally misinterpreted phrases or words.

## 2024-05-17 ENCOUNTER — CLINICAL SUPPORT (OUTPATIENT)
Dept: REHABILITATION | Facility: HOSPITAL | Age: 84
End: 2024-05-17
Payer: MEDICARE

## 2024-05-17 DIAGNOSIS — Z74.09 IMPAIRED FUNCTIONAL MOBILITY, BALANCE, GAIT, AND ENDURANCE: Primary | ICD-10-CM

## 2024-05-17 DIAGNOSIS — M15.0 PRIMARY GENERALIZED (OSTEO)ARTHRITIS: ICD-10-CM

## 2024-05-17 DIAGNOSIS — M54.50 LOW BACK PAIN: ICD-10-CM

## 2024-05-17 PROCEDURE — 97161 PT EVAL LOW COMPLEX 20 MIN: CPT | Mod: PN

## 2024-05-17 NOTE — PLAN OF CARE
[facility]   Physical Therapy Initial Evaluation      Name: Gardenia Dent  [facility] Number: [id number]    Therapy Diagnosis:   Encounter Diagnoses   Name Primary?    Low back pain     Primary generalized (osteo)arthritis     Impaired functional mobility, balance, gait, and endurance Yes        Physician: Oxana Greene MD    Physician Orders: PT Eval and Treat   Medical Diagnosis from Referral:   M54.50 (ICD-10-CM) - Low back pain   M15.0 (ICD-10-CM) - Primary generalized (osteo)arthritis     Evaluation Date: 5/17/2024  Authorization Period Expiration: 12/31/2024  Plan of Care Expiration: 8/17/2024  Progress Note Due: 6/17/2024  Date of Surgery: n/a   Visit # / Visits authorized: 1/ 1   FOTO: 1/ 3    Precautions: Standard     Time In: 1:45 pm   Time Out: 2:40 pm   Total Billable Time: 55 minutes    Subjective     Date of onset: Chronic history of lower back pain     History of current condition - Gardenia reports: I'm just not moving very well any more.  I'm still having trouble with this left foot - I don't always pick it up like I should and it causes me to trip sometimes.  My back still bothers me a good bit - just back pian, thank goodness I don't have leg pain right now.  I don't really do much in the way of exercise anymore, even though I know I should.  I have the recumbent bike at home, but I just don't use it.  Also has the inversion table (from her ) but I'm not able to handle this. I use the Rolator at home to get around - this way I can use it to carry things - this is safer for me.      Falls: no falls     Imaging: : nothing recent     Prior Therapy: Nothing recent - was here about a year ago for similar diagnosis - but only completed the evaluation.   Social History: lives alone    Occupation: retired   Prior Level of Function: Mod Independent - uses Rolator at home, has cane as well;   Current Level of Function: uses a RW inside the house - this way she can carry  things safely; keeps a walker in the car in case she needs one; also has a cane;   Independent in her home; does have a  that helps her with a lot of things; Driving, goes to grocery store; unable to play cards for social situations secondary to lower back pain/sitting and sciatica.  Does not do any type of exercise on a regular basis.     Pain:  Current 2/10, worst 7-8/10, best 1/10   Location:   midline lower back; denies leg pain today, but does get left sided pain sometimes.   Description: Aching, Grabbing, and Tight  Aggravating Factors: Standing, Walking, and Lifting  Easing Factors: heating pad and rest    Patients goals: To improve my strength and balance.      Medical History:   Past Medical History:   Diagnosis Date    Anxiety     Asthma     Depression     Dyslipidemia     GERD (gastroesophageal reflux disease)     Hypothyroidism     Lumbar disc disease with radiculopathy     Type 2 diabetes mellitus 03/23/2021       Surgical History:   Gardenia Dent  has a past surgical history that includes Hysterectomy; Total knee arthroplasty; Wrist surgery; Hand surgery; Hip surgery; and Sinus surgery.    Medications:   Gardenia has a current medication list which includes the following prescription(s): alprazolam, ascorbic acid, blood glucose control, high, blood sugar diagnostic, calcium-vitamin d3, colestipol, duloxetine, escitalopram oxalate, levothyroxine, claritin-d 24 hour, magnesium oxide, multivit with minerals/lutein, omeprazole, pantoprazole, rosuvastatin, turmeric (bulk), and zinc acetate.    Allergies:   Review of patient's allergies indicates:   Allergen Reactions    Bacitracin Itching    Gentamicin     Solifenacin     Sulfa (sulfonamide antibiotics)         Objective      Observation: Gardenia is alert/oriented  x 4; She is in no acute distress at this time; Not using an AD at this shyann.e     Posture:    -       Rounded shoulders  -       Forward head  -       Posterior pelvic tilt  -        "Kyphosis    Gait: antalgic, slow, gait pattern; Noted shoes - well worn, look too small with not enough room in the toe box for her feet.  She is aware of this and knows she needs some new ones.      Lumbar Range of Motion:    Active  Range Pain   Flexion Hands to mid shins     Extension Minimal     Left Side Bending Hand to mid thigh     Right Side Bending Hand to mid thigh     Left rotation 40%      Right Rotation 30%        Lower Extremity Strength   Left Right   Knee extension: 4-/5 4-/5   Knee flexion: 3+/5 3+/5   Hip flexion: 4/5 4/5   Hip extension:  3+/5 3+/5   Hip abduction: 3+/5 3+/5   Hip adduction: 3/5 3/5   Ankle dorsiflexion: 3+/5 4-/5   Ankle plantarflexion: 3+/5 3+/5   Upper abdominals 3/5    Lower abdominals 3-/5    Back extensors 3/5      Special Tests:    Repeated Flexion Negative   Repeated Extension Negative   Prone Instability Not tested    Straight Leg Raise Negative   Slump Negative   Quadrant Negative   Femoral nerve test Not tested        DTR:   Left Right Comment   Patellar (L3-4) 1+ 1+    Achilles (S1) 1+ 1+        Joint Mobility:   Lumbar: general restriction in lumbar segmental flexion.  Myofascial tightness noted as well in lumbar fascia.     Thoracic: postural restriction in thoracic extension/rotation noted     Functional Mobility:    30 sec sit <> stand:  7 reps   SLS - able to attempt, but cannot maintain   Tandem Stance: cannot attain, but can bring foot past other one and maintain balance   Toe-Taps; 6" step - able to perform with ok weight shift as long as she uses her Ue's for support;  Without Ue's - pace is slow; trouble clearing left foot, but no LOB.       Palpation: minimal tenderness to palpation at lumbar fascia;     Sensation: intact to light touch, patient does have neuropathy bilateral lower legs/feet;     Flexibility:     90/90 SLR = R moderate restriction, L moderate restriction    Joe test: R moderate restriction, L moderate restriction    PT Evaluation " Completed: Yes  Discussed Plan of Care with patient: Yes    Intake Outcome Measure for FOTO Lumbar  Survey    Therapist reviewed FOTO scores for Gardenia Dent on 5/17/2024.   FOTO report - see Media section or FOTO account episode details.    Intake Score: 37 %         Treatment     Total Treatment time (time-based codes) separate from Evaluation: 15 minutes     Gardenia received the treatments listed below:      therapeutic exercises to develop strength, endurance, and flexibility for 15 minutes including:  Nu-Step: Level 4 x 15 mins     Patient Education and Home Exercises     Education provided:   - Role of PT; POC; Need to increase level of activity.     Written Home Exercises Provided: No HEP today; will develop over next few sessions. Gardenia demonstrated good  understanding of the education provided.    Assessment     Gardenia is a 84 y.o. female referred to outpatient Physical Therapy with a medical diagnosis of Chronic lumbar pain. Patient presents with long history of lumbar pain with sciatica in LLE in the past - but not recent; Patient demonstrates general loss of flexibility, mm strength in core/gluteals and LE's as result of sedentary lifestyle.  She has history of neuropathy - affecting bilateral lower legs/feet. Discussed footwear with patient - needs to find some tennis shoes with larger toe box to reduce cramping of toes and offer improved balance/reduction in pain in her LE's.  Patient voiced understanding of this.  Gardenia will benefit from Skilled Physical Therapy Intervention to address back pain, myofascial restrictions and general deconditioning/reduced strength and functional mobility to improve her quality of life and reduce fall risk.     Patient prognosis is Good.   Patient will benefit from skilled outpatient Physical Therapy to address the deficits stated above and in the chart below, provide patient /family education, and to maximize patientt's level of independence.     Plan of care  discussed with patient: Yes  Patient's spiritual, cultural and educational needs considered and patient is agreeable to the plan of care and goals as stated below:     Anticipated Barriers for therapy: none     Medical Necessity is demonstrated by the following  History  Co-morbidities and personal factors that may impact the plan of care [] LOW: no personal factors / co-morbidities  [x] MODERATE: 1-2 personal factors / co-morbidities  [] HIGH: 3+ personal factors / co-morbidities    Moderate / High Support Documentation:   Co-morbidities affecting plan of care: diabetes, depression, chronic pain     Personal Factors:   lifestyle     Examination  Body Structures and Functions, activity limitations and participation restrictions that may impact the plan of care [x] LOW: addressing 1-2 elements  [] MODERATE: 3+ elements  [] HIGH: 4+ elements (please support below)    Moderate / High Support Documentation: n/a      Clinical Presentation [x] LOW: stable  [] MODERATE: Evolving  [] HIGH: Unstable     Decision Making/ Complexity Score: low       Goals:  Short Term Goals: 3 weeks   Demonstrate improvement in recent symptoms to progress toward long term goals.   Correct sitting/standing postural deficits to reduce pain and promote postural awareness for injury prevention.   Demonstrate compliance with initial exercise program.     Long Term Goals: 6 weeks   Able to perform household activities with improved tolerance   Able to transfers from all surfaces without limitation.   Able to ambulate community required distances with no limitation.   No reported falls during therapy session.   FOTO score improvement to 55  Independent with HEP for continued improvement in function.     Plan     Plan of care Certification: 5/17/2024 to 8/17/2024.    Outpatient Physical Therapy 2 times weekly for 6 weeks to include the following interventions: Gait Training, Manual Therapy, Neuromuscular Re-ed, Patient Education, Therapeutic  Activities, and Therapeutic Exercise.     Ramona Ron PT        Physician's Signature: _________________________________________ Date: ________________

## 2024-05-20 ENCOUNTER — CLINICAL SUPPORT (OUTPATIENT)
Dept: REHABILITATION | Facility: HOSPITAL | Age: 84
End: 2024-05-20
Payer: MEDICARE

## 2024-05-20 DIAGNOSIS — Z74.09 IMPAIRED FUNCTIONAL MOBILITY, BALANCE, GAIT, AND ENDURANCE: Primary | ICD-10-CM

## 2024-05-20 PROCEDURE — 97530 THERAPEUTIC ACTIVITIES: CPT | Mod: PN

## 2024-05-20 PROCEDURE — 97110 THERAPEUTIC EXERCISES: CPT | Mod: PN

## 2024-05-20 NOTE — PROGRESS NOTES
"[facility]   Physical Therapy Treatment Note      Name: Gardenia Dent  Clinic Number: [id number]    Therapy Diagnosis:   Encounter Diagnosis   Name Primary?    Impaired functional mobility, balance, gait, and endurance Yes     Physician: Oxana Greene MD    Visit Date: 5/20/2024    Physician Orders: PT Eval and Treat   Medical Diagnosis from Referral:   M54.50 (ICD-10-CM) - Low back pain   M15.0 (ICD-10-CM) - Primary generalized (osteo)arthritis      Evaluation Date: 5/17/2024  Authorization Period Expiration: 12/31/2024  Plan of Care Expiration: 8/17/2024  Progress Note Due: 6/17/2024  Date of Surgery: n/a   Visit # / Visits authorized: 1/ 12  FOTO: 1/ 3     Precautions: Standard      Time In: 2:30  pm   Time Out: 3:25  pm   Total Billable Time: 45  minutes       PTA Visit #: 0/5       Subjective     Patient reports: I brought my walker today, I was having a little trouble with my left foot. .  She was compliant with home exercise program.  Response to previous treatment: eval   Functional change: none     Pain: 2/10  Location: bilateral lower back      Objective      Objective Measures updated at progress report unless specified.     Treatment     Gardenia received the treatments listed below:      therapeutic exercises to develop strength, endurance, ROM, and flexibility for 25 minutes including:  Nu-Step - Level 5 x 15 mins   DKC with use of SB x 2 mins   Lumbar rotation - feet together x 2 mins   Posterior pelvic tilt 5"h x 10  Bridges x 15   SLR 10 x each LE  SAQ's over small gray roll - 3# x 2 mins       manual therapy techniques:  were applied to the:  for  minutes, including:      neuromuscular re-education activities to improve:  for  minutes. The following activities were included:      therapeutic activities to improve functional performance for 20  minutes, including:  Achilles stretch - wedge 30 sec x 2  Heel raises x 15   Toe-Taps: 6" step x 2 mins - minimize use of " "hands for balance   Step-ups: 6" step 15 times each leg - single hand assist     gait training to improve functional mobility and safety for   minutes, including:    Patient Education and Home Exercises       Education provided:   - need to challenge balance in order to improve; practice clearing feet with household walking.     Written Home Exercises Provided:  Will develop over next few session. .   Gardenia demonstrated good  understanding of the education provided.     Assessment     Gardenia demonstrated good performance of above activities on her initial session.  She has been sedentary over the past year since her  passed away; difficulty sleeping at night, feels like she just doesn't get enough rest.  Using Rolator at home - fear of falling, also offers her a place to put things so that she doesn't have to carry them - less risk of falling.  Will progress her activities for balance as well as general conditioning / endurance over the next 6 weeks.     Gardenia Is progressing well towards her goals.   Patient prognosis is Good.     Patient will continue to benefit from skilled outpatient physical therapy to address the deficits listed in the problem list box on initial evaluation, provide pt/family education and to maximize pt's level of independence in the home and community environment.     Patient's spiritual, cultural and educational needs considered and pt agreeable to plan of care and goals.     Anticipated barriers to physical therapy: none     Goals:   Short Term Goals: 3 weeks   Demonstrate improvement in recent symptoms to progress toward long term goals.   Correct sitting/standing postural deficits to reduce pain and promote postural awareness for injury prevention.   Demonstrate compliance with initial exercise program.      Long Term Goals: 6 weeks   Able to perform household activities with improved tolerance   Able to transfers from all surfaces without limitation.   Able to ambulate community " required distances with no limitation.   No reported falls during therapy session.   FOTO score improvement to 55  Independent with HEP for continued improvement in function.     Plan     Plan of care Certification: 5/17/2024 to 8/17/2024.     Outpatient Physical Therapy 2 times weekly for 6 weeks to include the following interventions: Gait Training, Manual Therapy, Neuromuscular Re-ed, Patient Education, Therapeutic Activities, and Therapeutic Exercise.        Ramona Ron, PT

## 2024-05-23 ENCOUNTER — CLINICAL SUPPORT (OUTPATIENT)
Dept: REHABILITATION | Facility: HOSPITAL | Age: 84
End: 2024-05-23
Payer: MEDICARE

## 2024-05-23 DIAGNOSIS — Z74.09 IMPAIRED FUNCTIONAL MOBILITY, BALANCE, GAIT, AND ENDURANCE: Primary | ICD-10-CM

## 2024-05-23 PROCEDURE — 97110 THERAPEUTIC EXERCISES: CPT | Mod: PN,CQ

## 2024-05-23 PROCEDURE — 97530 THERAPEUTIC ACTIVITIES: CPT | Mod: PN,CQ

## 2024-05-23 NOTE — PROGRESS NOTES
"[facility]   Physical Therapy Treatment Note      Name: Gardenia Dent  Clinic Number: [id number]    Therapy Diagnosis:   Encounter Diagnosis   Name Primary?    Impaired functional mobility, balance, gait, and endurance Yes     Physician: Oxana Greene MD    Visit Date: 5/23/2024    Physician Orders: PT Eval and Treat   Medical Diagnosis from Referral:   M54.50 (ICD-10-CM) - Low back pain   M15.0 (ICD-10-CM) - Primary generalized (osteo)arthritis      Evaluation Date: 5/17/2024  Authorization Period Expiration: 12/31/2024  Plan of Care Expiration: 8/17/2024  Progress Note Due: 6/17/2024  Date of Surgery: n/a   Visit # / Visits authorized: 2/ 12  FOTO: 1/ 3     Precautions: Standard      Time In: 1:30  pm   Time Out: 2:10  pm   Total Billable Time: 40  minutes       PTA Visit #: 1/5       Subjective     Patient reports: a little more back pain today.    She was compliant with home exercise program.  Response to previous treatment: eval   Functional change: none     Pain: 4-5/10  Location: bilateral lower back      Objective      Objective Measures updated at progress report unless specified.     Treatment     Gardenia received the treatments listed below:      therapeutic exercises to develop strength, endurance, ROM, and flexibility for 25 minutes including:  Nu-Step - Level 5 x 15 mins   DKC with use of SB x 2 mins   Lumbar rotation - feet together x 2 mins   Posterior pelvic tilt 5"h x 10  Bridges x 15   SLR 10 x each LE  SAQ's over small gray roll - 3# x 2 mins       manual therapy techniques:  were applied to the:  for  minutes, including:      neuromuscular re-education activities to improve:  for  minutes. The following activities were included:      therapeutic activities to improve functional performance for 15  minutes, including:  Achilles stretch - wedge 30 sec x 2  Heel raises x 15   Toe-Taps: 6" step x 2 mins - minimize use of hands for balance   Step-ups: 6" step 15 times " each leg - single hand assist     gait training to improve functional mobility and safety for   minutes, including:    Patient Education and Home Exercises       Education provided:   - need to challenge balance in order to improve; practice clearing feet with household walking.     Written Home Exercises Provided:  Will develop over next few session. .   Gardenia demonstrated good  understanding of the education provided.     Assessment     Gardenia did good today.   She has been sedentary over the past year since her  passed away; difficulty sleeping at night, feels like she just doesn't get enough rest.  Using Rolator at home - fear of falling, also offers her a place to put things so that she doesn't have to carry them - less risk of falling.  Will progress her activities for balance as well as general conditioning / endurance over the next 6 weeks.     Gardenia Is progressing well towards her goals.   Patient prognosis is Good.     Patient will continue to benefit from skilled outpatient physical therapy to address the deficits listed in the problem list box on initial evaluation, provide pt/family education and to maximize pt's level of independence in the home and community environment.     Patient's spiritual, cultural and educational needs considered and pt agreeable to plan of care and goals.     Anticipated barriers to physical therapy: none     Goals:   Short Term Goals: 3 weeks   Demonstrate improvement in recent symptoms to progress toward long term goals.   Correct sitting/standing postural deficits to reduce pain and promote postural awareness for injury prevention.   Demonstrate compliance with initial exercise program.      Long Term Goals: 6 weeks   Able to perform household activities with improved tolerance   Able to transfers from all surfaces without limitation.   Able to ambulate community required distances with no limitation.   No reported falls during therapy session.   FOTO score  improvement to 55  Independent with HEP for continued improvement in function.     Plan     Plan of care Certification: 5/17/2024 to 8/17/2024.     Outpatient Physical Therapy 2 times weekly for 6 weeks to include the following interventions: Gait Training, Manual Therapy, Neuromuscular Re-ed, Patient Education, Therapeutic Activities, and Therapeutic Exercise.        Luis Omer, PTA

## 2024-05-31 ENCOUNTER — CLINICAL SUPPORT (OUTPATIENT)
Dept: REHABILITATION | Facility: HOSPITAL | Age: 84
End: 2024-05-31
Payer: MEDICARE

## 2024-05-31 DIAGNOSIS — Z74.09 IMPAIRED FUNCTIONAL MOBILITY, BALANCE, GAIT, AND ENDURANCE: Primary | ICD-10-CM

## 2024-05-31 PROCEDURE — 97110 THERAPEUTIC EXERCISES: CPT | Mod: PN,CQ

## 2024-05-31 PROCEDURE — 97530 THERAPEUTIC ACTIVITIES: CPT | Mod: PN,CQ

## 2024-05-31 NOTE — PROGRESS NOTES
"[facility]   Physical Therapy Treatment Note      Name: Gardenia Dent  Clinic Number: [id number]    Therapy Diagnosis:   Encounter Diagnosis   Name Primary?    Impaired functional mobility, balance, gait, and endurance Yes     Physician: Oxana Greene MD    Visit Date: 5/31/2024    Physician Orders: PT Eval and Treat   Medical Diagnosis from Referral:   M54.50 (ICD-10-CM) - Low back pain   M15.0 (ICD-10-CM) - Primary generalized (osteo)arthritis      Evaluation Date: 5/17/2024  Authorization Period Expiration: 12/31/2024  Plan of Care Expiration: 8/17/2024  Progress Note Due: 6/17/2024  Date of Surgery: n/a   Visit # / Visits authorized: 3 / 12  FOTO: 1/ 3     Precautions: Standard      Time In: 1:45 PM  Time Out: 2:30 PM  Total Billable Time: 40 minutes       PTA Visit #: 2/5       Subjective     Patient reports: My back is really bothering me.   She was compliant with home exercise program.  Response to previous treatment: eval   Functional change: none     Pain: 10/10  Location: bilateral lower back      Objective      Objective Measures updated at progress report unless specified.     Treatment     Gardenia received the treatments listed below:      therapeutic exercises to develop strength, endurance, ROM, and flexibility for 25 minutes including:  Nu-Step - Level 5 x 15 mins   DKC with use of SB x 2 mins   Lumbar rotation - feet together x 2 mins   Posterior pelvic tilt 5"h x 10  Bridges x 15   SLR 10 x each LE  SAQ's over small gray roll - 3# x 2 mins     manual therapy techniques:  were applied to the:  for  minutes, including:    neuromuscular re-education activities to improve:  for  minutes. The following activities were included:    therapeutic activities to improve functional performance for 15 minutes, including:  Achilles stretch - wedge 30 sec x 3  Heel raises x 15   Toe-Taps: 6" step x 2 mins - minimize use of hands for balance   Step-ups: 6" step 15 times each leg - " single hand assist     gait training to improve functional mobility and safety for   minutes, including:    Patient Education and Home Exercises       Education provided:   - need to challenge balance in order to improve; practice clearing feet with household walking.     Written Home Exercises Provided:  Will develop over next few session. .   Gardenia demonstrated good  understanding of the education provided.     Assessment     Gardenia did good today.  She has been sedentary over the past year since her  passed away; difficulty sleeping at night, feels like she just doesn't get enough rest.  Using Rolator at home - fear of falling, also offers her a place to put things so that she doesn't have to carry them - less risk of falling.  Will progress her activities for balance as well as general conditioning / endurance over the next 6 weeks.     Gardenia Is progressing well towards her goals.   Patient prognosis is Good.     Patient will continue to benefit from skilled outpatient physical therapy to address the deficits listed in the problem list box on initial evaluation, provide pt/family education and to maximize pt's level of independence in the home and community environment.     Patient's spiritual, cultural and educational needs considered and pt agreeable to plan of care and goals.     Anticipated barriers to physical therapy: none     Goals:   Short Term Goals: 3 weeks   Demonstrate improvement in recent symptoms to progress toward long term goals.   Correct sitting/standing postural deficits to reduce pain and promote postural awareness for injury prevention.   Demonstrate compliance with initial exercise program.      Long Term Goals: 6 weeks   Able to perform household activities with improved tolerance   Able to transfers from all surfaces without limitation.   Able to ambulate community required distances with no limitation.   No reported falls during therapy session.   FOTO score improvement to  55  Independent with HEP for continued improvement in function.     Plan     Plan of care Certification: 5/17/2024 to 8/17/2024.     Outpatient Physical Therapy 2 times weekly for 6 weeks to include the following interventions: Gait Training, Manual Therapy, Neuromuscular Re-ed, Patient Education, Therapeutic Activities, and Therapeutic Exercise.        Jonathan Favre, PTA

## 2024-06-04 ENCOUNTER — CLINICAL SUPPORT (OUTPATIENT)
Dept: REHABILITATION | Facility: HOSPITAL | Age: 84
End: 2024-06-04
Payer: MEDICARE

## 2024-06-04 DIAGNOSIS — Z74.09 IMPAIRED FUNCTIONAL MOBILITY, BALANCE, GAIT, AND ENDURANCE: Primary | ICD-10-CM

## 2024-06-04 PROCEDURE — 97530 THERAPEUTIC ACTIVITIES: CPT | Mod: PN,CQ

## 2024-06-04 PROCEDURE — 97110 THERAPEUTIC EXERCISES: CPT | Mod: PN,CQ

## 2024-06-04 NOTE — PROGRESS NOTES
"[facility]   Physical Therapy Treatment Note      Name: Gardenia Dent  Clinic Number: [id number]    Therapy Diagnosis:   Encounter Diagnosis   Name Primary?    Impaired functional mobility, balance, gait, and endurance Yes     Physician: Oxana Greene MD    Visit Date: 6/4/2024    Physician Orders: PT Eval and Treat   Medical Diagnosis from Referral:   M54.50 (ICD-10-CM) - Low back pain   M15.0 (ICD-10-CM) - Primary generalized (osteo)arthritis      Evaluation Date: 5/17/2024  Authorization Period Expiration: 12/31/2024  Plan of Care Expiration: 8/17/2024  Progress Note Due: 6/17/2024  Date of Surgery: n/a   Visit # / Visits authorized: 3 / 12  FOTO: 1/ 3     Precautions: Standard      Time In: 340  Time Out: 420  Total Billable Time: 40 minutes       PTA Visit #: 3/5       Subjective     Patient reports: feeling much better today.  She was compliant with home exercise program.  Response to previous treatment: good  Functional change: none     Pain: 5/10  Location: bilateral lower back      Objective      Objective Measures updated at progress report unless specified.     Treatment     Gardenia received the treatments listed below:      therapeutic exercises to develop strength, endurance, ROM, and flexibility for 25 minutes including:  Nu-Step - Level 5 x 15 mins   DKC with use of SB x 2 mins   Lumbar rotation - feet together x 2 mins   Posterior pelvic tilt 5"h x 10  Bridges x 15   SLR 10 x each LE  SAQ's over small gray roll - 3# x 2 mins     manual therapy techniques:  were applied to the:  for  minutes, including:    neuromuscular re-education activities to improve:  for  minutes. The following activities were included:    therapeutic activities to improve functional performance for 15 minutes, including:  Achilles stretch - wedge 30 sec x 3  Heel raises x 15   Toe-Taps: 6" step x 2 mins - minimize use of hands for balance   Step-ups: 6" step 15 times each leg - single hand assist "     gait training to improve functional mobility and safety for   minutes, including:    Patient Education and Home Exercises       Education provided:   - need to challenge balance in order to improve; practice clearing feet with household walking.     Written Home Exercises Provided:  Will develop over next few session. .   Gardenia demonstrated good  understanding of the education provided.     Assessment     Gardenia did good today.  She has been sedentary over the past year since her  passed away; difficulty sleeping at night, feels like she just doesn't get enough rest.  Using Rolator at home - fear of falling, also offers her a place to put things so that she doesn't have to carry them - less risk of falling.  Will progress her activities for balance as well as general conditioning / endurance over the next 6 weeks.     Gardenia Is progressing well towards her goals.   Patient prognosis is Good.     Patient will continue to benefit from skilled outpatient physical therapy to address the deficits listed in the problem list box on initial evaluation, provide pt/family education and to maximize pt's level of independence in the home and community environment.     Patient's spiritual, cultural and educational needs considered and pt agreeable to plan of care and goals.     Anticipated barriers to physical therapy: none     Goals:   Short Term Goals: 3 weeks   Demonstrate improvement in recent symptoms to progress toward long term goals.   Correct sitting/standing postural deficits to reduce pain and promote postural awareness for injury prevention.   Demonstrate compliance with initial exercise program.      Long Term Goals: 6 weeks   Able to perform household activities with improved tolerance   Able to transfers from all surfaces without limitation.   Able to ambulate community required distances with no limitation.   No reported falls during therapy session.   FOTO score improvement to 55  Independent with  HEP for continued improvement in function.     Plan     Plan of care Certification: 5/17/2024 to 8/17/2024.     Outpatient Physical Therapy 2 times weekly for 6 weeks to include the following interventions: Gait Training, Manual Therapy, Neuromuscular Re-ed, Patient Education, Therapeutic Activities, and Therapeutic Exercise.        Luis Omer, PTA

## 2024-06-06 ENCOUNTER — CLINICAL SUPPORT (OUTPATIENT)
Dept: REHABILITATION | Facility: HOSPITAL | Age: 84
End: 2024-06-06
Payer: MEDICARE

## 2024-06-06 DIAGNOSIS — Z74.09 IMPAIRED FUNCTIONAL MOBILITY, BALANCE, GAIT, AND ENDURANCE: Primary | ICD-10-CM

## 2024-06-06 PROCEDURE — 97530 THERAPEUTIC ACTIVITIES: CPT | Mod: PN,CQ

## 2024-06-06 PROCEDURE — 97110 THERAPEUTIC EXERCISES: CPT | Mod: PN,CQ

## 2024-06-06 NOTE — PROGRESS NOTES
"[facility]   Physical Therapy Treatment Note      Name: Gardenia Dent  Clinic Number: [id number]    Therapy Diagnosis:   Encounter Diagnosis   Name Primary?    Impaired functional mobility, balance, gait, and endurance Yes     Physician: Oxana Greene MD    Visit Date: 6/6/2024    Physician Orders: PT Eval and Treat   Medical Diagnosis from Referral:   M54.50 (ICD-10-CM) - Low back pain   M15.0 (ICD-10-CM) - Primary generalized (osteo)arthritis      Evaluation Date: 5/17/2024  Authorization Period Expiration: 12/31/2024  Plan of Care Expiration: 8/17/2024  Progress Note Due: 6/17/2024  Date of Surgery: n/a   Visit # / Visits authorized: 5 / 12  FOTO: 2 / 3     Precautions: Standard      Time In: 1:45 PM  Time Out: 2:30 PM  Total Billable Time: 40 minutes       PTA Visit #: 4/5       Subjective     Patient reports: No new c/o's.   She was compliant with home exercise program.  Response to previous treatment: good  Functional change: none     Pain: 6/10  Location: bilateral lower back      Objective      Objective Measures updated at progress report unless specified.     Treatment     Gardenia received the treatments listed below:      therapeutic exercises to develop strength, endurance, ROM, and flexibility for 25 minutes including:  Nu-Step - Level 5 x 15 mins   DKC with use of SB x 2 mins   Lumbar rotation - feet together x 2 mins   Posterior pelvic tilt 5"h x 15  Bridges x 15   SLR 10 x each LE  SAQ's over small gray roll - 3# x 2 mins     manual therapy techniques:  were applied to the:  for  minutes, including:    neuromuscular re-education activities to improve:  for  minutes. The following activities were included:    therapeutic activities to improve functional performance for 15 minutes, including:  Achilles stretch - wedge 30 sec x 3  Heel raises x 15   Toe-Taps: 6" step x 2 mins - minimize use of hands for balance   Step-ups: 6" step 15 times each leg - single hand assist "     gait training to improve functional mobility and safety for   minutes, including:    Patient Education and Home Exercises       Education provided:   - need to challenge balance in order to improve; practice clearing feet with household walking.     Written Home Exercises Provided:  Will develop over next few session. .   Gardenia demonstrated good  understanding of the education provided.     Assessment     Gardenia did good today.  She has been sedentary over the past year since her  passed away; difficulty sleeping at night, feels like she just doesn't get enough rest.  Using Rolator at home - fear of falling, also offers her a place to put things so that she doesn't have to carry them - less risk of falling.  Will progress her activities for balance as well as general conditioning / endurance over the next 6 weeks.     Gardenia Is progressing well towards her goals.   Patient prognosis is Good.     Patient will continue to benefit from skilled outpatient physical therapy to address the deficits listed in the problem list box on initial evaluation, provide pt/family education and to maximize pt's level of independence in the home and community environment.     Patient's spiritual, cultural and educational needs considered and pt agreeable to plan of care and goals.     Anticipated barriers to physical therapy: none     Goals:   Short Term Goals: 3 weeks   Demonstrate improvement in recent symptoms to progress toward long term goals.   Correct sitting/standing postural deficits to reduce pain and promote postural awareness for injury prevention.   Demonstrate compliance with initial exercise program.      Long Term Goals: 6 weeks   Able to perform household activities with improved tolerance   Able to transfers from all surfaces without limitation.   Able to ambulate community required distances with no limitation.   No reported falls during therapy session.   FOTO score improvement to 55  Independent with  HEP for continued improvement in function.     Plan     Plan of care Certification: 5/17/2024 to 8/17/2024.     Outpatient Physical Therapy 2 times weekly for 6 weeks to include the following interventions: Gait Training, Manual Therapy, Neuromuscular Re-ed, Patient Education, Therapeutic Activities, and Therapeutic Exercise.        Jonathan Favre, PTA

## 2024-06-11 ENCOUNTER — CLINICAL SUPPORT (OUTPATIENT)
Dept: REHABILITATION | Facility: HOSPITAL | Age: 84
End: 2024-06-11
Payer: MEDICARE

## 2024-06-11 DIAGNOSIS — Z74.09 IMPAIRED FUNCTIONAL MOBILITY, BALANCE, GAIT, AND ENDURANCE: Primary | ICD-10-CM

## 2024-06-11 PROCEDURE — 97530 THERAPEUTIC ACTIVITIES: CPT | Mod: KX,PN,CQ

## 2024-06-11 PROCEDURE — 97110 THERAPEUTIC EXERCISES: CPT | Mod: KX,PN,CQ

## 2024-06-11 NOTE — PROGRESS NOTES
"[facility]   Physical Therapy Treatment Note      Name: Gardenia Dent  Clinic Number: [id number]    Therapy Diagnosis:   Encounter Diagnosis   Name Primary?    Impaired functional mobility, balance, gait, and endurance Yes     Physician: Oxana Greene MD    Visit Date: 6/11/2024    Physician Orders: PT Eval and Treat   Medical Diagnosis from Referral:   M54.50 (ICD-10-CM) - Low back pain   M15.0 (ICD-10-CM) - Primary generalized (osteo)arthritis      Evaluation Date: 5/17/2024  Authorization Period Expiration: 12/31/2024  Plan of Care Expiration: 8/17/2024  Progress Note Due: 6/17/2024  Date of Surgery: n/a   Visit # / Visits authorized: 6 / 12  FOTO: 2 / 3     Precautions: Standard      Time In: 1:15 PM  Time Out: 2:00 PM  Total Billable Time: 40 minutes       PTA Visit #: 5/5       Subjective     Patient reports: Feeling pretty good today.   She was compliant with home exercise program.  Response to previous treatment: good  Functional change: none     Pain: 3-4/10  Location: bilateral lower back      Objective      Objective Measures updated at progress report unless specified.     Treatment     Gardenia received the treatments listed below:      therapeutic exercises to develop strength, endurance, ROM, and flexibility for 25 minutes including:  Nu-Step - Level 5 x 15 mins   DKC with use of SB x 2 mins   Lumbar rotation - feet together x 2 mins   Posterior pelvic tilt 5"h x 15  Bridges x 15   SLR 10 x each LE  SAQ's over small gray roll - 3# x 2 mins     manual therapy techniques:  were applied to the:  for  minutes, including:    neuromuscular re-education activities to improve:  for  minutes. The following activities were included:    therapeutic activities to improve functional performance for 15 minutes, including:  Achilles stretch - wedge 30 sec x 3  Heel raises x 15   Toe-Taps: 6" step x 2 mins - minimize use of hands for balance   Step-ups: 6" step 15 times each leg - " single hand assist     gait training to improve functional mobility and safety for   minutes, including:    Patient Education and Home Exercises       Education provided:   - need to challenge balance in order to improve; practice clearing feet with household walking.     Written Home Exercises Provided:  Will develop over next few session. .   Gardenia demonstrated good  understanding of the education provided.     Assessment     Gardenia did good today.  She has been sedentary over the past year since her  passed away; difficulty sleeping at night, feels like she just doesn't get enough rest.  Using Rolator at home - fear of falling, also offers her a place to put things so that she doesn't have to carry them - less risk of falling.  Will progress her activities for balance as well as general conditioning / endurance over the next 6 weeks.     Gardenia Is progressing well towards her goals.   Patient prognosis is Good.     Patient will continue to benefit from skilled outpatient physical therapy to address the deficits listed in the problem list box on initial evaluation, provide pt/family education and to maximize pt's level of independence in the home and community environment.     Patient's spiritual, cultural and educational needs considered and pt agreeable to plan of care and goals.     Anticipated barriers to physical therapy: none     Goals:   Short Term Goals: 3 weeks   Demonstrate improvement in recent symptoms to progress toward long term goals.   Correct sitting/standing postural deficits to reduce pain and promote postural awareness for injury prevention.   Demonstrate compliance with initial exercise program.      Long Term Goals: 6 weeks   Able to perform household activities with improved tolerance   Able to transfers from all surfaces without limitation.   Able to ambulate community required distances with no limitation.   No reported falls during therapy session.   FOTO score improvement to  55  Independent with HEP for continued improvement in function.     Plan     Plan of care Certification: 5/17/2024 to 8/17/2024.     Outpatient Physical Therapy 2 times weekly for 6 weeks to include the following interventions: Gait Training, Manual Therapy, Neuromuscular Re-ed, Patient Education, Therapeutic Activities, and Therapeutic Exercise.        Jonathan Favre, PTA

## 2024-06-14 ENCOUNTER — CLINICAL SUPPORT (OUTPATIENT)
Dept: REHABILITATION | Facility: HOSPITAL | Age: 84
End: 2024-06-14
Payer: MEDICARE

## 2024-06-14 DIAGNOSIS — Z74.09 IMPAIRED FUNCTIONAL MOBILITY, BALANCE, GAIT, AND ENDURANCE: Primary | ICD-10-CM

## 2024-06-14 PROCEDURE — 97110 THERAPEUTIC EXERCISES: CPT | Mod: KX,PN,CQ

## 2024-06-14 PROCEDURE — 97530 THERAPEUTIC ACTIVITIES: CPT | Mod: KX,PN,CQ

## 2024-06-14 NOTE — PROGRESS NOTES
"[facility]   Physical Therapy Treatment Note      Name: Gardenia Dent  Clinic Number: [id number]    Therapy Diagnosis:   Encounter Diagnosis   Name Primary?    Impaired functional mobility, balance, gait, and endurance Yes     Physician: Oxana Greene MD    Visit Date: 6/14/2024    Physician Orders: PT Eval and Treat   Medical Diagnosis from Referral:   M54.50 (ICD-10-CM) - Low back pain   M15.0 (ICD-10-CM) - Primary generalized (osteo)arthritis      Evaluation Date: 5/17/2024  Authorization Period Expiration: 12/31/2024  Plan of Care Expiration: 8/17/2024  Progress Note Due: 6/17/2024  Date of Surgery: n/a   Visit # / Visits authorized: 7 / 12  FOTO: 2 / 3     Precautions: Standard      Time In: 2:00 PM  Time Out: 3:00 PM  Total Billable Time: 30 minutes- split time w/ another medicare patient       PTA Visit #: 5/5       Subjective     Patient reports: Feeling pretty good today.   She was compliant with home exercise program.  Response to previous treatment: good  Functional change: none     Pain: 3-4/10  Location: bilateral lower back      Objective      Objective Measures updated at progress report unless specified.     Treatment     Gardenia received the treatments listed below:      therapeutic exercises to develop strength, endurance, ROM, and flexibility for 30 minutes including:  Nu-Step - Level 5 x 15 mins   DKC with use of SB x 2 mins   Lumbar rotation - feet together x 2 mins   Posterior pelvic tilt 5"h x 15  Bridges x 15   Ball Squeezes x 2 min  SLR 10 x each LE  SAQ's over small gray roll - 3# x 2 mins     manual therapy techniques:  were applied to the:  for  minutes, including:    neuromuscular re-education activities to improve:  for  minutes. The following activities were included:    therapeutic activities to improve functional performance for 10 minutes, including:  Achilles stretch - wedge 30 sec x 3  Heel raises x 15   Toe-Taps: 6" step x 2 mins - minimize use of " "hands for balance   Step-ups: 6" step 15 times each leg - single hand assist     gait training to improve functional mobility and safety for   minutes, including:    Patient Education and Home Exercises       Education provided:   - need to challenge balance in order to improve; practice clearing feet with household walking.     Written Home Exercises Provided:  Will develop over next few session. .   Gardenia demonstrated good  understanding of the education provided.     Assessment     Gardenia did good today.  She has been sedentary over the past year since her  passed away; difficulty sleeping at night, feels like she just doesn't get enough rest.  Using Rolator at home - fear of falling, also offers her a place to put things so that she doesn't have to carry them - less risk of falling.  Will progress her activities for balance as well as general conditioning / endurance over the next 6 weeks.     Gardenia Is progressing well towards her goals.   Patient prognosis is Good.     Patient will continue to benefit from skilled outpatient physical therapy to address the deficits listed in the problem list box on initial evaluation, provide pt/family education and to maximize pt's level of independence in the home and community environment.     Patient's spiritual, cultural and educational needs considered and pt agreeable to plan of care and goals.     Anticipated barriers to physical therapy: none     Goals:   Short Term Goals: 3 weeks   Demonstrate improvement in recent symptoms to progress toward long term goals.   Correct sitting/standing postural deficits to reduce pain and promote postural awareness for injury prevention.   Demonstrate compliance with initial exercise program.      Long Term Goals: 6 weeks   Able to perform household activities with improved tolerance   Able to transfers from all surfaces without limitation.   Able to ambulate community required distances with no limitation.   No reported " falls during therapy session.   FOTO score improvement to 55  Independent with HEP for continued improvement in function.     Plan     Plan of care Certification: 5/17/2024 to 8/17/2024.     Outpatient Physical Therapy 2 times weekly for 6 weeks to include the following interventions: Gait Training, Manual Therapy, Neuromuscular Re-ed, Patient Education, Therapeutic Activities, and Therapeutic Exercise.        Jonathan Favre, PTA

## 2024-06-21 ENCOUNTER — CLINICAL SUPPORT (OUTPATIENT)
Dept: REHABILITATION | Facility: HOSPITAL | Age: 84
End: 2024-06-21
Payer: MEDICARE

## 2024-06-21 DIAGNOSIS — Z74.09 IMPAIRED FUNCTIONAL MOBILITY, BALANCE, GAIT, AND ENDURANCE: Primary | ICD-10-CM

## 2024-06-21 PROCEDURE — 97110 THERAPEUTIC EXERCISES: CPT | Mod: KX,PN

## 2024-06-21 PROCEDURE — 97140 MANUAL THERAPY 1/> REGIONS: CPT | Mod: KX,PN

## 2024-06-21 PROCEDURE — 97530 THERAPEUTIC ACTIVITIES: CPT | Mod: KX,PN

## 2024-06-21 NOTE — PROGRESS NOTES
"[facility]   Physical Therapy Treatment Note      Name: Gardenia Dent  Clinic Number: [id number]    Therapy Diagnosis:   Encounter Diagnosis   Name Primary?    Impaired functional mobility, balance, gait, and endurance Yes     Physician: Oxana Greene MD    Visit Date: 6/21/2024    Physician Orders: PT Eval and Treat   Medical Diagnosis from Referral:   M54.50 (ICD-10-CM) - Low back pain   M15.0 (ICD-10-CM) - Primary generalized (osteo)arthritis      Evaluation Date: 5/17/2024  Authorization Period Expiration: 12/31/2024  Plan of Care Expiration: 8/17/2024  Progress Note Due: 7/21/2024   Date of Surgery: n/a   Visit # / Visits authorized: 8 / 12  FOTO: 2 / 3     Precautions: Standard      Time In: 2:00 PM  Time Out: 2:50  PM  Total Billable Time:        PTA Visit #: 0/5      Subjective     Patient reports: "My back is really hurting" Some in my left shoulder as well;  I went to see Dr. Greene yesterday, she gave me a new order for you to work on my back.   She was compliant with home exercise program.  Response to previous treatment: good  Functional change: having increased lower back and left scapular/shoulder pain today;     Pain: 3-4/10  Location: bilateral lower back      Objective      Objective Measures updated at progress report unless specified.     Treatment     Gardenia received the treatments listed below:      therapeutic exercises to develop strength, endurance, ROM, and flexibility for 15 minutes including:  Nu-Step - Level 5 x 15 mins   DKC with use of SB x 2 mins   Lumbar rotation - feet together x 2 mins   Posterior pelvic tilt 5"h x 15  Bridges x 15   Ball Squeezes x 2 min  SLR 10 x each LE  SAQ's over small gray roll - 3# x 2 mins     manual therapy techniques:  were applied to the: Left shoulder, lumbar spine Left over Right side for 201  minutes, including:  S/L on right - mobilization of left scapula into all planes, passive ROM left shoulder, thoracic rotation; " "Lumbar- segmental flexion mobilization for facet movement and reduction of compression; Lamina release in S/L; patient moved into prone - head/neck pillow for support; IASTM to lumbar fascia with paraspinal skin rolling.     neuromuscular re-education activities to improve:  for  minutes. The following activities were included:    therapeutic activities to improve functional performance for 10 minutes, including:  Achilles stretch - wedge 30 sec x 3  Heel raises x 15   Toe-Taps: 6" step x 2 mins - minimize use of hands for balance   Step-ups: 6" step 15 times each leg - single hand assist     gait training to improve functional mobility and safety for   minutes, including:    Patient Education and Home Exercises       Education provided:   - need to challenge balance in order to improve; practice clearing feet with household walking.     Written Home Exercises Provided:  Will develop over next few session. .   Gardenia demonstrated good  understanding of the education provided.     Assessment     Gardenia responded well to manual therapy today; reduction in mm tension/pain in her lumbar spine; Able to complete the standing exercises without exacerbation;    Will progress her activities for balance as well as general conditioning / endurance over the next 6 weeks.     Gardenia Is progressing well towards her goals.   Patient prognosis is Good.     Patient will continue to benefit from skilled outpatient physical therapy to address the deficits listed in the problem list box on initial evaluation, provide pt/family education and to maximize pt's level of independence in the home and community environment.     Patient's spiritual, cultural and educational needs considered and pt agreeable to plan of care and goals.     Anticipated barriers to physical therapy: none     Goals:   Short Term Goals: 3 weeks   Demonstrate improvement in recent symptoms to progress toward long term goals.  > Progressing   Correct sitting/standing " postural deficits to reduce pain and promote postural awareness for injury prevention.  > Progressing   Demonstrate compliance with initial exercise program.  > Progressing      Long Term Goals: 6 weeks   Able to perform household activities with improved tolerance   Able to transfers from all surfaces without limitation.   Able to ambulate community required distances with no limitation.   No reported falls during therapy session.   FOTO score improvement to 55  Independent with HEP for continued improvement in function.     Plan     Plan of care Certification: 5/17/2024 to 8/17/2024.     Outpatient Physical Therapy 2 times weekly for 6 weeks to include the following interventions: Gait Training, Manual Therapy, Neuromuscular Re-ed, Patient Education, Therapeutic Activities, and Therapeutic Exercise.        Ramona Ron, PT

## 2024-06-24 ENCOUNTER — TELEPHONE (OUTPATIENT)
Dept: PODIATRY | Facility: CLINIC | Age: 84
End: 2024-06-24
Payer: MEDICARE

## 2024-06-25 ENCOUNTER — CLINICAL SUPPORT (OUTPATIENT)
Dept: REHABILITATION | Facility: HOSPITAL | Age: 84
End: 2024-06-25
Payer: MEDICARE

## 2024-06-25 DIAGNOSIS — Z74.09 IMPAIRED FUNCTIONAL MOBILITY, BALANCE, GAIT, AND ENDURANCE: Primary | ICD-10-CM

## 2024-06-25 PROCEDURE — 97530 THERAPEUTIC ACTIVITIES: CPT | Mod: PN

## 2024-06-25 PROCEDURE — 97110 THERAPEUTIC EXERCISES: CPT | Mod: PN

## 2024-06-25 PROCEDURE — 97140 MANUAL THERAPY 1/> REGIONS: CPT | Mod: PN

## 2024-06-25 NOTE — PROGRESS NOTES
"[facility]   Physical Therapy Treatment Note      Name: Gardenia Dent  Clinic Number: [id number]    Therapy Diagnosis:   Encounter Diagnosis   Name Primary?    Impaired functional mobility, balance, gait, and endurance Yes     Physician: Oxana Greene MD    Visit Date: 6/25/2024    Physician Orders: PT Eval and Treat   Medical Diagnosis from Referral:   M54.50 (ICD-10-CM) - Low back pain   M15.0 (ICD-10-CM) - Primary generalized (osteo)arthritis      Evaluation Date: 5/17/2024  Authorization Period Expiration: 12/31/2024  Plan of Care Expiration: 8/17/2024  Progress Note Due: 7/21/2024   Date of Surgery: n/a   Visit # / Visits authorized: 9 / 12  FOTO: 2 / 3     Precautions: Standard      Time In: 2:00 PM  Time Out: 3:00 PM  Total Billable Time:  55 mins        PTA Visit #: 0/5      Subjective     Patient reports: I feel so much better today than I did when I saw you on Friday;  I still have some trouble with my left foot though.   She was compliant with home exercise program.  Response to previous treatment: good  Functional change: having increased lower back and left scapular/shoulder pain today;     Pain: 3-4/10  Location: bilateral lower back      Objective      Objective Measures updated at progress report unless specified.     Treatment     Gardenia received the treatments listed below:      therapeutic exercises to develop strength, endurance, ROM, and flexibility for 25 minutes including:  Nu-Step - Level 5 x 15 mins   DKC with use of SB x 2 mins   Lumbar rotation - feet together x 2 mins   Posterior pelvic tilt 5"h x 15  Bridges x 15   Ball Squeezes x 2 min  SLR 10 x each LE  SAQ's over small gray roll - 3# x 2 mins     manual therapy techniques:  were applied to the: Left shoulder, lumbar spine Left over Right side for 15  minutes, including:  S/L on right - mobilization of left scapula into all planes, passive ROM left shoulder, thoracic rotation; Lumbar- segmental flexion " "mobilization for facet movement and reduction of compression; Lamina release in S/L; patient moved into prone - head/neck pillow for support; IASTM to lumbar fascia with paraspinal skin rolling.     neuromuscular re-education activities to improve:  for  minutes. The following activities were included:    therapeutic activities to improve functional performance for 13 minutes, including:  Achilles stretch - wedge 30 sec x 3  Heel raises x 15   Toe-Taps: 6" step x 2 mins - minimize use of hands for balance   Step-ups: 6" step 15 times each leg - single hand assist   Tandem stance x 30 sec switching feet   SLS - 30 sec with light hand assist x 2     gait training to improve functional mobility and safety for   minutes, including:    Patient Education and Home Exercises       Education provided:   - need to challenge balance in order to improve; practice clearing feet with household walking.     Written Home Exercises Provided:  Will develop over next few session. .   Gardenia demonstrated good  understanding of the education provided.     Assessment     Gardenia responded well to manual therapy today; reduction in mm tension/pain in her lumbar spine; Able to complete the standing exercises without exacerbation;    Will progress her activities for balance as well as general conditioning / endurance over the next 6 weeks.     Gardenia Is progressing well towards her goals.   Patient prognosis is Good.     Patient will continue to benefit from skilled outpatient physical therapy to address the deficits listed in the problem list box on initial evaluation, provide pt/family education and to maximize pt's level of independence in the home and community environment.     Patient's spiritual, cultural and educational needs considered and pt agreeable to plan of care and goals.     Anticipated barriers to physical therapy: none     Goals:   Short Term Goals: 3 weeks   Demonstrate improvement in recent symptoms to progress toward " long term goals.  > Progressing   Correct sitting/standing postural deficits to reduce pain and promote postural awareness for injury prevention.  > Progressing   Demonstrate compliance with initial exercise program.  > Progressing      Long Term Goals: 6 weeks   Able to perform household activities with improved tolerance   Able to transfers from all surfaces without limitation.   Able to ambulate community required distances with no limitation.   No reported falls during therapy session.   FOTO score improvement to 55  Independent with HEP for continued improvement in function.     Plan     Plan of care Certification: 5/17/2024 to 8/17/2024.     Outpatient Physical Therapy 2 times weekly for 6 weeks to include the following interventions: Gait Training, Manual Therapy, Neuromuscular Re-ed, Patient Education, Therapeutic Activities, and Therapeutic Exercise.        Ramona Ron, PT

## 2024-06-27 ENCOUNTER — CLINICAL SUPPORT (OUTPATIENT)
Dept: REHABILITATION | Facility: HOSPITAL | Age: 84
End: 2024-06-27
Payer: MEDICARE

## 2024-06-27 ENCOUNTER — OFFICE VISIT (OUTPATIENT)
Dept: PODIATRY | Facility: CLINIC | Age: 84
End: 2024-06-27
Payer: MEDICARE

## 2024-06-27 VITALS
HEIGHT: 65 IN | BODY MASS INDEX: 29.43 KG/M2 | HEART RATE: 75 BPM | DIASTOLIC BLOOD PRESSURE: 58 MMHG | RESPIRATION RATE: 18 BRPM | WEIGHT: 176.63 LBS | SYSTOLIC BLOOD PRESSURE: 127 MMHG

## 2024-06-27 DIAGNOSIS — M20.41 HAMMER TOE OF SECOND TOE OF RIGHT FOOT: ICD-10-CM

## 2024-06-27 DIAGNOSIS — Z74.09 IMPAIRED FUNCTIONAL MOBILITY, BALANCE, GAIT, AND ENDURANCE: Primary | ICD-10-CM

## 2024-06-27 DIAGNOSIS — E11.9 TYPE II DIABETES MELLITUS, WELL CONTROLLED: ICD-10-CM

## 2024-06-27 DIAGNOSIS — G57.93 NEUROPATHY OF BOTH FEET: Primary | ICD-10-CM

## 2024-06-27 PROCEDURE — 97110 THERAPEUTIC EXERCISES: CPT | Mod: KX,PN

## 2024-06-27 PROCEDURE — 97530 THERAPEUTIC ACTIVITIES: CPT | Mod: KX,PN

## 2024-06-27 PROCEDURE — 99213 OFFICE O/P EST LOW 20 MIN: CPT | Mod: S$PBB,,, | Performed by: PODIATRIST

## 2024-06-27 PROCEDURE — 99999 PR PBB SHADOW E&M-EST. PATIENT-LVL IV: CPT | Mod: PBBFAC,,, | Performed by: PODIATRIST

## 2024-06-27 PROCEDURE — 99214 OFFICE O/P EST MOD 30 MIN: CPT | Mod: PBBFAC | Performed by: PODIATRIST

## 2024-06-27 RX ORDER — DAPAGLIFLOZIN 5 MG/1
5 TABLET, FILM COATED ORAL
COMMUNITY
Start: 2024-06-20

## 2024-06-27 RX ORDER — GABAPENTIN 100 MG/1
CAPSULE ORAL
COMMUNITY
Start: 2024-06-20 | End: 2024-06-28

## 2024-06-27 NOTE — PROGRESS NOTES
"[facility]   Physical Therapy Treatment Note      Name: Gardenia Dent  Clinic Number: [id number]    Therapy Diagnosis:   Encounter Diagnosis   Name Primary?    Impaired functional mobility, balance, gait, and endurance Yes     Physician: Oxana Greene MD    Visit Date: 6/27/2024    Physician Orders: PT Eval and Treat   Medical Diagnosis from Referral:   M54.50 (ICD-10-CM) - Low back pain   M15.0 (ICD-10-CM) - Primary generalized (osteo)arthritis      Evaluation Date: 5/17/2024  Authorization Period Expiration: 12/31/2024  Plan of Care Expiration: 8/17/2024  Progress Note Due: 7/21/2024   Date of Surgery: n/a   Visit # / Visits authorized: 10 / 12  FOTO: 2 / 3     Precautions: Standard      Time In: 10:45 PM   Time Out: 11:20 PM  Total Billable Time:  30 mins        PTA Visit #: 0/5      Subjective     Patient reports: her back is hurting at this time.   She was compliant with home exercise program.  Response to previous treatment: good  Functional change: having increased lower back and left scapular/shoulder pain today;     Pain: 3-4/10  Location: bilateral lower back      Objective      Objective Measures updated at progress report unless specified.     Treatment     Gardenia received the treatments listed below:      therapeutic exercises to develop strength, endurance, ROM, and flexibility for 20 minutes including:  Nu-Step - Level 5 x 15 mins   DKC with use of SB x 2 mins   Lumbar rotation - feet together x 2 mins   Posterior pelvic tilt 5"h x 15  Bridges x 15   Ball Squeezes x 2 min  SLR 10 x each LE  SAQ's over small gray roll - 3# x 2 mins     manual therapy techniques:  were applied to the: Left shoulder, lumbar spine Left over Right side for 0  minutes, including:  S/L on right - mobilization of left scapula into all planes, passive ROM left shoulder, thoracic rotation; Lumbar- segmental flexion mobilization for facet movement and reduction of compression; Lamina release in " "S/L; patient moved into prone - head/neck pillow for support; IASTM to lumbar fascia with paraspinal skin rolling.     neuromuscular re-education activities to improve:  for  minutes. The following activities were included:    therapeutic activities to improve functional performance for 10 minutes, including:  Achilles stretch - wedge 30 sec x 3  Heel raises x 15   Toe-Taps: 6" step x 2 mins - minimize use of hands for balance   Step-ups: 6" step 15 times each leg - single hand assist   Tandem stance x 30 sec switching feet   SLS - 30 sec with light hand assist x 2     gait training to improve functional mobility and safety for   minutes, including:    Patient Education and Home Exercises       Education provided:   - need to challenge balance in order to improve; practice clearing feet with household walking.     Written Home Exercises Provided:  Will develop over next few session. .   Gardenia demonstrated good  understanding of the education provided.     Assessment     Gardenia did well with balance and strengthening at this time.     Gardenia Is progressing well towards her goals.   Patient prognosis is Good.     Patient will continue to benefit from skilled outpatient physical therapy to address the deficits listed in the problem list box on initial evaluation, provide pt/family education and to maximize pt's level of independence in the home and community environment.     Patient's spiritual, cultural and educational needs considered and pt agreeable to plan of care and goals.     Anticipated barriers to physical therapy: none     Goals:   Short Term Goals: 3 weeks   Demonstrate improvement in recent symptoms to progress toward long term goals.  > Progressing   Correct sitting/standing postural deficits to reduce pain and promote postural awareness for injury prevention.  > Progressing   Demonstrate compliance with initial exercise program.  > Progressing      Long Term Goals: 6 weeks   Able to perform household " activities with improved tolerance   Able to transfers from all surfaces without limitation.   Able to ambulate community required distances with no limitation.   No reported falls during therapy session.   FOTO score improvement to 55  Independent with HEP for continued improvement in function.     Plan     Plan of care Certification: 5/17/2024 to 8/17/2024.     Outpatient Physical Therapy 2 times weekly for 6 weeks to include the following interventions: Gait Training, Manual Therapy, Neuromuscular Re-ed, Patient Education, Therapeutic Activities, and Therapeutic Exercise.        Natalia Rahman, PT

## 2024-06-28 NOTE — PROGRESS NOTES
Subjective:       Patient ID: Gardenia Dent is a 84 y.o. female.    Chief Complaint: Follow-up, Hammer Toe, and Diabetes Mellitus  Patient presents for follow-up due to type 2 diabetes, hammertoe 2nd digit right foot.  Has been wearing wider shoes and open toed to avoid pressure on the hammertoe which has been doing well over the last few months.  Relates concerns regarding neuropathy and  admits she has been mostly sedentary, afraid of falling.   Relates numbness in her fingers.   PCP prescribed gabapentin low-dose, did not really take much of it  Relates diabetes remains well-controlled, son is going to help her set up her glucometer this weekend, she has not been checking it daily    Past Medical History:   Diagnosis Date    Anxiety     Asthma     Depression     Dyslipidemia     GERD (gastroesophageal reflux disease)     Hypothyroidism     Lumbar disc disease with radiculopathy     Type 2 diabetes mellitus 03/23/2021     Past Surgical History:   Procedure Laterality Date    HAND SURGERY      HIP SURGERY      HYSTERECTOMY      SINUS SURGERY      TOTAL KNEE ARTHROPLASTY      WRIST SURGERY       Family History   Problem Relation Name Age of Onset    Cancer Mother      Diabetes type II Mother      Heart disease Father      Heart disease Brother      Diabetes Brother      Diabetes type II Brother       Social History     Socioeconomic History    Marital status:    Tobacco Use    Smoking status: Never    Smokeless tobacco: Never   Substance and Sexual Activity    Alcohol use: No    Drug use: No    Sexual activity: Never       Current Outpatient Medications   Medication Sig Dispense Refill    ALPRAZolam (XANAX) 0.25 MG tablet 0.25 mg.      ascorbic Acid (VITAMIN C) 500 mg CpSR       blood glucose control, high Soln   one touch verio reflect test strips, See Instructions, use in glucose meter to check  bs daily, # 50 EA, 3 Refill(s), Pharmacy: [facility], 165.1, cm, 08/02/22 13:28:00 CDT,  Height/Length Measured, 77.8, kg, 08/02/22 13:28:00 CD...      blood sugar diagnostic Strp   one touch verio reflect test strips, See Instructions, use in glucose meter to check  bs daily, # 50 EA, 3 Refill(s), Pharmacy: [facility], 165, cm, 03/09/23 13:09:00 CST, Height/Length Measured, 74.1, kg, 03/09/23 13:09:00 CST, Weight Dosing      calcium-vitamin D3 (OS-ANITA 500 + D3) 500 mg-5 mcg (200 unit) per tablet Calcium 500 + D      dapagliflozin propanediol (FARXIGA) 5 mg Tab tablet 5 mg.      EScitalopram oxalate (LEXAPRO) 10 MG tablet 10 mg.      levothyroxine (SYNTHROID) 75 MCG tablet = 1 tab, Oral, Daily, # 90 tab, 3 Refill(s), Maintenance, Pharmacy: [facility], 165, cm, 10/24/23 13:01:00 CDT, Height/Length Measured, 74.8, kg, 10/24/23 13:01:00 CDT, Weight Dosing      loratadine-pseudoephedrine  mg (CLARITIN-D 24 HOUR)  mg per 24 hr tablet   1 tab, Oral, Daily, PRN as needed for congestion, # 30 tab, 1 Refill(s), Pharmacy: [id number], 165.1, cm, 09/10/20 9:52:00 CDT, Height/Length Measured, 76.8, kg, 09/10/20 9:52:00 CDT, Weight Dosing      magnesium oxide 500 mg Tab 500 mg.      multivit with minerals/lutein (MULTIVITAMIN 50 PLUS ORAL) multivitamin      omeprazole (PRILOSEC) 40 MG capsule 40 mg.      pantoprazole (PROTONIX) 40 MG tablet = 1 tab, Oral, Daily, # 90 tab, 3 Refill(s), Maintenance, Pharmacy: [id number], 165, cm, 10/24/23 13:01:00 CDT, Height/Length Measured, 74.8, kg, 10/24/23 13:01:00 CDT, Weight Dosing      rosuvastatin (CRESTOR) 5 MG tablet   See Instructions, TAKE 1 TABLET AT BEDTIME, # 90 tab, 3 Refill(s), Pharmacy: [facility] #[id number], 165.1, cm, 06/05/20 11:18:00 CDT, Height/Length Measured, Weight Dosing      turmeric, bulk, 100 % Powd turmeric (bulk)      zinc acetate 50 mg (zinc) Cap 50 mg.      colestipoL (COLESTID) 1 gram Tab 1 g.      DULoxetine (CYMBALTA) 20 MG capsule Take 1 capsule (20 mg total) by mouth 2 (two) times  "daily. 180 capsule 0     No current facility-administered medications for this visit.     Review of patient's allergies indicates:   Allergen Reactions    Bacitracin Itching    Gentamicin     Solifenacin     Sulfa (sulfonamide antibiotics)        Review of Systems   All other systems reviewed and are negative.      Objective:      Vitals:    06/27/24 1315   BP: (!) 127/58   Pulse: 75   Resp: 18   Weight: 80.1 kg (176 lb 9.6 oz)   Height: 5' 5" (1.651 m)     Physical Exam  Vitals and nursing note reviewed.   Constitutional:       Appearance: Normal appearance.   Cardiovascular:      Pulses:           Dorsalis pedis pulses are 1+ on the right side and 1+ on the left side.        Posterior tibial pulses are 1+ on the right side and 1+ on the left side.   Pulmonary:      Effort: Pulmonary effort is normal.   Musculoskeletal:         General: No tenderness.      Right foot: Deformity and bunion present.      Left foot: Deformity (Hammertoe 2nd digit right) and bunion present.      Comments: Limited mobility   Feet:      Right foot:      Skin integrity: Skin integrity normal.      Toenail Condition: Right toenails are long.      Left foot:      Skin integrity: Skin integrity normal.      Toenail Condition: Left toenails are abnormally thick and long.   Skin:     Capillary Refill: Capillary refill takes 2 to 3 seconds.   Neurological:      General: No focal deficit present.      Mental Status: She is alert.   Psychiatric:         Thought Content: Thought content normal.                  Assessment:       1. Neuropathy of both feet    2. Type II diabetes mellitus, well controlled    3. Hammer toe of second toe of right foot            Plan:           Reviewed neuropathy with patient, potential complications, fall risk and the need for her to consider using assistive device when walking  Advise diabetes may be contributing, other contributing factors DDD  arthritis on back and lumbar radiculopathy  We reviewed appropriate " shoes indoors to help with stability and balance  Reviewed reviewed how important it is to walk daily, even if it is in her home where she is more comfortable, walking is good for neuropathy, good for stretching, muscles,  and balance  Reviewed  additional stretching  Reviewed potential complications due to hammertoe  2nd digit right, absolutely needs to avoid shoes which could rub or cause callus or blister on this area and wereviewed application of Voltaren gel as needed for joint pain in feet  Reviewed care and maintenance of skin and nails,  nails debrided, thickness reduced  Reviewed diabetic education and the need to check feet daily, contact office with any area of concern which has not improved in a few days  Patient was in understanding and agreement with treatment plan.  I counseled the patient on their conditions, implications and medical management.  Instructed patient to contact the office with any changes, questions, concerns, worsening of symptoms.   Total face to face time 20 minutes, exam, assessment, treatment, discussion, additional time for review of chart prior to and following appointment and visit documentation, consultation and coordination of care.   Follow up prn 3 months    This note was created using M*Modal voice recognition software that occasionally misinterpreted phrases or words.

## 2024-07-02 ENCOUNTER — CLINICAL SUPPORT (OUTPATIENT)
Dept: REHABILITATION | Facility: HOSPITAL | Age: 84
End: 2024-07-02
Payer: MEDICARE

## 2024-07-02 DIAGNOSIS — Z74.09 IMPAIRED FUNCTIONAL MOBILITY, BALANCE, GAIT, AND ENDURANCE: Primary | ICD-10-CM

## 2024-07-02 PROCEDURE — 97110 THERAPEUTIC EXERCISES: CPT | Mod: KX,PN

## 2024-07-02 PROCEDURE — 97140 MANUAL THERAPY 1/> REGIONS: CPT | Mod: KX,PN

## 2024-07-02 NOTE — PROGRESS NOTES
"[facility]   Physical Therapy Treatment Note      Name: Gardenia ROWAN Saucier  Clinic Number: [id number]    Therapy Diagnosis:   Encounter Diagnosis   Name Primary?    Impaired functional mobility, balance, gait, and endurance Yes     Physician: Oxana Greene MD    Visit Date: 7/2/2024    Physician Orders: PT Eval and Treat   Medical Diagnosis from Referral:   M54.50 (ICD-10-CM) - Low back pain   M15.0 (ICD-10-CM) - Primary generalized (osteo)arthritis      Evaluation Date: 5/17/2024  Authorization Period Expiration: 12/31/2024  Plan of Care Expiration: 8/17/2024  Progress Note Due: 7/21/2024   Date of Surgery: n/a   Visit # / Visits authorized: 11/ 12  FOTO: 2 / 3     Precautions: Standard      Time In: 9:30 PM   Time Out: 10:30 PM  Total Billable Time:  30 mins - Double booked with another Medicare patient        PTA Visit #: 0/5      Subjective     Patient reports: I just feel so stiff in the mornings, my back is really hurting; Talked with patient about moving more at home; She said her son was here for the week and they are trying to get some equipment so that she can do some exercises on her own.   She was compliant with home exercise program.  Response to previous treatment: good  Functional change: having increased lower back and left scapular/shoulder pain today;     Pain:  8/10  Location: bilateral lower back      Objective      Objective Measures updated at progress report unless specified.     Treatment     Gardenia received the treatments listed below:      therapeutic exercises to develop strength, endurance, ROM, and flexibility for 20 minutes including:  Nu-Step - Level 5 x 15 mins   DKC with use of SB x 2 mins   Lumbar rotation - feet together x 2 mins   Posterior pelvic tilt 5"h x 15  Bridges x 15   Ball Squeezes x 2 min       manual therapy techniques:  were applied to the: Left shoulder, lumbar spine Left over Right side for 10 minutes, including:  S/L on right - " "mobilization of left scapula into all planes, passive ROM left shoulder, thoracic rotation; Lumbar- segmental flexion mobilization for facet movement and reduction of compression; Lamina release in S/L; patient moved into prone - head/neck pillow for support; IASTM to lumbar fascia with paraspinal skin rolling.     neuromuscular re-education activities to improve:  for  minutes. The following activities were included:    therapeutic activities to improve functional performance for 10 minutes, including:  Achilles stretch - wedge 30 sec x 3  Heel raises x 15   Toe-Taps: 6" step x 2 mins - minimize use of hands for balance   Step-ups: 6" step 15 times each leg - single hand assist   Tandem stance x 30 sec switching feet   SLS - 30 sec with light hand assist x 2     gait training to improve functional mobility and safety for   minutes, including:    Patient Education and Home Exercises       Education provided:   - discussed increasing her activity at home, talked about aquatic ex - she used to do this; too hot to walk outside,     Written Home Exercises Provided:  Will develop over next few session. .   Gardenia demonstrated good  understanding of the education provided.     Assessment     Gardenia continues to note high levels of back pain, stated that this is why she just can't do much anymore; Educated patient on viscous cycle this is because the less she moves, the more stiff and painful her back/legs will become; Need to work with her in order to find a way to increase her activity at home.     Gardenia Is progressing well towards her goals.   Patient prognosis is Good.     Patient will continue to benefit from skilled outpatient physical therapy to address the deficits listed in the problem list box on initial evaluation, provide pt/family education and to maximize pt's level of independence in the home and community environment.     Patient's spiritual, cultural and educational needs considered and pt agreeable to " plan of care and goals.     Anticipated barriers to physical therapy: none     Goals:   Short Term Goals: 3 weeks   Demonstrate improvement in recent symptoms to progress toward long term goals.  > Progressing   Correct sitting/standing postural deficits to reduce pain and promote postural awareness for injury prevention.  > Progressing   Demonstrate compliance with initial exercise program.  > Progressing      Long Term Goals: 6 weeks   Able to perform household activities with improved tolerance  > Slow progress   Able to transfers from all surfaces without limitation. > Slow progress   Able to ambulate community required distances with no limitation. > Slow progress   No reported falls during therapy session.   FOTO score improvement to 55  Independent with HEP for continued improvement in function.     Plan     Plan of care Certification: 5/17/2024 to 8/17/2024.     Outpatient Physical Therapy 2 times weekly for 6 weeks to include the following interventions: Gait Training, Manual Therapy, Neuromuscular Re-ed, Patient Education, Therapeutic Activities, and Therapeutic Exercise.        Ramona Ron, PT

## 2024-07-05 ENCOUNTER — CLINICAL SUPPORT (OUTPATIENT)
Dept: REHABILITATION | Facility: HOSPITAL | Age: 84
End: 2024-07-05
Payer: MEDICARE

## 2024-07-05 DIAGNOSIS — Z74.09 IMPAIRED FUNCTIONAL MOBILITY, BALANCE, GAIT, AND ENDURANCE: Primary | ICD-10-CM

## 2024-07-05 PROCEDURE — 97110 THERAPEUTIC EXERCISES: CPT | Mod: KX,PN

## 2024-07-05 PROCEDURE — 97140 MANUAL THERAPY 1/> REGIONS: CPT | Mod: KX,PN

## 2024-07-05 PROCEDURE — 97530 THERAPEUTIC ACTIVITIES: CPT | Mod: KX,PN

## 2024-07-05 NOTE — PROGRESS NOTES
"[facility]   Physical Therapy Treatment Note      Name: Gardenia Dent  Clinic Number: [id number]    Therapy Diagnosis:   Encounter Diagnosis   Name Primary?    Impaired functional mobility, balance, gait, and endurance Yes     Physician: Oxana Greene MD    Visit Date: 7/5/2024    Physician Orders: PT Eval and Treat   Medical Diagnosis from Referral:   M54.50 (ICD-10-CM) - Low back pain   M15.0 (ICD-10-CM) - Primary generalized (osteo)arthritis      Evaluation Date: 5/17/2024  Authorization Period Expiration: 12/31/2024  Plan of Care Expiration: 8/17/2024  Progress Note Due: 7/21/2024   Date of Surgery: n/a   Visit # / Visits authorized: 12/ 12  FOTO: 2 / 3     Precautions: Standard      Time In: 3:00 PM   Time Out: 3:45 PM  Total Billable Time:   43 min       PTA Visit #: 0/5      Subjective     Patient reports: Patient stated that her back and legs were bothering her today; she did report that she started riding her stationary bike at home - 20 min yesterday  She was compliant with home exercise program.  Response to previous treatment: good  Functional change: doesn't know why, but noting lower back and left knee, right thigh pain this afternoon.     Pain:  8/10  Location: bilateral lower back, anterior thighs      Objective      Objective Measures updated at progress report unless specified.     Treatment     Gardenia received the treatments listed below:      therapeutic exercises to develop strength, endurance, ROM, and flexibility for 25 minutes including:  Recumbent Bike x 5 mins Level 4 (Sci-Fit)   Nu-Step - Level 5 x 15 mins   DKC with use of SB x 2 mins   Lumbar rotation - feet together x 2 mins   Posterior pelvic tilt 5"h x 15  Bridges x 15   Ball Squeezes x 2 min  SLR 10 x 2  S/L hip abduction x 10  S/L clams x 15        manual therapy techniques:  were applied to the: Left shoulder, lumbar spine Left over Right side for 10 minutes, including:  S/L on right - mobilization " "of left scapula into all planes, passive ROM left shoulder, thoracic rotation; Lumbar- segmental flexion mobilization for facet movement and reduction of compression; Lamina release in S/L;    neuromuscular re-education activities to improve:  for  minutes. The following activities were included:    therapeutic activities to improve functional performance for 15 minutes, including:  Achilles stretch - wedge 30 sec x 3  Heel raises x 15   Toe-Taps: 6" step x 2 mins - minimize use of hands for balance   Step-ups: 6" step 15 times each leg - single hand assist   Open book x 10  Tandem stance x 30 sec switching feet   SLS - 30 sec with light hand assist x 2   Seated Lumbar flexion/side bending with use of SB x 3 mins     gait training to improve functional mobility and safety for   minutes, including:    Patient Education and Home Exercises       Education provided:   - discussed increasing her activity at home, talked about aquatic ex - she used to do this; too hot to walk outside,     Written Home Exercises Provided:  Gardenia given written HEP today with exercises performed above on the mat;   Recommended performing a few of these everyday; Gardenia demonstrated good  understanding of the education provided.     Assessment     Gardenia continues to note general lower back pain, today noting left knee and right thigh pain as well; She did start using her recumbent bike - rode 20 mins.; Does well with exercises above, and would greatly benefit from performing some of these at home in addition to using her bicycle to improve her ability to move better and facilitate fascia release in tissues.      Gardenia Is progressing well towards her goals.   Patient prognosis is Good.     Patient will continue to benefit from skilled outpatient physical therapy to address the deficits listed in the problem list box on initial evaluation, provide pt/family education and to maximize pt's level of independence in the home and community " environment.     Patient's spiritual, cultural and educational needs considered and pt agreeable to plan of care and goals.     Anticipated barriers to physical therapy: none     Goals:   Short Term Goals: 3 weeks   Demonstrate improvement in recent symptoms to progress toward long term goals.  > Progressing   Correct sitting/standing postural deficits to reduce pain and promote postural awareness for injury prevention.  > Progressing   Demonstrate compliance with initial exercise program.  > Progressing      Long Term Goals: 6 weeks   Able to perform household activities with improved tolerance  > Slow progress   Able to transfers from all surfaces without limitation. > Slow progress   Able to ambulate community required distances with no limitation. > Slow progress   No reported falls during therapy session.   FOTO score improvement to 55  Independent with HEP for continued improvement in function.     Plan     Plan of care Certification: 5/17/2024 to 8/17/2024.     Outpatient Physical Therapy 2 times weekly for 6 weeks to include the following interventions: Gait Training, Manual Therapy, Neuromuscular Re-ed, Patient Education, Therapeutic Activities, and Therapeutic Exercise.        Ramona Ron, PT

## 2024-07-11 ENCOUNTER — CLINICAL SUPPORT (OUTPATIENT)
Dept: REHABILITATION | Facility: HOSPITAL | Age: 84
End: 2024-07-11
Payer: MEDICARE

## 2024-07-11 DIAGNOSIS — Z74.09 IMPAIRED FUNCTIONAL MOBILITY, BALANCE, GAIT, AND ENDURANCE: Primary | ICD-10-CM

## 2024-07-11 PROCEDURE — 97140 MANUAL THERAPY 1/> REGIONS: CPT | Mod: KX,PN

## 2024-07-11 PROCEDURE — 97110 THERAPEUTIC EXERCISES: CPT | Mod: KX,PN

## 2024-07-11 PROCEDURE — 97530 THERAPEUTIC ACTIVITIES: CPT | Mod: KX,PN

## 2024-07-12 ENCOUNTER — CLINICAL SUPPORT (OUTPATIENT)
Dept: REHABILITATION | Facility: HOSPITAL | Age: 84
End: 2024-07-12
Payer: MEDICARE

## 2024-07-12 DIAGNOSIS — Z74.09 IMPAIRED FUNCTIONAL MOBILITY, BALANCE, GAIT, AND ENDURANCE: Primary | ICD-10-CM

## 2024-07-12 PROCEDURE — 97140 MANUAL THERAPY 1/> REGIONS: CPT | Mod: KX,PN

## 2024-07-12 PROCEDURE — 97530 THERAPEUTIC ACTIVITIES: CPT | Mod: KX,PN

## 2024-07-12 PROCEDURE — 97110 THERAPEUTIC EXERCISES: CPT | Mod: KX,PN

## 2024-07-12 NOTE — PROGRESS NOTES
"[facility]   Physical Therapy Treatment Note      Name: Gardenia Dent  Clinic Number: [id number]    Therapy Diagnosis:   Encounter Diagnosis   Name Primary?    Impaired functional mobility, balance, gait, and endurance Yes     Physician: Oxana Greene MD    Visit Date: 7/12/2024    Physician Orders: PT Eval and Treat   Medical Diagnosis from Referral:   M54.50 (ICD-10-CM) - Low back pain   M15.0 (ICD-10-CM) - Primary generalized (osteo)arthritis      Evaluation Date: 5/17/2024  Authorization Period Expiration: 12/31/2024  Plan of Care Expiration: 8/17/2024  Progress Note Due: 7/21/2024   Date of Surgery: n/a   Visit # / Visits authorized: 14  FOTO: 2 / 3     Precautions: Standard      Time In: 1:50  PM   Time Out:  2:45  PM  Total Billable Time:   45 min       PTA Visit #: 0/5      Subjective     Patient reports: Just not responding to therapy like she thought she would; still having back and leg pain, not sure what is going on.   She was compliant with home exercise program.  Response to previous treatment: good  Functional change: not much, Gardenia is very sedentary at home, she talks about wanting to get equipment so she can do some of these things at home, but doesn't; she mentions that "I am so fearful of falling, I just have to do everything so slow".     Pain:  8/10  Location: bilateral lower back, anterior thighs      Objective      Objective Measures updated at progress report unless specified.     Treatment     Gardenia received the treatments listed below:      therapeutic exercises to develop strength, endurance, ROM, and flexibility for 25 minutes including:  Recumbent Bike x 5 mins Level 4 (Sci-Fit)   Nu-Step - Level 5 x 15 mins   DKC with use of SB x 2 mins   Lumbar rotation - feet together x 2 mins   Posterior pelvic tilt 5"h x 15  Bridges x 15   Ball Squeezes x 2 min  SLR 10 x 2  S/L hip abduction x 10  S/L clams x 15        manual therapy techniques:  were applied to " "the: Left shoulder, lumbar spine Left over Right side for 10 minutes, including:  S/L on right - mobilization of left scapula into all planes, passive ROM left shoulder, thoracic rotation; Lumbar- segmental flexion mobilization for facet movement and reduction of compression; Lamina release in S/L;    neuromuscular re-education activities to improve:  for  minutes. The following activities were included:    therapeutic activities to improve functional performance for 15 minutes, including:  Achilles stretch - wedge 30 sec x 3  Heel raises x 15   Toe-Taps: 6" step x 2 mins - minimize use of hands for balance   Step-ups: 6" step 15 times each leg - single hand assist   Open book x 10  Tandem stance x 30 sec switching feet   SLS - 30 sec with light hand assist x 2   Seated Lumbar flexion/side bending with use of SB x 3 mins     gait training to improve functional mobility and safety for   minutes, including:    Patient Education and Home Exercises       Education provided:   - discussed increasing her activity at home again; talked about living alone and how difficult this can be after spending so much time caring for her  until he passed away. Gardenia has quit engaging in social activities, stated it is just too hard to get ready and go out, she doesn't sleep well, but stated all was so much better when her son comes to visit.  Talked to her about exploring an Assisted Living Facility - she said she thinks about this every now and then.     Written Home Exercises Provided:  Gardenia given written HEP today with exercises performed above on the mat;   Recommended performing a few of these everyday; Gardenia demonstrated good  understanding of the education provided.     Assessment     Gardenia continues to report back and bilateral thigh pain; Just can't make herself move more at home, talks about how careful she has to be because she is afraid of falling; Discussed with patient that moving more and quicker actually " helps to decrease the fall rate; She said that she will try to do more at home. Does well with exercises above, and would greatly benefit from performing some of these at home in addition to using her bicycle to improve her ability to move better and facilitate fascia release in tissues.  Wants to go get new tennis shoes to reduce the pain in her feet - stated that a friend is going to drive her.     Gardenia Is progressing well towards her goals.   Patient prognosis is Good.     Patient will continue to benefit from skilled outpatient physical therapy to address the deficits listed in the problem list box on initial evaluation, provide pt/family education and to maximize pt's level of independence in the home and community environment.     Patient's spiritual, cultural and educational needs considered and pt agreeable to plan of care and goals.     Anticipated barriers to physical therapy: none     Goals:   Short Term Goals: 3 weeks   Demonstrate improvement in recent symptoms to progress toward long term goals.  > Progressing   Correct sitting/standing postural deficits to reduce pain and promote postural awareness for injury prevention.  > Progressing   Demonstrate compliance with initial exercise program.  > Progressing      Long Term Goals: 6 weeks   Able to perform household activities with improved tolerance  > Slow progress   Able to transfers from all surfaces without limitation. > Slow progress   Able to ambulate community required distances with no limitation. > Slow progress   No reported falls during therapy session.   FOTO score improvement to 55  Independent with HEP for continued improvement in function.     Plan     Plan of care Certification: 5/17/2024 to 8/17/2024.     Outpatient Physical Therapy 2 times weekly for 6 weeks to include the following interventions: Gait Training, Manual Therapy, Neuromuscular Re-ed, Patient Education, Therapeutic Activities, and Therapeutic Exercise.        Ramona Ron,  PT

## 2024-07-12 NOTE — PROGRESS NOTES
"[facility]   Physical Therapy Treatment Note      Name: Gardenia Dent  Clinic Number: [id number]    Therapy Diagnosis:   Encounter Diagnosis   Name Primary?    Impaired functional mobility, balance, gait, and endurance Yes     Physician: Oxana Greene MD    Visit Date: 7/11/2024    Physician Orders: PT Eval and Treat   Medical Diagnosis from Referral:   M54.50 (ICD-10-CM) - Low back pain   M15.0 (ICD-10-CM) - Primary generalized (osteo)arthritis      Evaluation Date: 5/17/2024  Authorization Period Expiration: 12/31/2024  Plan of Care Expiration: 8/17/2024  Progress Note Due: 7/21/2024   Date of Surgery: n/a   Visit # / Visits authorized: 13   FOTO: 2 / 3     Precautions: Standard      Time In: 1:50  PM   Time Out: 2:45 PM  Total Billable Time:   43 min       PTA Visit #: 0/5      Subjective     Patient reports: Patient stated that she just hasn't felt good today; not sure what it wrong, tried eating a peanut butter/jelly sandwich thinking her blood sugar was low.   She was compliant with home exercise program.  Response to previous treatment: good  Functional change: not feeling well today; back and tops of her thighs are hurting.     Pain:  8/10  Location: bilateral lower back, anterior thighs      Objective      Objective Measures updated at progress report unless specified.     Treatment     Gardenia received the treatments listed below:      therapeutic exercises to develop strength, endurance, ROM, and flexibility for 25 minutes including:  Recumbent Bike x 5 mins Level 4 (Sci-Fit)   Nu-Step - Level 5 x 15 mins   DKC with use of SB x 2 mins   Lumbar rotation - feet together x 2 mins   Posterior pelvic tilt 5"h x 15  Bridges x 15   Ball Squeezes x 2 min  SLR 10 x 2  S/L hip abduction x 10  S/L clams x 15        manual therapy techniques:  were applied to the: Left shoulder, lumbar spine Left over Right side for 10 minutes, including:  S/L on right - mobilization of left scapula " "into all planes, passive ROM left shoulder, thoracic rotation; Lumbar- segmental flexion mobilization for facet movement and reduction of compression; Lamina release in S/L;    neuromuscular re-education activities to improve:  for  minutes. The following activities were included:    therapeutic activities to improve functional performance for 15 minutes, including:  Achilles stretch - wedge 30 sec x 3  Heel raises x 15   Toe-Taps: 6" step x 2 mins - minimize use of hands for balance   Step-ups: 6" step 15 times each leg - single hand assist   Open book x 10  Tandem stance x 30 sec switching feet   SLS - 30 sec with light hand assist x 2   Seated Lumbar flexion/side bending with use of SB x 3 mins     gait training to improve functional mobility and safety for   minutes, including:    Patient Education and Home Exercises       Education provided:   - discussed increasing her activity at home, talked about aquatic ex - she used to do this; too hot to walk outside,     Written Home Exercises Provided:  Gardenia given written HEP today with exercises performed above on the mat;   Recommended performing a few of these everyday; Gardenia demonstrated good  understanding of the education provided.     Assessment     Gardenia continues to report back and bilateral thigh pain; Just can't make herself move more at home, talks about how careful she has to be because she is afraid of falling; Discussed with patient that moving more and quicker actually helps to decrease the fall rate; She said that she will try to do more at home. Does well with exercises above, and would greatly benefit from performing some of these at home in addition to using her bicycle to improve her ability to move better and facilitate fascia release in tissues.      Gardenia Is progressing well towards her goals.   Patient prognosis is Good.     Patient will continue to benefit from skilled outpatient physical therapy to address the deficits listed in the " problem list box on initial evaluation, provide pt/family education and to maximize pt's level of independence in the home and community environment.     Patient's spiritual, cultural and educational needs considered and pt agreeable to plan of care and goals.     Anticipated barriers to physical therapy: none     Goals:   Short Term Goals: 3 weeks   Demonstrate improvement in recent symptoms to progress toward long term goals.  > Progressing   Correct sitting/standing postural deficits to reduce pain and promote postural awareness for injury prevention.  > Progressing   Demonstrate compliance with initial exercise program.  > Progressing      Long Term Goals: 6 weeks   Able to perform household activities with improved tolerance  > Slow progress   Able to transfers from all surfaces without limitation. > Slow progress   Able to ambulate community required distances with no limitation. > Slow progress   No reported falls during therapy session.   FOTO score improvement to 55  Independent with HEP for continued improvement in function.     Plan     Plan of care Certification: 5/17/2024 to 8/17/2024.     Outpatient Physical Therapy 2 times weekly for 6 weeks to include the following interventions: Gait Training, Manual Therapy, Neuromuscular Re-ed, Patient Education, Therapeutic Activities, and Therapeutic Exercise.        Ramona Ron, PT

## 2024-07-16 ENCOUNTER — CLINICAL SUPPORT (OUTPATIENT)
Dept: REHABILITATION | Facility: HOSPITAL | Age: 84
End: 2024-07-16
Payer: MEDICARE

## 2024-07-16 DIAGNOSIS — Z74.09 IMPAIRED FUNCTIONAL MOBILITY, BALANCE, GAIT, AND ENDURANCE: Primary | ICD-10-CM

## 2024-07-16 PROCEDURE — 97530 THERAPEUTIC ACTIVITIES: CPT | Mod: KX,PN

## 2024-07-16 PROCEDURE — 97110 THERAPEUTIC EXERCISES: CPT | Mod: KX,PN

## 2024-07-16 PROCEDURE — 97140 MANUAL THERAPY 1/> REGIONS: CPT | Mod: KX,PN

## 2024-07-16 NOTE — PROGRESS NOTES
"[facility]   Physical Therapy Treatment Note      Name: Gardenia Dent  Clinic Number: [id number]    Therapy Diagnosis:   Encounter Diagnosis   Name Primary?    Impaired functional mobility, balance, gait, and endurance Yes     Physician: Oxana Greene MD    Visit Date: 7/16/2024    Physician Orders: PT Eval and Treat   Medical Diagnosis from Referral:   M54.50 (ICD-10-CM) - Low back pain   M15.0 (ICD-10-CM) - Primary generalized (osteo)arthritis      Evaluation Date: 5/17/2024  Authorization Period Expiration: 12/31/2024  Plan of Care Expiration: 8/17/2024  Progress Note Due: 7/21/2024   Date of Surgery: n/a   Visit # / Visits authorized: 15  FOTO: 2 / 3     Precautions: Standard      Time In: 12:30  PM   Time Out:  1:30  PM  Total Billable Time:   45 min       PTA Visit #: 0/5      Subjective     Patient reports: I' feeling pretty good today;   She was compliant with home exercise program.  Response to previous treatment: good  Functional change: not much, Gardenia is very sedentary at home, she talks about wanting to get equipment so she can do some of these things at home, but doesn't; she mentions that "I am so fearful of falling, I just have to do everything so slow".     Pain:  6/10  Location: bilateral lower back, anterior thighs      Objective      Objective Measures updated at progress report unless specified.     Treatment     Gardenia received the treatments listed below:      therapeutic exercises to develop strength, endurance, ROM, and flexibility for 20 minutes including:  Recumbent Bike x 5 mins Level 4 (Sci-Fit)   Nu-Step - Level 5 x 15 mins   DKC with use of SB x 2 mins   Lumbar rotation - feet together x 2 mins   Posterior pelvic tilt 5"h x 15  Bridges x 15   Ball Squeezes x 2 min  SLR 10 x 2  S/L hip abduction x 10  S/L clams x 15        manual therapy techniques:  were applied to the: Left shoulder, lumbar spine Left over Right side for 10 minutes, including:  S/L on " "right - mobilization of left scapula into all planes, passive ROM left shoulder, thoracic rotation; Lumbar- segmental flexion mobilization for facet movement and reduction of compression;     neuromuscular re-education activities to improve:  for  minutes. The following activities were included:    therapeutic activities to improve functional performance for 15 minutes, including:  Achilles stretch - wedge 30 sec x 3  Heel raises x 15   Toe-Taps: 6" step x 2 mins - minimize use of hands for balance - standing on Air-Ex   Step-ups: 6" step 15 times each leg - single hand assist   Open book x 10  Tandem stance x 30 sec switching feet   SLS - 30 sec with light hand assist x 2   Seated Lumbar flexion/side bending with use of SB x 3 mins     gait training to improve functional mobility and safety for   minutes, including:    Patient Education and Home Exercises       Education provided:   - discussed increasing her activity at home again; talked about living alone and how difficult this can be after spending so much time caring for her  until he passed away. Gardenia has quit engaging in social activities, stated it is just too hard to get ready and go out, she doesn't sleep well, but stated all was so much better when her son comes to visit.  Talked to her about exploring an Assisted Living Facility - she said she thinks about this every now and then.     Written Home Exercises Provided:  Gardenia given written HEP today with exercises performed above on the mat;   Recommended performing a few of these everyday; Gardenia demonstrated good  understanding of the education provided.     Assessment     Gardenia continues to report back and bilateral thigh pain; Just can't make herself move more at home, talks about how careful she has to be because she is afraid of falling; Discussed with patient that moving more and quicker actually helps to decrease the fall rate; She said that she will try to do more at home. Does well " with exercises above, and would greatly benefit from performing some of these at home in addition to using her bicycle to improve her ability to move better and facilitate fascia release in tissues.  Wants to go get new tennis shoes to reduce the pain in her feet - stated that a friend is going to drive her.     Gardenia Is progressing well towards her goals.   Patient prognosis is Good.     Patient will continue to benefit from skilled outpatient physical therapy to address the deficits listed in the problem list box on initial evaluation, provide pt/family education and to maximize pt's level of independence in the home and community environment.     Patient's spiritual, cultural and educational needs considered and pt agreeable to plan of care and goals.     Anticipated barriers to physical therapy: none     Goals:   Short Term Goals: 3 weeks   Demonstrate improvement in recent symptoms to progress toward long term goals.  > Progressing   Correct sitting/standing postural deficits to reduce pain and promote postural awareness for injury prevention.  > Progressing   Demonstrate compliance with initial exercise program.  > Progressing      Long Term Goals: 6 weeks   Able to perform household activities with improved tolerance  > Slow progress   Able to transfers from all surfaces without limitation. > Slow progress   Able to ambulate community required distances with no limitation. > Slow progress   No reported falls during therapy session.   FOTO score improvement to 55  Independent with HEP for continued improvement in function.     Plan     Plan of care Certification: 5/17/2024 to 8/17/2024.     Outpatient Physical Therapy 2 times weekly for 6 weeks to include the following interventions: Gait Training, Manual Therapy, Neuromuscular Re-ed, Patient Education, Therapeutic Activities, and Therapeutic Exercise.        Ramona Ron, PT

## 2024-07-19 ENCOUNTER — CLINICAL SUPPORT (OUTPATIENT)
Dept: REHABILITATION | Facility: HOSPITAL | Age: 84
End: 2024-07-19
Payer: MEDICARE

## 2024-07-19 DIAGNOSIS — Z74.09 IMPAIRED FUNCTIONAL MOBILITY, BALANCE, GAIT, AND ENDURANCE: Primary | ICD-10-CM

## 2024-07-19 PROCEDURE — 97140 MANUAL THERAPY 1/> REGIONS: CPT | Mod: KX,PN

## 2024-07-19 PROCEDURE — 97110 THERAPEUTIC EXERCISES: CPT | Mod: KX,PN

## 2024-07-19 PROCEDURE — 97530 THERAPEUTIC ACTIVITIES: CPT | Mod: KX,PN

## 2024-07-19 NOTE — PROGRESS NOTES
"[facility]   Physical Therapy Treatment Note      Name: Gardenia Dent  Clinic Number: [id number]    Therapy Diagnosis:   Encounter Diagnosis   Name Primary?    Impaired functional mobility, balance, gait, and endurance Yes     Physician: Oxana Greene MD    Visit Date: 7/19/2024    Physician Orders: PT Eval and Treat   Medical Diagnosis from Referral:   M54.50 (ICD-10-CM) - Low back pain   M15.0 (ICD-10-CM) - Primary generalized (osteo)arthritis      Evaluation Date: 5/17/2024  Authorization Period Expiration: 12/31/2024  Plan of Care Expiration: 8/17/2024  Progress Note Due: 7/21/2024   Date of Surgery: n/a   Visit # / Visits authorized: 16   FOTO: 2 / 3     Precautions: Standard      Time In: 12:25  PM   Time Out:  1:15   PM  Total Billable Time:   45 min       PTA Visit #: 0/5      Subjective     Patient reports:  I woke up this morning and both of my legs just hurt.  I was worried that I would not be able to get going.   She was compliant with home exercise program.  Response to previous treatment: good  Functional change: not much, Gardenia is very sedentary at home, she talks about wanting to get equipment so she can do some of these things at home, but doesn't; she mentions that "I am so fearful of falling, I just have to do everything so slow".     Pain:  6/10  Location: bilateral lower back, anterior thighs      Objective      Objective Measures updated at progress report unless specified.     Treatment     Gardenia received the treatments listed below:      therapeutic exercises to develop strength, endurance, ROM, and flexibility for 20 minutes including:  Recumbent Bike x 5 mins Level 4 (Sci-Fit)   Nu-Step - Level 5 x 15 mins   DKC with use of SB x 2 mins   Lumbar rotation - feet together x 2 mins   Posterior pelvic tilt 5"h x 15  Bridges x 15   Ball Squeezes x 2 min  SLR 10 x 2  S/L hip abduction x 10  S/L clams x 15        manual therapy techniques:  were applied to the: " "Left shoulder, lumbar spine Left over Right side for 10 minutes, including:  S/L on right - mobilization of left scapula into all planes, passive ROM left shoulder, thoracic rotation; Lumbar- segmental flexion mobilization for facet movement and reduction of compression;     neuromuscular re-education activities to improve:  for  minutes. The following activities were included:    therapeutic activities to improve functional performance for 15 minutes, including:  Achilles stretch - wedge 30 sec x 3  Heel raises x 15   Toe-Taps: 6" step x 2 mins - minimize use of hands for balance - standing on Air-Ex   Step-ups: 6" step 15 times each leg - single hand assist   Open book x 10  Tandem stance x 30 sec switching feet   SLS - 30 sec with light hand assist x 2   Seated Lumbar flexion/side bending with use of SB x 3 mins     gait training to improve functional mobility and safety for   minutes, including:    Patient Education and Home Exercises       Education provided:   - discussed increasing her activity at home again; talked about living alone and how difficult this can be after spending so much time caring for her  until he passed away. Gardenia has quit engaging in social activities, stated it is just too hard to get ready and go out, she doesn't sleep well, but stated all was so much better when her son comes to visit.  Talked to her about exploring an Assisted Living Facility - she said she thinks about this every now and then.     Written Home Exercises Provided:  Gardenia given written HEP today with exercises performed above on the mat;   Recommended performing a few of these everyday; Gardenia demonstrated good  understanding of the education provided.     Assessment     Gardenia continues to report back and bilateral thigh pain; Just can't make herself move more at home, talks about how careful she has to be because she is afraid of falling; Discussed with patient that moving more and quicker actually helps to " decrease the fall rate; She said that she will try to do more at home. Does well with exercises above, and would greatly benefit from performing some of these at home in addition to using her bicycle to improve her ability to move better and facilitate fascia release in tissues.  Wants to go get new tennis shoes to reduce the pain in her feet - stated that a friend is going to drive her.     Gardenia Is progressing well towards her goals.   Patient prognosis is Good.     Patient will continue to benefit from skilled outpatient physical therapy to address the deficits listed in the problem list box on initial evaluation, provide pt/family education and to maximize pt's level of independence in the home and community environment.     Patient's spiritual, cultural and educational needs considered and pt agreeable to plan of care and goals.     Anticipated barriers to physical therapy: none     Goals:   Short Term Goals: 3 weeks   Demonstrate improvement in recent symptoms to progress toward long term goals.  > Progressing   Correct sitting/standing postural deficits to reduce pain and promote postural awareness for injury prevention.  > Progressing   Demonstrate compliance with initial exercise program.  > Progressing      Long Term Goals: 6 weeks   Able to perform household activities with improved tolerance  > Slow progress   Able to transfers from all surfaces without limitation. > Slow progress   Able to ambulate community required distances with no limitation. > Slow progress   No reported falls during therapy session.   FOTO score improvement to 55  Independent with HEP for continued improvement in function.     Plan     Plan of care Certification: 5/17/2024 to 8/17/2024.     Outpatient Physical Therapy 2 times weekly for 6 weeks to include the following interventions: Gait Training, Manual Therapy, Neuromuscular Re-ed, Patient Education, Therapeutic Activities, and Therapeutic Exercise.        Ramona Ron, PT

## 2024-07-23 ENCOUNTER — CLINICAL SUPPORT (OUTPATIENT)
Dept: REHABILITATION | Facility: HOSPITAL | Age: 84
End: 2024-07-23
Payer: MEDICARE

## 2024-07-23 DIAGNOSIS — Z74.09 IMPAIRED FUNCTIONAL MOBILITY, BALANCE, GAIT, AND ENDURANCE: Primary | ICD-10-CM

## 2024-07-23 PROCEDURE — 97530 THERAPEUTIC ACTIVITIES: CPT | Mod: KX,PN

## 2024-07-23 PROCEDURE — 97140 MANUAL THERAPY 1/> REGIONS: CPT | Mod: KX,PN

## 2024-07-23 NOTE — PROGRESS NOTES
"[facility]   Physical Therapy Treatment Note      Name: Gardenia Dent  Clinic Number: [id number]    Therapy Diagnosis:   Encounter Diagnosis   Name Primary?    Impaired functional mobility, balance, gait, and endurance Yes     Physician: Oxana Greene MD    Visit Date: 7/23/2024    Physician Orders: PT Eval and Treat   Medical Diagnosis from Referral:   M54.50 (ICD-10-CM) - Low back pain   M15.0 (ICD-10-CM) - Primary generalized (osteo)arthritis      Evaluation Date: 5/17/2024  Authorization Period Expiration: 12/31/2024  Plan of Care Expiration: 8/17/2024  Progress Note Due: 7/21/2024   Date of Surgery: n/a   Visit # / Visits authorized: 17  FOTO: 2 / 3     Precautions: Standard      Time In: 2:00 PM   Time Out:  3:00   PM  Total Billable Time:   30 min split billing        PTA Visit #: 0/5      Subjective     Patient reports:  Why do you think I hurt all over my body?  Discussed any new medicines with patient - answer NO; Does take Statins for cholesterol - could be this.    She was compliant with home exercise program.  Response to previous treatment: good  Functional change: not much, Gardenia is very sedentary at home, she talks about wanting to get equipment so she can do some of these things at home, but doesn't; she mentions that "I am so fearful of falling, I just have to do everything so slow".     Pain:  6/10  Location: bilateral lower back, anterior thighs      Objective      Objective Measures updated at progress report unless specified.     Treatment     Gardenia received the treatments listed below:      therapeutic exercises to develop strength, endurance, ROM, and flexibility for 20 minutes including:  Recumbent Bike x 5 mins Level 4 (Sci-Fit)   Nu-Step - Level 5 x 15 mins   DKC with use of SB x 2 mins   Lumbar rotation - feet together x 2 mins   Posterior pelvic tilt 5"h x 15  Bridges x 15   Ball Squeezes x 2 min  SLR 10 x 2  S/L hip abduction x 10  S/L clams x 15    " "    manual therapy techniques:  were applied to the: Left shoulder, lumbar spine Left over Right side for 10 minutes, including:  S/L on right - mobilization of left scapula into all planes, passive ROM left shoulder, thoracic rotation; Lumbar- segmental flexion mobilization for facet movement and reduction of compression;     neuromuscular re-education activities to improve:  for  minutes. The following activities were included:    therapeutic activities to improve functional performance for 15 minutes, including:  Achilles stretch - wedge 30 sec x 3  Heel raises x 15   Toe-Taps: 6" step x 2 mins - minimize use of hands for balance - standing on Air-Ex   Step-ups: 6" step 15 times each leg - single hand assist   Open book x 10  Tandem stance x 30 sec switching feet   SLS - 30 sec with light hand assist x 2   Seated Lumbar flexion/side bending with use of SB x 3 mins     gait training to improve functional mobility and safety for   minutes, including:    Patient Education and Home Exercises       Education provided:   - discussed increasing her activity at home again; talked about living alone and how difficult this can be after spending so much time caring for her  until he passed away. Gardenia has quit engaging in social activities, stated it is just too hard to get ready and go out, she doesn't sleep well, but stated all was so much better when her son comes to visit.  Talked to her about exploring an Assisted Living Facility - she said she thinks about this every now and then.     Written Home Exercises Provided:  Gardenia given written HEP today with exercises performed above on the mat;   Recommended performing a few of these everyday; Gardenia demonstrated good  understanding of the education provided.     Assessment     Gardenia continues to report back and bilateral thigh pain; Just can't make herself move more at home, talks about how careful she has to be because she is afraid of falling; Discussed with " patient that moving more and quicker actually helps to decrease the fall rate; She said that she will try to do more at home. Does well with exercises above, and would greatly benefit from performing some of these at home in addition to using her bicycle to improve her ability to move better and facilitate fascia release in tissues.  Wants to go get new tennis shoes to reduce the pain in her feet - stated that a friend is going to drive her.     Gardenia Is progressing well towards her goals.   Patient prognosis is Good.     Patient will continue to benefit from skilled outpatient physical therapy to address the deficits listed in the problem list box on initial evaluation, provide pt/family education and to maximize pt's level of independence in the home and community environment.     Patient's spiritual, cultural and educational needs considered and pt agreeable to plan of care and goals.     Anticipated barriers to physical therapy: none     Goals:   Short Term Goals: 3 weeks   Demonstrate improvement in recent symptoms to progress toward long term goals.  > Progressing   Correct sitting/standing postural deficits to reduce pain and promote postural awareness for injury prevention.  > Progressing   Demonstrate compliance with initial exercise program.  > Progressing      Long Term Goals: 6 weeks   Able to perform household activities with improved tolerance  > Slow progress   Able to transfers from all surfaces without limitation. > Slow progress   Able to ambulate community required distances with no limitation. > Slow progress   No reported falls during therapy session.   FOTO score improvement to 55  Independent with HEP for continued improvement in function.     Plan     Plan of care Certification: 5/17/2024 to 8/17/2024.     Outpatient Physical Therapy 2 times weekly for 6 weeks to include the following interventions: Gait Training, Manual Therapy, Neuromuscular Re-ed, Patient Education, Therapeutic  Activities, and Therapeutic Exercise.        Ramona Ron, PT

## 2024-07-26 ENCOUNTER — CLINICAL SUPPORT (OUTPATIENT)
Dept: REHABILITATION | Facility: HOSPITAL | Age: 84
End: 2024-07-26
Payer: MEDICARE

## 2024-07-26 DIAGNOSIS — Z74.09 IMPAIRED FUNCTIONAL MOBILITY, BALANCE, GAIT, AND ENDURANCE: Primary | ICD-10-CM

## 2024-07-26 PROCEDURE — 97140 MANUAL THERAPY 1/> REGIONS: CPT | Mod: PN

## 2024-07-26 PROCEDURE — 97530 THERAPEUTIC ACTIVITIES: CPT | Mod: PN

## 2024-07-26 NOTE — PROGRESS NOTES
"[facility]   Physical Therapy Treatment Note      Name: Gardenia Dent  Clinic Number: [id number]    Therapy Diagnosis:   Encounter Diagnosis   Name Primary?    Impaired functional mobility, balance, gait, and endurance Yes     Physician: Oxana Greene MD    Visit Date: 7/26/2024    Physician Orders: PT Eval and Treat   Medical Diagnosis from Referral:   M54.50 (ICD-10-CM) - Low back pain   M15.0 (ICD-10-CM) - Primary generalized (osteo)arthritis      Evaluation Date: 5/17/2024  Authorization Period Expiration: 12/31/2024  Plan of Care Expiration: 8/17/2024  Progress Note Due: 8/17/2024   Date of Surgery: n/a   Visit # / Visits authorized: 18  FOTO: 2 / 3     Precautions: Standard      Time In: 2:00 PM   Time Out:  3:00   PM  Total Billable Time:  30 mins - split billing with another medicare patient        PTA Visit #: 0/5      Subjective     Patient reports:  "I'm doing alright today"  Gardenia still noting general back/LE pain with and without activity.   She was compliant with home exercise program, but not really doing much at home to increase activity.   Response to previous treatment: good  Functional change: not much, Gardenia is very sedentary at home, she talks about wanting to get equipment so she can do some of these things at home, but doesn't; she mentions that "I am so fearful of falling, I just have to do everything so slow".     Pain:  5-6/10  Location: bilateral lower back, anterior thighs      Objective      Objective Measures updated at progress report unless specified.     Treatment     Gardenia received the treatments listed below:      therapeutic exercises to develop strength, endurance, ROM, and flexibility for 20 minutes including:  Recumbent Bike x 5 mins Level 4 (Sci-Fit)   Nu-Step - Level 5 x 15 mins   DKC with use of SB x 2 mins   Lumbar rotation - feet together x 2 mins   Posterior pelvic tilt 5"h x 15  Bridges x 15   Ball Squeezes x 2 min  SLR 10 x 2  S/L hip " "abduction x 10  S/L clams x 15        manual therapy techniques:  were applied to the: Left shoulder, lumbar spine Left over Right side for 10 minutes, including:  S/L on right - mobilization of left scapula into all planes, passive ROM left shoulder, thoracic rotation; Lumbar- segmental flexion mobilization for facet movement and reduction of compression;     neuromuscular re-education activities to improve:  for  minutes. The following activities were included:    therapeutic activities to improve functional performance for 15 minutes, including:  Achilles stretch - wedge 30 sec x 3  Heel raises x 15   Toe-Taps: 6" step x 2 mins - minimize use of hands for balance - standing on Air-Ex   Step-ups: 6" step 15 times each leg - single hand assist   Open book x 10  Tandem stance x 30 sec switching feet   SLS - 30 sec with light hand assist x 2   Seated Lumbar flexion/side bending with use of SB x 3 mins     gait training to improve functional mobility and safety for   minutes, including:    Patient Education and Home Exercises       Education provided:   - discussed increasing her activity at home again; talked about living alone and how difficult this can be after spending so much time caring for her  until he passed away. Gadrenia has quit engaging in social activities, stated it is just too hard to get ready and go out, she doesn't sleep well, but stated all was so much better when her son comes to visit.  Talked to her about exploring an Assisted Living Facility - she said she thinks about this every now and then.     Written Home Exercises Provided:  Gardenia given written HEP today with exercises performed above on the mat;   Recommended performing a few of these everyday; Gardenia demonstrated good  understanding of the education provided.     Assessment     Gardenia continues to report back and bilateral thigh pain; Just can't make herself move more at home, talks about how careful she has to be because she is " afraid of falling; Discussed with patient that moving more and quicker actually helps to decrease the fall rate; She said that she will try to do more at home. Does well with exercises above, and would greatly benefit from performing some of these at home in addition to using her bicycle to improve her ability to move better and facilitate fascia release in tissues.  Wants to go get new tennis shoes to reduce the pain in her feet - stated that a friend is going to drive her.     Gardenia Is progressing well towards her goals.   Patient prognosis is Good.     Patient will continue to benefit from skilled outpatient physical therapy to address the deficits listed in the problem list box on initial evaluation, provide pt/family education and to maximize pt's level of independence in the home and community environment.     Patient's spiritual, cultural and educational needs considered and pt agreeable to plan of care and goals.     Anticipated barriers to physical therapy: none     Goals:   Short Term Goals: 3 weeks   Demonstrate improvement in recent symptoms to progress toward long term goals.  > Progressing   Correct sitting/standing postural deficits to reduce pain and promote postural awareness for injury prevention.  > Progressing   Demonstrate compliance with initial exercise program.  > Progressing      Long Term Goals: 6 weeks   Able to perform household activities with improved tolerance  > Slow progress   Able to transfers from all surfaces without limitation. > Slow progress   Able to ambulate community required distances with no limitation. > Slow progress   No reported falls during therapy session.   FOTO score improvement to 55  Independent with HEP for continued improvement in function.     Plan     Plan of care Certification: 5/17/2024 to 8/17/2024.     Outpatient Physical Therapy 2 times weekly for 6 weeks to include the following interventions: Gait Training, Manual Therapy, Neuromuscular Re-ed, Patient  Education, Therapeutic Activities, and Therapeutic Exercise.        Ramona Ron, PT

## 2024-07-30 ENCOUNTER — CLINICAL SUPPORT (OUTPATIENT)
Dept: REHABILITATION | Facility: HOSPITAL | Age: 84
End: 2024-07-30
Payer: MEDICARE

## 2024-07-30 DIAGNOSIS — Z74.09 IMPAIRED FUNCTIONAL MOBILITY, BALANCE, GAIT, AND ENDURANCE: Primary | ICD-10-CM

## 2024-07-30 PROCEDURE — 97530 THERAPEUTIC ACTIVITIES: CPT | Mod: KX,PN

## 2024-07-30 PROCEDURE — 97140 MANUAL THERAPY 1/> REGIONS: CPT | Mod: KX,PN

## 2024-07-30 PROCEDURE — 97110 THERAPEUTIC EXERCISES: CPT | Mod: KX,PN

## 2024-07-30 NOTE — PROGRESS NOTES
"[facility]   Physical Therapy Treatment Note      Name: Gardenia Dent  Clinic Number: [id number]    Therapy Diagnosis:   Encounter Diagnosis   Name Primary?    Impaired functional mobility, balance, gait, and endurance Yes     Physician: Oxana Greene MD    Visit Date: 7/30/2024    Physician Orders: PT Eval and Treat   Medical Diagnosis from Referral:   M54.50 (ICD-10-CM) - Low back pain   M15.0 (ICD-10-CM) - Primary generalized (osteo)arthritis      Evaluation Date: 5/17/2024  Authorization Period Expiration: 12/31/2024  Plan of Care Expiration: 8/17/2024  Progress Note Due: 8/17/2024   Date of Surgery: n/a   Visit # / Visits authorized: 19  FOTO: 2 / 3     Precautions: Standard      Time In: 2:00 PM   Time Out:  3:00   PM  Total Billable Time:           PTA Visit #: 0/5      Subjective     Patient reports:  "I'm just hurting so bad today"  I rode to [place] with a friend yesterday, we had lunch and I didn't think I was going to make it back, I just hurt so bad."   She was compliant with home exercise program, but not really doing much at home to increase activity.   Response to previous treatment: good  Functional change: not much, Gardenia is very sedentary at home, she talks about wanting to get equipment so she can do some of these things at home, but doesn't; she mentions that "I am so fearful of falling, I just have to do everything so slow".     Pain:  7-8/10  Location: bilateral lower back, anterior thighs      Objective      Objective Measures updated at progress report unless specified.     Treatment     Gardenia received the treatments listed below:      therapeutic exercises to develop strength, endurance, ROM, and flexibility for 25 minutes including:  Recumbent Bike x 5 mins Level 4 (Sci-Fit)   Nu-Step - Level 5 x 15 mins   DKC with use of SB x 2 mins   Lumbar rotation - feet together x 2 mins   Posterior pelvic tilt 5"h x 15  Bridges x 15   Ball Squeezes x 2 min  SLR 10 x " "2  S/L hip abduction x 10  S/L clams x 15        manual therapy techniques:  were applied to the lumbar spine for 15 minutes, including:  Prone - lamina release lumbar spine ; paraspinal elongation with skin rolling; IASTM to address multifidus, paraspinal, quadratus myofascial tightness;      neuromuscular re-education activities to improve:  for  minutes. The following activities were included:    therapeutic activities to improve functional performance for 15 minutes, including:  Achilles stretch - wedge 30 sec x 3  Heel raises x 15   Toe-Taps: 6" step x 2 mins - minimize use of hands for balance - standing on Air-Ex   Step-ups: 6" step 15 times each leg - single hand assist   Open book x 10  Tandem stance x 30 sec switching feet   SLS - 30 sec with light hand assist x 2   Seated Lumbar flexion/side bending with use of SB x 3 mins     gait training to improve functional mobility and safety for   minutes, including:    Patient Education and Home Exercises       Education provided:   - discussed increasing her activity at home again; talked about living alone and how difficult this can be after spending so much time caring for her  until he passed away. Gardenia has quit engaging in social activities, stated it is just too hard to get ready and go out, she doesn't sleep well, but stated all was so much better when her son comes to visit.  Talked to her about exploring an Assisted Living Facility - she said she thinks about this every now and then.     Written Home Exercises Provided:  Gardenia given written HEP today with exercises performed above on the mat;   Recommended performing a few of these everyday; Gardenia demonstrated good  understanding of the education provided.     Assessment     Gardenia continues to report back and bilateral thigh/ leg pain; discussed returning to Dr. Greene when she returns from vacation to discuss referral to pain management.  She is taking ALEVE daily, but this is not helpful for " her.  Unable to stand/walk community distances secondary to back and BLE pain, limited compliance with HEP.   Gardenia Is progressing well towards her goals.   Patient prognosis is Good.     Patient will continue to benefit from skilled outpatient physical therapy to address the deficits listed in the problem list box on initial evaluation, provide pt/family education and to maximize pt's level of independence in the home and community environment.     Patient's spiritual, cultural and educational needs considered and pt agreeable to plan of care and goals.     Anticipated barriers to physical therapy: none     Goals:   Short Term Goals: 3 weeks   Demonstrate improvement in recent symptoms to progress toward long term goals.  > Progressing   Correct sitting/standing postural deficits to reduce pain and promote postural awareness for injury prevention.  > Progressing   Demonstrate compliance with initial exercise program.  > Progressing      Long Term Goals: 6 weeks   Able to perform household activities with improved tolerance  > Slow progress   Able to transfers from all surfaces without limitation. > Slow progress   Able to ambulate community required distances with no limitation. > Slow progress   No reported falls during therapy session.   FOTO score improvement to 55  Independent with HEP for continued improvement in function.     Plan     Plan of care Certification: 5/17/2024 to 8/17/2024.     Outpatient Physical Therapy 2 times weekly for 6 weeks to include the following interventions: Gait Training, Manual Therapy, Neuromuscular Re-ed, Patient Education, Therapeutic Activities, and Therapeutic Exercise.        Ramona Ron, PT

## 2024-09-05 ENCOUNTER — TELEPHONE (OUTPATIENT)
Dept: OPHTHALMOLOGY | Facility: CLINIC | Age: 84
End: 2024-09-05
Payer: MEDICARE

## 2024-09-05 NOTE — TELEPHONE ENCOUNTER
scheduled    ----- Message from Bud Mclaughlin sent at 9/5/2024  2:31 PM CDT -----  Regarding: appt  Type:  Sooner Apoointment Request      Name of Caller:pt    When is the first available appointment?dept booked     Symptoms:eye exam      Best Call Back Number:[phone]      Additional Information: pt is looking to get schedule for next month oct 4th - 17th if possible.  please call to discuss.

## 2024-09-11 NOTE — PROGRESS NOTES
"                                                    Physical Therapy Daily Note     [id number]  Diagnosis:   Encounter Diagnosis   Name Primary?    Acute right-sided low back pain with right-sided sciatica Yes     Physician:  Dr. Garcia   Precautions: standard  Visit #: 5 of 12   PTA Visit #: 1    Time In:  3:00 PM   Time Out:   4:20 PM     Subjective      Pt reports:   "I'm having a pretty good day;"     Pain Scale: Gardenia rates pain on a scale of 0-10 to be 3 currently. - Right upper thigh pain not consistent; does not go below knee any longer.     Objective     Gardenia received individual therapeutic exercises to develop ROM, posture, core stabilization and centralization of pain from RLE to lower back  for 43  minutes including:  SKC x 10  DKC x 6  Lumbar rolls x 3 mins with towel between knees  Bridges x 10  Pelvic tilts x 10  Leg lengthening with arm lengthening x 10  S/L hip abduction x 10  Clams / Reverse clams x 10   Prone - knee flexion x 10  Prone leg extension x 10 - too much pain with alternate bob and leg - back to just leg   Prone Lumbar Ext x 10  MIA x 3mins         Gardenia received the following manual therapy techniques: Joint mobilizations and Soft tissue Mobilization were applied to the: lumbar spine  for 15 minutes including:   RLE/LLE - hamstring stretching; In side lying - right piriformis STM, RLE into hip extension - lumbar extension. Prone - lumbar side bending on right side. Paraspinal skin rolling.      The patient received the following direct contact modalities after being cleared for contraindications:     The patient received the following supervised modalities after being cleared for contradictions:  Moist heat to lumbar spine x 15 mins in 90/90 position followed by ice x 15 mins in same position     Written Home Exercises Provided:   Gave Gardenia a written HEP with all of the exercises noted above   Pt demo fair understanding of the education " Appointment has been scheduled.   provided. Gardenia demonstrated fair return demonstration of activities.     Education provided re:  Gardenia verbalized fair understanding of education provided.   No spiritual or educational barriers to learning provided    Assessment     Gardenia tolerated treatment well;ble to resolve Right leg pain with extension exercises;  Re-educated Gardenia on her lower back issues and why they seem to get better then reoccur;  She is still convinced that using heat/cold alternating these is what is going to solve her problems; Told her that moving was the key - moving her lumbar spine and hips will help to decrease pressure on her nerve. She is not walking with any type of lateral shift. . Needs to be vigilant with prone exercises and needs to move around as much as she is able to tolerate during the day - movement is important. Required assistance for all of her exercises today - is not doing them at home.   This is a 79 y.o. female referred to outpatient physical therapy and presents with a medical diagnosis of lumbar radiculopathy  and demonstrates limitations as described in the problem list. Pt prognosis is Good. Pt will continue to benefit from skilled outpatient physical therapy to address the deficits listed in the problem list, provide pt/family education and to maximize pt's level of independence in the home and community environment.     Goals as follows:Long Term Goals: 6 weeks - all goals remain appropriate.      Pain: Decrease pain to no more than 2 /10 to allow for return to full participation in daily and recreational activities   Strength: Improve strength in core muscles to 4/5 for improved lumbopelvic stability  ROM: Improve ROM to 75% of normal limits   Functional scale: Improve score on Oswestry to <30% limitation   Lifting: Lift 10 lbs to waist level, 5 lbs to shoulder level, 5 lbs to overhead without pain or compensation  Walking: Increase walking distance and/or duration to 30 mins without pain   Postures:  Increase sitting and/or standing duration to 30 mins without pain   Transfers: Perform sit <> stand and supine <> sit  transfers without increased pain or limitation  Exercise: demonstrate independence with home exercise program to maintain gains made in therapy.            Plan     Continue with established Plan of Care towards PT goals.    Therapist: Ramona Ron, PT  12/12/2019

## 2024-10-21 ENCOUNTER — TELEPHONE (OUTPATIENT)
Dept: PODIATRY | Facility: CLINIC | Age: 84
End: 2024-10-21
Payer: MEDICARE

## 2024-10-21 NOTE — TELEPHONE ENCOUNTER
----- Message from Lala sent at 10/21/2024  1:11 PM CDT -----  Contact: self  Type:  Needs Medical Advice    Who Called: self  Symptoms (please be specific): pt wants to see  in [facility] but usually sees Dr. Dominguez in the [place]. Pt wants to change over because she said she has to walk a long distance to get to Dr. Dominguez;s office, she was wondering can she go to a diff location ([facility]) or if they can send a wheel chair so she doesn't have to walk that far, please call pt for more info.     Would the patient rather a call back or a response via [facility]? call  Best Call Back Number: [phone] (home)     Additional Information: please advise and thank you.

## 2024-10-21 NOTE — TELEPHONE ENCOUNTER
Patient wanted to know if there were wheel chairs available at [facility] location. Advised patient yes and scheduled an appointment for patient and confirmed.

## 2024-11-05 ENCOUNTER — OFFICE VISIT (OUTPATIENT)
Dept: PODIATRY | Facility: CLINIC | Age: 84
End: 2024-11-05
Payer: MEDICARE

## 2024-11-05 VITALS
HEART RATE: 81 BPM | DIASTOLIC BLOOD PRESSURE: 79 MMHG | HEIGHT: 65 IN | BODY MASS INDEX: 28.82 KG/M2 | WEIGHT: 173 LBS | SYSTOLIC BLOOD PRESSURE: 135 MMHG

## 2024-11-05 DIAGNOSIS — E11.9 TYPE II DIABETES MELLITUS, WELL CONTROLLED: Primary | ICD-10-CM

## 2024-11-05 DIAGNOSIS — E11.9 COMPREHENSIVE DIABETIC FOOT EXAMINATION, TYPE 2 DM, ENCOUNTER FOR: ICD-10-CM

## 2024-11-05 DIAGNOSIS — L60.0 INGROWN NAIL: ICD-10-CM

## 2024-11-05 PROCEDURE — 99999 PR PBB SHADOW E&M-EST. PATIENT-LVL IV: CPT | Mod: PBBFAC,,, | Performed by: PODIATRIST

## 2024-11-05 PROCEDURE — 99213 OFFICE O/P EST LOW 20 MIN: CPT | Mod: S$PBB,,, | Performed by: PODIATRIST

## 2024-11-05 PROCEDURE — 99214 OFFICE O/P EST MOD 30 MIN: CPT | Mod: PBBFAC,PN | Performed by: PODIATRIST

## 2024-11-05 NOTE — PROGRESS NOTES
Subjective:       Patient ID: Gardenia Dent is a 84 y.o. female.    Chief Complaint: Diabetic Foot Exam and Nail Care  Patient presents for follow-up due to type 2 diabetes, hammertoe 2nd digit right foot.  Has been wearing wider shoes and open toed to avoid pressure on the hammertoe which has been doing well over the last few months.  Relates concerns regarding neuropathy and  admits she has been mostly sedentary, afraid of falling.   Relates numbness in her fingers.   PCP prescribed gabapentin low-dose, did not really take much of it  Relates diabetes remains well-controlled, son is going to help her set up her glucometer this weekend, she has not been checking it daily    Past Medical History:   Diagnosis Date    Anxiety     Asthma     Depression     Dyslipidemia     GERD (gastroesophageal reflux disease)     Hypothyroidism     Lumbar disc disease with radiculopathy     Type 2 diabetes mellitus 03/23/2021     Past Surgical History:   Procedure Laterality Date    HAND SURGERY      HIP SURGERY      HYSTERECTOMY      SINUS SURGERY      TOTAL KNEE ARTHROPLASTY      WRIST SURGERY       Family History   Problem Relation Name Age of Onset    Cancer Mother      Diabetes type II Mother      Heart disease Father      Heart disease Brother      Diabetes Brother      Diabetes type II Brother       Social History     Socioeconomic History    Marital status:    Tobacco Use    Smoking status: Never    Smokeless tobacco: Never   Substance and Sexual Activity    Alcohol use: No    Drug use: No    Sexual activity: Never       Current Outpatient Medications   Medication Sig Dispense Refill    ALPRAZolam (XANAX) 0.25 MG tablet 0.25 mg.      ascorbic Acid (VITAMIN C) 500 mg CpSR       blood glucose control, high Soln   one touch verio reflect test strips, See Instructions, use in glucose meter to check  bs daily, # 50 EA, 3 Refill(s), Pharmacy: [facility], 165.1, cm, 08/02/22 13:28:00 CDT,  Height/Length Measured, 77.8, kg, 08/02/22 13:28:00 CD...      blood sugar diagnostic Strp   one touch verio reflect test strips, See Instructions, use in glucose meter to check  bs daily, # 50 EA, 3 Refill(s), Pharmacy: [facility], 165, cm, 03/09/23 13:09:00 CST, Height/Length Measured, 74.1, kg, 03/09/23 13:09:00 CST, Weight Dosing      calcium-vitamin D3 (OS-ANITA 500 + D3) 500 mg-5 mcg (200 unit) per tablet Calcium 500 + D      coenzyme Q10 (CO Q-10) 100 mg capsule 100 mg.      colestipoL (COLESTID) 1 gram Tab 1 g.      dapagliflozin propanediol (FARXIGA) 5 mg Tab tablet 5 mg.      EScitalopram oxalate (LEXAPRO) 10 MG tablet 10 mg.      levothyroxine (SYNTHROID) 75 MCG tablet = 1 tab, Oral, Daily, # 90 tab, 3 Refill(s), Maintenance, Pharmacy: [facility], 165, cm, 10/24/23 13:01:00 CDT, Height/Length Measured, 74.8, kg, 10/24/23 13:01:00 CDT, Weight Dosing      loratadine-pseudoephedrine  mg (CLARITIN-D 24 HOUR)  mg per 24 hr tablet   1 tab, Oral, Daily, PRN as needed for congestion, # 30 tab, 1 Refill(s), Pharmacy: [facility] #[id number], 165.1, cm, 09/10/20 9:52:00 CDT, Height/Length Measured, 76.8, kg, 09/10/20 9:52:00 CDT, Weight Dosing      magnesium oxide 500 mg Tab 500 mg.      multivit with minerals/lutein (MULTIVITAMIN 50 PLUS ORAL) multivitamin      omeprazole (PRILOSEC) 40 MG capsule 40 mg.      pantoprazole (PROTONIX) 40 MG tablet = 1 tab, Oral, Daily, # 90 tab, 3 Refill(s), Maintenance, Pharmacy: [id number], 165, cm, 10/24/23 13:01:00 CDT, Height/Length Measured, 74.8, kg, 10/24/23 13:01:00 CDT, Weight Dosing      rosuvastatin (CRESTOR) 5 MG tablet   See Instructions, TAKE 1 TABLET AT BEDTIME, # 90 tab, 3 Refill(s), Pharmacy: [facility] #[id number], 165.1, cm, 06/05/20 11:18:00 CDT, Height/Length Measured, Weight Dosing      turmeric, bulk, 100 % Powd turmeric (bulk)      zinc acetate 50 mg (zinc) Cap 50 mg.       No current facility-administered  "medications for this visit.     Review of patient's allergies indicates:   Allergen Reactions    Bacitracin Itching    Gentamicin     Solifenacin     Sulfa (sulfonamide antibiotics)        Review of Systems   All other systems reviewed and are negative.      Objective:      Vitals:    11/05/24 1351   BP: 135/79   BP Location: Left arm   Patient Position: Sitting   Pulse: 81   Weight: 78.5 kg (173 lb)   Height: 5' 5" (1.651 m)     Physical Exam  Vitals and nursing note reviewed.   Constitutional:       Appearance: Normal appearance.   Cardiovascular:      Pulses:           Dorsalis pedis pulses are 1+ on the right side and 1+ on the left side.        Posterior tibial pulses are 1+ on the right side and 1+ on the left side.   Pulmonary:      Effort: Pulmonary effort is normal.   Musculoskeletal:         General: No tenderness.      Right foot: Deformity and bunion present.      Left foot: Deformity (Hammertoe 2nd digit right) and bunion present.      Comments: Limited mobility   Feet:      Right foot:      Skin integrity: Skin integrity normal.      Toenail Condition: Right toenails are long.      Left foot:      Skin integrity: Skin integrity normal.      Toenail Condition: Left toenails are abnormally thick and long.   Skin:     Capillary Refill: Capillary refill takes 2 to 3 seconds.   Neurological:      General: No focal deficit present.      Mental Status: She is alert.   Psychiatric:         Thought Content: Thought content normal.                                Assessment:       1. Type II diabetes mellitus, well controlled    2. Comprehensive diabetic foot examination, type 2 DM, encounter for    3. Ingrown nail            Plan:           Reviewed neuropathy with patient, potential complications, fall risk and the need for her to consider using assistive device when walking  Advise diabetes may be contributing, other contributing factors DDD  arthritis on back and lumbar radiculopathy  We reviewed appropriate " shoes indoors to help with stability and balance  Reviewed reviewed how important it is to walk daily, even if it is in her home where she is more comfortable, walking is good for neuropathy, good for stretching, muscles,  and balance  Reviewed  additional stretching  Reviewed potential complications due to hammertoe  2nd digit right, absolutely needs to avoid shoes which could rub or cause callus or blister on this area and wereviewed application of Voltaren gel as needed for joint pain in feet  Reviewed care and maintenance of skin and nails,  nails debrided, thickness reduced  Reviewed diabetic education and the need to check feet daily, contact office with any area of concern which has not improved in a few days  Patient was in understanding and agreement with treatment plan.  I counseled the patient on their conditions, implications and medical management.  Instructed patient to contact the office with any changes, questions, concerns, worsening of symptoms.   Total face to face time 20 minutes, exam, assessment, treatment, discussion, additional time for review of chart prior to and following appointment and visit documentation, consultation and coordination of care.   Follow up prn 2 months.   Patient relates she should have been seen 2 months ago however she has been sick and had other health related issues and now her toes are bothering her significantly.  Patient did have several ingrowing toenails on multiple digits that required debridement she is also at risk for ulceration and breakdown secondary to hammertoe deformities especially noted on the 2nd digit right.    This note was created using M*[employer] voice recognition software that occasionally misinterpreted phrases or words.

## 2024-12-26 ENCOUNTER — TELEPHONE (OUTPATIENT)
Dept: OPTOMETRY | Facility: CLINIC | Age: 84
End: 2024-12-26
Payer: MEDICARE

## 2024-12-26 NOTE — TELEPHONE ENCOUNTER
----- Message from Leatha sent at 12/26/2024  2:07 PM CST -----  Contact: PT  Type:  Needs Medical Advice    Who Called: PT   Would the patient rather a call back or a response via [facility]? CALL   Best Call Back Number: [phone]  Additional Information: PT WOULD LIKE THE OFFICE TO CALL HER TO CONFIRM THAT HER INSUR IS ACCEPTED   THANK YOU

## 2024-12-26 NOTE — TELEPHONE ENCOUNTER
Returned call, no answer and no voicemail.   Unable to leave message.     Will call again before end of day.

## 2025-01-07 ENCOUNTER — OFFICE VISIT (OUTPATIENT)
Dept: PODIATRY | Facility: CLINIC | Age: 85
End: 2025-01-07
Payer: MEDICARE

## 2025-01-07 VITALS
WEIGHT: 173.94 LBS | HEIGHT: 65 IN | BODY MASS INDEX: 28.98 KG/M2 | HEART RATE: 78 BPM | SYSTOLIC BLOOD PRESSURE: 110 MMHG | DIASTOLIC BLOOD PRESSURE: 71 MMHG

## 2025-01-07 DIAGNOSIS — E11.9 COMPREHENSIVE DIABETIC FOOT EXAMINATION, TYPE 2 DM, ENCOUNTER FOR: ICD-10-CM

## 2025-01-07 DIAGNOSIS — E11.9 TYPE II DIABETES MELLITUS, WELL CONTROLLED: ICD-10-CM

## 2025-01-07 DIAGNOSIS — L60.0 INGROWN NAIL: Primary | ICD-10-CM

## 2025-01-07 PROCEDURE — 99214 OFFICE O/P EST MOD 30 MIN: CPT | Mod: PBBFAC,PN | Performed by: PODIATRIST

## 2025-01-07 PROCEDURE — 99999 PR PBB SHADOW E&M-EST. PATIENT-LVL IV: CPT | Mod: PBBFAC,,, | Performed by: PODIATRIST

## 2025-01-07 PROCEDURE — 99213 OFFICE O/P EST LOW 20 MIN: CPT | Mod: S$PBB,,, | Performed by: PODIATRIST

## 2025-01-08 NOTE — PROGRESS NOTES
Subjective:       Patient ID: Gardenia Dent is a 84 y.o. female.    Chief Complaint: Diabetic Foot Exam and Nail Care  Patient presents for follow-up due to type 2 diabetes, hammertoe 2nd digit right foot.  Has been wearing wider shoes and open toed to avoid pressure on the hammertoe which has been doing well over the last few months.  Relates concerns regarding neuropathy and  admits she has been mostly sedentary, afraid of falling.   Relates numbness in her fingers.   PCP prescribed gabapentin low-dose, did not really take much of it  Relates diabetes remains well-controlled, son is going to help her set up her glucometer this weekend, she has not been checking it daily    Past Medical History:   Diagnosis Date    Anxiety     Asthma     Depression     Dyslipidemia     GERD (gastroesophageal reflux disease)     Hypothyroidism     Lumbar disc disease with radiculopathy     Type 2 diabetes mellitus 03/23/2021     Past Surgical History:   Procedure Laterality Date    HAND SURGERY      HIP SURGERY      HYSTERECTOMY      SINUS SURGERY      TOTAL KNEE ARTHROPLASTY      WRIST SURGERY       Family History   Problem Relation Name Age of Onset    Cancer Mother      Diabetes type II Mother      Heart disease Father      Heart disease Brother      Diabetes Brother      Diabetes type II Brother       Social History     Socioeconomic History    Marital status:    Tobacco Use    Smoking status: Never    Smokeless tobacco: Never   Substance and Sexual Activity    Alcohol use: No    Drug use: No    Sexual activity: Never       Current Outpatient Medications   Medication Sig Dispense Refill    ALPRAZolam (XANAX) 0.25 MG tablet 0.25 mg.      ascorbic Acid (VITAMIN C) 500 mg CpSR       blood glucose control, high Soln   one touch verio reflect test strips, See Instructions, use in glucose meter to check  bs daily, # 50 EA, 3 Refill(s), Pharmacy: [facility], 165.1, cm, 08/02/22 13:28:00 CDT,  Height/Length Measured, 77.8, kg, 08/02/22 13:28:00 CD...      blood sugar diagnostic Strp   one touch verio reflect test strips, See Instructions, use in glucose meter to check  bs daily, # 50 EA, 3 Refill(s), Pharmacy: [facility], 165, cm, 03/09/23 13:09:00 CST, Height/Length Measured, 74.1, kg, 03/09/23 13:09:00 CST, Weight Dosing      calcium-vitamin D3 (OS-ANITA 500 + D3) 500 mg-5 mcg (200 unit) per tablet Calcium 500 + D      coenzyme Q10 (CO Q-10) 100 mg capsule 100 mg.      colestipoL (COLESTID) 1 gram Tab 1 g.      dapagliflozin propanediol (FARXIGA) 5 mg Tab tablet 5 mg.      EScitalopram oxalate (LEXAPRO) 10 MG tablet 10 mg.      levothyroxine (SYNTHROID) 75 MCG tablet = 1 tab, Oral, Daily, # 90 tab, 3 Refill(s), Maintenance, Pharmacy: [facility], 165, cm, 10/24/23 13:01:00 CDT, Height/Length Measured, 74.8, kg, 10/24/23 13:01:00 CDT, Weight Dosing      loratadine-pseudoephedrine  mg (CLARITIN-D 24 HOUR)  mg per 24 hr tablet   1 tab, Oral, Daily, PRN as needed for congestion, # 30 tab, 1 Refill(s), Pharmacy: [facility] #[id number], 165.1, cm, 09/10/20 9:52:00 CDT, Height/Length Measured, 76.8, kg, 09/10/20 9:52:00 CDT, Weight Dosing      magnesium oxide 500 mg Tab 500 mg.      multivit with minerals/lutein (MULTIVITAMIN 50 PLUS ORAL) multivitamin      omeprazole (PRILOSEC) 40 MG capsule 40 mg.      pantoprazole (PROTONIX) 40 MG tablet = 1 tab, Oral, Daily, # 90 tab, 3 Refill(s), Maintenance, Pharmacy: [id number], 165, cm, 10/24/23 13:01:00 CDT, Height/Length Measured, 74.8, kg, 10/24/23 13:01:00 CDT, Weight Dosing      rosuvastatin (CRESTOR) 5 MG tablet   See Instructions, TAKE 1 TABLET AT BEDTIME, # 90 tab, 3 Refill(s), Pharmacy: [facility] #[id number], 165.1, cm, 06/05/20 11:18:00 CDT, Height/Length Measured, Weight Dosing      turmeric, bulk, 100 % Powd turmeric (bulk)      zinc acetate 50 mg (zinc) Cap 50 mg.       No current facility-administered  "medications for this visit.     Review of patient's allergies indicates:   Allergen Reactions    Bacitracin Itching    Gentamicin     Solifenacin     Sulfa (sulfonamide antibiotics)        Review of Systems   All other systems reviewed and are negative.      Objective:      Vitals:    01/07/25 1329   BP: 110/71   BP Location: Right arm   Patient Position: Sitting   Pulse: 78   Weight: 78.9 kg (173 lb 15.1 oz)   Height: 5' 5" (1.651 m)     Physical Exam  Vitals and nursing note reviewed.   Constitutional:       Appearance: Normal appearance.   Cardiovascular:      Pulses:           Dorsalis pedis pulses are 1+ on the right side and 1+ on the left side.        Posterior tibial pulses are 1+ on the right side and 1+ on the left side.   Pulmonary:      Effort: Pulmonary effort is normal.   Musculoskeletal:         General: No tenderness.      Right foot: Deformity and bunion present.      Left foot: Deformity (Hammertoe 2nd digit right) and bunion present.      Comments: Limited mobility   Feet:      Right foot:      Skin integrity: Skin integrity normal.      Toenail Condition: Right toenails are long.      Left foot:      Skin integrity: Skin integrity normal.      Toenail Condition: Left toenails are abnormally thick and long.   Skin:     Capillary Refill: Capillary refill takes 2 to 3 seconds.   Neurological:      General: No focal deficit present.      Mental Status: She is alert.   Psychiatric:         Thought Content: Thought content normal.                                              Assessment:       1. Ingrown nail    2. Type II diabetes mellitus, well controlled    3. Comprehensive diabetic foot examination, type 2 DM, encounter for            Plan:           Reviewed neuropathy with patient, potential complications, fall risk and the need for her to consider using assistive device when walking  Advise diabetes may be contributing, other contributing factors DJD arthritis on back and lumbar radiculopathy  We " reviewed appropriate shoes indoors to help with stability and balance  Reviewed reviewed how important it is to walk daily, even if it is in her home where she is more comfortable, walking is good for neuropathy, good for stretching, muscles,  and balance  Reviewed  additional stretching  Reviewed potential complications due to hammertoe  2nd digit right, absolutely needs to avoid shoes which could rub or cause callus or blister on this area and wereviewed application of Voltaren gel as needed for joint pain in feet  Reviewed care and maintenance of skin and nails,  nails debrided, thickness reduced  Reviewed diabetic education and the need to check feet daily, contact office with any area of concern which has not improved in a few days  Patient was in understanding and agreement with treatment plan.  I counseled the patient on their conditions, implications and medical management.  Instructed patient to contact the office with any changes, questions, concerns, worsening of symptoms.   Total face to face time 20 minutes, exam, assessment, treatment, discussion, additional time for review of chart prior to and following appointment and visit documentation, consultation and coordination of care.   Follow up prn 2 months.   Diabetic Evaluation performed.  Patient did have several ingrowing toenails on multiple digits that required debridement she is also at risk for ulceration and breakdown secondary to hammertoe deformities especially noted on the 2nd digit right.    This note was created using M*[employer] voice recognition software that occasionally misinterpreted phrases or words.

## 2025-03-11 ENCOUNTER — OFFICE VISIT (OUTPATIENT)
Dept: PODIATRY | Facility: CLINIC | Age: 85
End: 2025-03-11
Payer: MEDICARE

## 2025-03-11 VITALS
WEIGHT: 173.94 LBS | HEART RATE: 80 BPM | SYSTOLIC BLOOD PRESSURE: 110 MMHG | DIASTOLIC BLOOD PRESSURE: 66 MMHG | BODY MASS INDEX: 28.98 KG/M2 | HEIGHT: 65 IN

## 2025-03-11 DIAGNOSIS — E11.9 COMPREHENSIVE DIABETIC FOOT EXAMINATION, TYPE 2 DM, ENCOUNTER FOR: ICD-10-CM

## 2025-03-11 DIAGNOSIS — E11.9 TYPE II DIABETES MELLITUS, WELL CONTROLLED: ICD-10-CM

## 2025-03-11 DIAGNOSIS — L60.0 INGROWN NAIL: Primary | ICD-10-CM

## 2025-03-11 PROCEDURE — 99213 OFFICE O/P EST LOW 20 MIN: CPT | Mod: S$PBB,,, | Performed by: PODIATRIST

## 2025-03-11 PROCEDURE — 99999 PR PBB SHADOW E&M-EST. PATIENT-LVL IV: CPT | Mod: PBBFAC,,, | Performed by: PODIATRIST

## 2025-03-11 PROCEDURE — 99214 OFFICE O/P EST MOD 30 MIN: CPT | Mod: PBBFAC,PN | Performed by: PODIATRIST

## 2025-03-13 NOTE — PROGRESS NOTES
Subjective:       Patient ID: Gardenia Dent is a 85 y.o. female.    Chief Complaint: Ingrown Toenail and Nail Problem  Patient presents for follow-up due to type 2 diabetes, hammertoe 2nd digit right foot.  Has been wearing wider shoes and open toed to avoid pressure on the hammertoe which has been doing well over the last few months.  Relates concerns regarding neuropathy and  admits she has been mostly sedentary, afraid of falling.   Relates numbness in her fingers.   PCP prescribed gabapentin low-dose, did not really take much of it  Relates diabetes remains well-controlled, son is going to help her set up her glucometer this weekend, she has not been checking it daily    Past Medical History:   Diagnosis Date    Anxiety     Asthma     Depression     Dyslipidemia     GERD (gastroesophageal reflux disease)     Hypothyroidism     Lumbar disc disease with radiculopathy     Type 2 diabetes mellitus 03/23/2021     Past Surgical History:   Procedure Laterality Date    HAND SURGERY      HIP SURGERY      HYSTERECTOMY      SINUS SURGERY      TOTAL KNEE ARTHROPLASTY      WRIST SURGERY       Family History   Problem Relation Name Age of Onset    Cancer Mother      Diabetes type II Mother      Heart disease Father      Heart disease Brother      Diabetes Brother      Diabetes type II Brother       Social History     Socioeconomic History    Marital status:    Tobacco Use    Smoking status: Never    Smokeless tobacco: Never   Substance and Sexual Activity    Alcohol use: No    Drug use: No    Sexual activity: Never       Current Outpatient Medications   Medication Sig Dispense Refill    ALPRAZolam (XANAX) 0.25 MG tablet 0.25 mg.      ascorbic Acid (VITAMIN C) 500 mg CpSR       blood glucose control, high Soln   one touch verio reflect test strips, See Instructions, use in glucose meter to check  bs daily, # 50 EA, 3 Refill(s), Pharmacy: [facility], 165.1, cm, 08/02/22 13:28:00 CDT,  Height/Length Measured, 77.8, kg, 08/02/22 13:28:00 CD...      blood sugar diagnostic Strp   one touch verio reflect test strips, See Instructions, use in glucose meter to check  bs daily, # 50 EA, 3 Refill(s), Pharmacy: [facility], 165, cm, 03/09/23 13:09:00 CST, Height/Length Measured, 74.1, kg, 03/09/23 13:09:00 CST, Weight Dosing      calcium-vitamin D3 (OS-ANITA 500 + D3) 500 mg-5 mcg (200 unit) per tablet Calcium 500 + D      coenzyme Q10 (CO Q-10) 100 mg capsule 100 mg.      dapagliflozin propanediol (FARXIGA) 5 mg Tab tablet 5 mg.      EScitalopram oxalate (LEXAPRO) 10 MG tablet 10 mg.      levothyroxine (SYNTHROID) 75 MCG tablet = 1 tab, Oral, Daily, # 90 tab, 3 Refill(s), Maintenance, Pharmacy: [facility], 165, cm, 10/24/23 13:01:00 CDT, Height/Length Measured, 74.8, kg, 10/24/23 13:01:00 CDT, Weight Dosing      loratadine-pseudoephedrine  mg (CLARITIN-D 24 HOUR)  mg per 24 hr tablet   1 tab, Oral, Daily, PRN as needed for congestion, # 30 tab, 1 Refill(s), Pharmacy: [facility] #[id number], 165.1, cm, 09/10/20 9:52:00 CDT, Height/Length Measured, 76.8, kg, 09/10/20 9:52:00 CDT, Weight Dosing      magnesium oxide 500 mg Tab 500 mg.      multivit with minerals/lutein (MULTIVITAMIN 50 PLUS ORAL) multivitamin      omeprazole (PRILOSEC) 40 MG capsule 40 mg.      pantoprazole (PROTONIX) 40 MG tablet = 1 tab, Oral, Daily, # 90 tab, 3 Refill(s), Maintenance, Pharmacy: [id number], 165, cm, 10/24/23 13:01:00 CDT, Height/Length Measured, 74.8, kg, 10/24/23 13:01:00 CDT, Weight Dosing      rosuvastatin (CRESTOR) 5 MG tablet   See Instructions, TAKE 1 TABLET AT BEDTIME, # 90 tab, 3 Refill(s), Pharmacy: [facility] #[id number], 165.1, cm, 06/05/20 11:18:00 CDT, Height/Length Measured, Weight Dosing      turmeric, bulk, 100 % Powd turmeric (bulk)      zinc acetate 50 mg (zinc) Cap 50 mg.      colestipoL (COLESTID) 1 gram Tab 1 g.       No current facility-administered  "medications for this visit.     Review of patient's allergies indicates:   Allergen Reactions    Bacitracin Itching    Gentamicin     Solifenacin     Sulfa (sulfonamide antibiotics)        Review of Systems   All other systems reviewed and are negative.      Objective:      Vitals:    03/11/25 1329   BP: 110/66   Pulse: 80   Weight: 78.9 kg (173 lb 15.1 oz)   Height: 5' 5" (1.651 m)     Physical Exam  Vitals and nursing note reviewed.   Constitutional:       Appearance: Normal appearance.   Cardiovascular:      Pulses:           Dorsalis pedis pulses are 1+ on the right side and 1+ on the left side.        Posterior tibial pulses are 1+ on the right side and 1+ on the left side.   Pulmonary:      Effort: Pulmonary effort is normal.   Musculoskeletal:         General: No tenderness.      Right foot: Deformity and bunion present.      Left foot: Deformity (Hammertoe 2nd digit right) and bunion present.      Comments: Limited mobility   Feet:      Right foot:      Skin integrity: Skin integrity normal.      Toenail Condition: Right toenails are long.      Left foot:      Skin integrity: Skin integrity normal.      Toenail Condition: Left toenails are abnormally thick and long.   Skin:     Capillary Refill: Capillary refill takes 2 to 3 seconds.   Neurological:      General: No focal deficit present.      Mental Status: She is alert.   Psychiatric:         Thought Content: Thought content normal.                                                            Assessment:       1. Ingrown nail    2. Type II diabetes mellitus, well controlled    3. Comprehensive diabetic foot examination, type 2 DM, encounter for            Plan:           Reviewed neuropathy with patient, potential complications, fall risk and the need for her to consider using assistive device when walking  Advise diabetes may be contributing, other contributing factors DJD arthritis on back and lumbar radiculopathy  We reviewed appropriate shoes indoors to " help with stability and balance  Reviewed reviewed how important it is to walk daily, even if it is in her home where she is more comfortable, walking is good for neuropathy, good for stretching, muscles,  and balance  Reviewed  additional stretching  Reviewed potential complications due to hammertoe  2nd digit right, absolutely needs to avoid shoes which could rub or cause callus or blister on this area and wereviewed application of Voltaren gel as needed for joint pain in feet  Reviewed care and maintenance of skin and nails,  nails debrided, thickness reduced  Reviewed diabetic education and the need to check feet daily, contact office with any area of concern which has not improved in a few days  Patient was in understanding and agreement with treatment plan.  I counseled the patient on their conditions, implications and medical management.  Instructed patient to contact the office with any changes, questions, concerns, worsening of symptoms.   Total face to face time 20 minutes, exam, assessment, treatment, discussion, additional time for review of chart prior to and following appointment and visit documentation, consultation and coordination of care.   Follow up prn 2 months.   Diabetic Evaluation performed.  Patient did have several ingrowing toenails on multiple digits that required debridement she is also at risk for ulceration and breakdown secondary to hammertoe deformities especially noted on the 2nd digit right.  Comprehensive diabetic evaluation performed.    This note was created using M*[employer] voice recognition software that occasionally misinterpreted phrases or words.

## 2025-05-13 ENCOUNTER — OFFICE VISIT (OUTPATIENT)
Dept: PODIATRY | Facility: CLINIC | Age: 85
End: 2025-05-13
Payer: MEDICARE

## 2025-05-13 VITALS
SYSTOLIC BLOOD PRESSURE: 110 MMHG | DIASTOLIC BLOOD PRESSURE: 70 MMHG | HEIGHT: 65 IN | HEART RATE: 86 BPM | BODY MASS INDEX: 28.98 KG/M2 | WEIGHT: 173.94 LBS

## 2025-05-13 DIAGNOSIS — E11.9 COMPREHENSIVE DIABETIC FOOT EXAMINATION, TYPE 2 DM, ENCOUNTER FOR: ICD-10-CM

## 2025-05-13 DIAGNOSIS — M20.41 HAMMER TOE OF SECOND TOE OF RIGHT FOOT: ICD-10-CM

## 2025-05-13 DIAGNOSIS — E11.9 TYPE II DIABETES MELLITUS, WELL CONTROLLED: ICD-10-CM

## 2025-05-13 DIAGNOSIS — L60.0 INGROWN NAIL: Primary | ICD-10-CM

## 2025-05-13 PROCEDURE — 99213 OFFICE O/P EST LOW 20 MIN: CPT | Mod: S$PBB,,, | Performed by: PODIATRIST

## 2025-05-13 PROCEDURE — 99999 PR PBB SHADOW E&M-EST. PATIENT-LVL IV: CPT | Mod: PBBFAC,,, | Performed by: PODIATRIST

## 2025-05-13 PROCEDURE — 99214 OFFICE O/P EST MOD 30 MIN: CPT | Mod: PBBFAC,PN | Performed by: PODIATRIST

## 2025-05-14 NOTE — PROGRESS NOTES
Subjective:       Patient ID: Gardenia Dent is a 85 y.o. female.    Chief Complaint: Ingrown Toenail  Patient presents for follow-up due to type 2 diabetes, hammertoe 2nd digit right foot.  Has been wearing wider shoes and open toed to avoid pressure on the hammertoe which has been doing well over the last few months.  Relates concerns regarding neuropathy and  admits she has been mostly sedentary, afraid of falling.      Past Medical History:   Diagnosis Date    Anxiety     Asthma     Depression     Dyslipidemia     GERD (gastroesophageal reflux disease)     Hypothyroidism     Lumbar disc disease with radiculopathy     Type 2 diabetes mellitus 03/23/2021     Past Surgical History:   Procedure Laterality Date    HAND SURGERY      HIP SURGERY      HYSTERECTOMY      SINUS SURGERY      TOTAL KNEE ARTHROPLASTY      WRIST SURGERY       Family History   Problem Relation Name Age of Onset    Cancer Mother      Diabetes type II Mother      Heart disease Father      Heart disease Brother      Diabetes Brother      Diabetes type II Brother       Social History     Socioeconomic History    Marital status:    Tobacco Use    Smoking status: Never    Smokeless tobacco: Never   Substance and Sexual Activity    Alcohol use: No    Drug use: No    Sexual activity: Never       Current Outpatient Medications   Medication Sig Dispense Refill    ALPRAZolam (XANAX) 0.25 MG tablet 0.25 mg.      ascorbic Acid (VITAMIN C) 500 mg CpSR       blood glucose control, high Soln   one touch verio reflect test strips, See Instructions, use in glucose meter to check  bs daily, # 50 EA, 3 Refill(s), Pharmacy:  Pharmacy, 165.1, cm, 08/02/22 13:28:00 CDT, Height/Length Measured, 77.8, kg, 08/02/22 13:28:00 CD...      blood sugar diagnostic Strp   one touch verio reflect test strips, See Instructions, use in glucose meter to check  bs daily, # 50 EA, 3 Refill(s), Pharmacy: [facility], 165, cm, 03/09/23 13:09:00 CST,  Height/Length Measured, 74.1, kg, 03/09/23 13:09:00 CST, Weight Dosing      calcium-vitamin D3 (OS-ANITA 500 + D3) 500 mg-5 mcg (200 unit) per tablet Calcium 500 + D      coenzyme Q10 (CO Q-10) 100 mg capsule 100 mg.      dapagliflozin propanediol (FARXIGA) 5 mg Tab tablet 5 mg.      EScitalopram oxalate (LEXAPRO) 10 MG tablet 10 mg.      levothyroxine (SYNTHROID) 75 MCG tablet = 1 tab, Oral, Daily, # 90 tab, 3 Refill(s), Maintenance, Pharmacy: [facility], 165, cm, 10/24/23 13:01:00 CDT, Height/Length Measured, 74.8, kg, 10/24/23 13:01:00 CDT, Weight Dosing      loratadine-pseudoephedrine  mg (CLARITIN-D 24 HOUR)  mg per 24 hr tablet   1 tab, Oral, Daily, PRN as needed for congestion, # 30 tab, 1 Refill(s), Pharmacy: [facility] #[id number], 165.1, cm, 09/10/20 9:52:00 CDT, Height/Length Measured, 76.8, kg, 09/10/20 9:52:00 CDT, Weight Dosing      magnesium oxide 500 mg Tab 500 mg.      multivit with minerals/lutein (MULTIVITAMIN 50 PLUS ORAL) multivitamin      omeprazole (PRILOSEC) 40 MG capsule 40 mg.      pantoprazole (PROTONIX) 40 MG tablet = 1 tab, Oral, Daily, # 90 tab, 3 Refill(s), Maintenance, Pharmacy: [id number], 165, cm, 10/24/23 13:01:00 CDT, Height/Length Measured, 74.8, kg, 10/24/23 13:01:00 CDT, Weight Dosing      rosuvastatin (CRESTOR) 5 MG tablet   See Instructions, TAKE 1 TABLET AT BEDTIME, # 90 tab, 3 Refill(s), Pharmacy: [facility] #[id number], 165.1, cm, 06/05/20 11:18:00 CDT, Height/Length Measured, Weight Dosing      turmeric, bulk, 100 % Powd turmeric (bulk)      zinc acetate 50 mg (zinc) Cap 50 mg.      colestipoL (COLESTID) 1 gram Tab 1 g.       No current facility-administered medications for this visit.     Review of patient's allergies indicates:   Allergen Reactions    Bacitracin Itching    Gentamicin     Solifenacin     Sulfa (sulfonamide antibiotics)        Review of Systems   Musculoskeletal:  Positive for arthralgias.   Skin:  Positive for  "color change.   All other systems reviewed and are negative.      Objective:      Vitals:    05/13/25 1347   BP: 110/70   Pulse: 86   Weight: 78.9 kg (173 lb 15.1 oz)   Height: 5' 5" (1.651 m)     Physical Exam  Vitals and nursing note reviewed.   Constitutional:       Appearance: Normal appearance.   Cardiovascular:      Pulses:           Dorsalis pedis pulses are 1+ on the right side and 1+ on the left side.        Posterior tibial pulses are 1+ on the right side and 1+ on the left side.   Pulmonary:      Effort: Pulmonary effort is normal.   Musculoskeletal:         General: No tenderness.      Right foot: Deformity and bunion present.      Left foot: Deformity (Hammertoe 2nd digit right) and bunion present.      Comments: Limited mobility   Feet:      Right foot:      Protective Sensation: 2 sites tested.  2 sites sensed.      Skin integrity: Skin integrity normal.      Toenail Condition: Right toenails are abnormally thick, long and ingrown.      Left foot:      Protective Sensation: 2 sites tested.  2 sites sensed.      Skin integrity: Skin integrity normal.      Toenail Condition: Left toenails are abnormally thick, long and ingrown.   Skin:     Capillary Refill: Capillary refill takes 2 to 3 seconds.   Neurological:      General: No focal deficit present.      Mental Status: She is alert.   Psychiatric:         Mood and Affect: Mood normal.         Behavior: Behavior normal.         Thought Content: Thought content normal.                                                                  Assessment:       1. Ingrown nail    2. Type II diabetes mellitus, well controlled    3. Comprehensive diabetic foot examination, type 2 DM, encounter for    4. Hammer toe of second toe of right foot            Plan:           Reviewed neuropathy with patient, potential complications, fall risk and the need for her to consider using assistive device when walking  Advise diabetes may be contributing, other contributing factors " DJD arthritis on back and lumbar radiculopathy  We reviewed appropriate shoes indoors to help with stability and balance  Reviewed reviewed how important it is to walk daily, even if it is in her home where she is more comfortable, walking is good for neuropathy, good for stretching, muscles,  and balance  Reviewed  additional stretching  Reviewed potential complications due to hammertoe  2nd digit right, absolutely needs to avoid shoes which could rub or cause callus or blister on this area and wereviewed application of Voltaren gel as needed for joint pain in feet  Reviewed care and maintenance of skin and nails,  nails debrided, thickness reduced  Reviewed diabetic education and the need to check feet daily, contact office with any area of concern which has not improved in a few days  Patient was in understanding and agreement with treatment plan.  I counseled the patient on their conditions, implications and medical management.  Instructed patient to contact the office with any changes, questions, concerns, worsening of symptoms.   Total face to face time 20 minutes, exam, assessment, treatment, discussion, additional time for review of chart prior to and following appointment and visit documentation, consultation and coordination of care.   Follow up prn 2 months.   Diabetic Evaluation performed.  Patient did have several ingrowing toenails on multiple digits that required debridement she is also at risk for ulceration and breakdown secondary to hammertoe deformities especially noted on the 2nd digit right.  Comprehensive diabetic evaluation performed.    This note was created using [employer] voice recognition software that occasionally misinterpreted phrases or words.

## 2025-07-24 ENCOUNTER — OFFICE VISIT (OUTPATIENT)
Dept: PODIATRY | Facility: CLINIC | Age: 85
End: 2025-07-24
Payer: MEDICARE

## 2025-07-24 VITALS
WEIGHT: 173.94 LBS | HEIGHT: 65 IN | SYSTOLIC BLOOD PRESSURE: 118 MMHG | HEART RATE: 90 BPM | BODY MASS INDEX: 28.98 KG/M2 | DIASTOLIC BLOOD PRESSURE: 72 MMHG

## 2025-07-24 DIAGNOSIS — M20.12 VALGUS DEFORMITY OF BOTH GREAT TOES: ICD-10-CM

## 2025-07-24 DIAGNOSIS — E11.9 TYPE II DIABETES MELLITUS, WELL CONTROLLED: ICD-10-CM

## 2025-07-24 DIAGNOSIS — L60.0 INGROWN NAIL: Primary | ICD-10-CM

## 2025-07-24 DIAGNOSIS — E11.9 COMPREHENSIVE DIABETIC FOOT EXAMINATION, TYPE 2 DM, ENCOUNTER FOR: ICD-10-CM

## 2025-07-24 DIAGNOSIS — M20.11 VALGUS DEFORMITY OF BOTH GREAT TOES: ICD-10-CM

## 2025-07-24 DIAGNOSIS — M19.079 OSTEOARTHRITIS OF ANKLE AND FOOT, UNSPECIFIED LATERALITY: ICD-10-CM

## 2025-07-24 PROCEDURE — 99213 OFFICE O/P EST LOW 20 MIN: CPT | Mod: S$PBB,,, | Performed by: PODIATRIST

## 2025-07-24 PROCEDURE — 99999 PR PBB SHADOW E&M-EST. PATIENT-LVL IV: CPT | Mod: PBBFAC,,, | Performed by: PODIATRIST

## 2025-07-24 PROCEDURE — 99214 OFFICE O/P EST MOD 30 MIN: CPT | Mod: PBBFAC,PN | Performed by: PODIATRIST

## 2025-07-26 NOTE — PROGRESS NOTES
Subjective:       Patient ID: Gardenia Dent is a 85 y.o. female.    Chief Complaint: Ingrown Toenail  Patient presents for follow-up due to type 2 diabetes, hammertoe 2nd digit right foot.  Has been wearing wider shoes and open toed to avoid pressure on the hammertoe which has been doing well over the last few months.  Relates concerns regarding neuropathy and  admits she has been mostly sedentary, afraid of falling.      Past Medical History:   Diagnosis Date    Anxiety     Asthma     Depression     Dyslipidemia     GERD (gastroesophageal reflux disease)     Hypothyroidism     Lumbar disc disease with radiculopathy     Type 2 diabetes mellitus 03/23/2021     Past Surgical History:   Procedure Laterality Date    HAND SURGERY      HIP SURGERY      HYSTERECTOMY      SINUS SURGERY      TOTAL KNEE ARTHROPLASTY      WRIST SURGERY       Family History   Problem Relation Name Age of Onset    Cancer Mother      Diabetes type II Mother      Heart disease Father      Heart disease Brother      Diabetes Brother      Diabetes type II Brother       Social History     Socioeconomic History    Marital status:    Tobacco Use    Smoking status: Never    Smokeless tobacco: Never   Substance and Sexual Activity    Alcohol use: No    Drug use: No    Sexual activity: Never       Current Outpatient Medications   Medication Sig Dispense Refill    ALPRAZolam (XANAX) 0.25 MG tablet 0.25 mg.      ascorbic Acid (VITAMIN C) 500 mg CpSR       blood glucose control, high Soln   one touch verio reflect test strips, See Instructions, use in glucose meter to check  bs daily, # 50 EA, 3 Refill(s), Pharmacy: [facility], 165.1, cm, 08/02/22 13:28:00 CDT, Height/Length Measured, 77.8, kg, 08/02/22 13:28:00 CD...      blood sugar diagnostic Strp   one touch verio reflect test strips, See Instructions, use in glucose meter to check  bs daily, # 50 EA, 3 Refill(s), Pharmacy: [facility], 165, cm, 03/09/23 13:09:00 CST,  Height/Length Measured, 74.1, kg, 03/09/23 13:09:00 CST, Weight Dosing      calcium-vitamin D3 (OS-ANITA 500 + D3) 500 mg-5 mcg (200 unit) per tablet Calcium 500 + D      coenzyme Q10 (CO Q-10) 100 mg capsule 100 mg.      dapagliflozin propanediol (FARXIGA) 5 mg Tab tablet 5 mg.      EScitalopram oxalate (LEXAPRO) 10 MG tablet 10 mg.      levothyroxine (SYNTHROID) 75 MCG tablet = 1 tab, Oral, Daily, # 90 tab, 3 Refill(s), Maintenance, Pharmacy: [facility], 165, cm, 10/24/23 13:01:00 CDT, Height/Length Measured, 74.8, kg, 10/24/23 13:01:00 CDT, Weight Dosing      loratadine-pseudoephedrine  mg (CLARITIN-D 24 HOUR)  mg per 24 hr tablet   1 tab, Oral, Daily, PRN as needed for congestion, # 30 tab, 1 Refill(s), Pharmacy: [facility] #[id number], 165.1, cm, 09/10/20 9:52:00 CDT, Height/Length Measured, 76.8, kg, 09/10/20 9:52:00 CDT, Weight Dosing      magnesium oxide 500 mg Tab 500 mg.      multivit with minerals/lutein (MULTIVITAMIN 50 PLUS ORAL) multivitamin      omeprazole (PRILOSEC) 40 MG capsule 40 mg.      pantoprazole (PROTONIX) 40 MG tablet = 1 tab, Oral, Daily, # 90 tab, 3 Refill(s), Maintenance, Pharmacy: [id number], 165, cm, 10/24/23 13:01:00 CDT, Height/Length Measured, 74.8, kg, 10/24/23 13:01:00 CDT, Weight Dosing      rosuvastatin (CRESTOR) 5 MG tablet   See Instructions, TAKE 1 TABLET AT BEDTIME, # 90 tab, 3 Refill(s), Pharmacy: [facility] #[id number], 165.1, cm, 06/05/20 11:18:00 CDT, Height/Length Measured, Weight Dosing      turmeric, bulk, 100 % Powd turmeric (bulk)      zinc acetate 50 mg (zinc) Cap 50 mg.      colestipoL (COLESTID) 1 gram Tab 1 g.       No current facility-administered medications for this visit.     Review of patient's allergies indicates:   Allergen Reactions    Bacitracin Itching    Gentamicin     Solifenacin     Sulfa (sulfonamide antibiotics)        Review of Systems   Musculoskeletal:  Positive for arthralgias.   Skin:  Positive for  "color change.   All other systems reviewed and are negative.      Objective:      Vitals:    07/24/25 1331   BP: 118/72   Pulse: 90   Weight: 78.9 kg (173 lb 15.1 oz)   Height: 5' 5" (1.651 m)     Physical Exam  Vitals and nursing note reviewed.   Constitutional:       Appearance: Normal appearance.   Cardiovascular:      Pulses:           Dorsalis pedis pulses are 1+ on the right side and 1+ on the left side.        Posterior tibial pulses are 1+ on the right side and 1+ on the left side.   Pulmonary:      Effort: Pulmonary effort is normal.   Musculoskeletal:         General: No tenderness.      Right foot: Deformity and bunion present.      Left foot: Deformity (Hammertoe 2nd digit right) and bunion present.      Comments: Limited mobility   Feet:      Right foot:      Protective Sensation: 2 sites tested.  2 sites sensed.      Skin integrity: Skin integrity normal.      Toenail Condition: Right toenails are abnormally thick, long and ingrown.      Left foot:      Protective Sensation: 2 sites tested.  2 sites sensed.      Skin integrity: Skin integrity normal.      Toenail Condition: Left toenails are abnormally thick, long and ingrown.   Skin:     Capillary Refill: Capillary refill takes 2 to 3 seconds.   Neurological:      General: No focal deficit present.      Mental Status: She is alert.   Psychiatric:         Mood and Affect: Mood normal.         Behavior: Behavior normal.         Thought Content: Thought content normal.                                                                            Assessment:       1. Ingrown nail    2. Type II diabetes mellitus, well controlled    3. Valgus deformity of both great toes    4. Osteoarthritis of ankle and foot, unspecified laterality    5. Comprehensive diabetic foot examination, type 2 DM, encounter for            Plan:           Reviewed neuropathy with patient, potential complications, fall risk and the need for her to consider using assistive device when " walking  Advise diabetes may be contributing, other contributing factors DJD arthritis on back and lumbar radiculopathy  We reviewed appropriate shoes indoors to help with stability and balance  Reviewed reviewed how important it is to walk daily, even if it is in her home where she is more comfortable, walking is good for neuropathy, good for stretching, muscles,  and balance  Reviewed  additional stretching  Reviewed potential complications due to hammertoe  2nd digit right, absolutely needs to avoid shoes which could rub or cause callus or blister on this area and wereviewed application of Voltaren gel as needed for joint pain in feet  Reviewed care and maintenance of skin and nails,  nails debrided, thickness reduced  Reviewed diabetic education and the need to check feet daily, contact office with any area of concern which has not improved in a few days  Patient was in understanding and agreement with treatment plan.  I counseled the patient on their conditions, implications and medical management.  Instructed patient to contact the office with any changes, questions, concerns, worsening of symptoms.   Total face to face time 20 minutes, exam, assessment, treatment, discussion, additional time for review of chart prior to and following appointment and visit documentation, consultation and coordination of care.   Follow up prn 2 months.   Diabetic Evaluation performed.  Patient did have several ingrowing toenails on multiple digits that required debridement she is also at risk for ulceration and breakdown secondary to hammertoe deformities especially noted on the 2nd digit right.  Comprehensive diabetic evaluation performed.    This note was created using [employer] voice recognition software that occasionally misinterpreted phrases or words.

## 2025-08-27 DIAGNOSIS — R29.898 OTHER SYMPTOMS AND SIGNS INVOLVING THE MUSCULOSKELETAL SYSTEM: ICD-10-CM

## 2025-08-27 DIAGNOSIS — M51.16 INTERVERTEBRAL DISC DISORDER WITH RADICULOPATHY OF LUMBAR REGION: Primary | ICD-10-CM
